# Patient Record
Sex: FEMALE | Race: WHITE | NOT HISPANIC OR LATINO
[De-identification: names, ages, dates, MRNs, and addresses within clinical notes are randomized per-mention and may not be internally consistent; named-entity substitution may affect disease eponyms.]

---

## 2018-03-01 ENCOUNTER — APPOINTMENT (OUTPATIENT)
Dept: PLASTIC SURGERY | Facility: CLINIC | Age: 48
End: 2018-03-01

## 2018-08-03 ENCOUNTER — APPOINTMENT (OUTPATIENT)
Dept: BREAST CENTER | Facility: CLINIC | Age: 48
End: 2018-08-03
Payer: COMMERCIAL

## 2018-08-03 VITALS
HEART RATE: 76 BPM | HEIGHT: 66 IN | SYSTOLIC BLOOD PRESSURE: 112 MMHG | BODY MASS INDEX: 31.34 KG/M2 | WEIGHT: 195 LBS | DIASTOLIC BLOOD PRESSURE: 76 MMHG

## 2018-08-03 DIAGNOSIS — N63.0 UNSPECIFIED LUMP IN UNSPECIFIED BREAST: ICD-10-CM

## 2018-08-03 DIAGNOSIS — Z82.3 FAMILY HISTORY OF STROKE: ICD-10-CM

## 2018-08-03 DIAGNOSIS — Z13.79 ENCOUNTER FOR OTHER SCREENING FOR GENETIC AND CHROMOSOMAL ANOMALIES: ICD-10-CM

## 2018-08-03 DIAGNOSIS — Z78.9 OTHER SPECIFIED HEALTH STATUS: ICD-10-CM

## 2018-08-03 DIAGNOSIS — Z80.3 FAMILY HISTORY OF MALIGNANT NEOPLASM OF BREAST: ICD-10-CM

## 2018-08-03 PROCEDURE — 99243 OFF/OP CNSLTJ NEW/EST LOW 30: CPT

## 2018-08-03 PROCEDURE — 36415 COLL VENOUS BLD VENIPUNCTURE: CPT

## 2018-09-05 ENCOUNTER — CLINICAL ADVICE (OUTPATIENT)
Age: 48
End: 2018-09-05

## 2019-05-16 ENCOUNTER — APPOINTMENT (OUTPATIENT)
Dept: OTOLARYNGOLOGY | Facility: CLINIC | Age: 49
End: 2019-05-16
Payer: SELF-PAY

## 2019-05-16 DIAGNOSIS — L98.8 OTHER SPECIFIED DISORDERS OF THE SKIN AND SUBCUTANEOUS TISSUE: ICD-10-CM

## 2019-05-16 PROCEDURE — D0165 COSMETIC CONSULT: CPT

## 2019-05-17 PROBLEM — L98.8 FACIAL RHYTIDS: Status: ACTIVE | Noted: 2019-05-17

## 2019-10-21 ENCOUNTER — APPOINTMENT (OUTPATIENT)
Dept: CARDIOLOGY | Facility: CLINIC | Age: 49
End: 2019-10-21
Payer: COMMERCIAL

## 2019-10-21 ENCOUNTER — NON-APPOINTMENT (OUTPATIENT)
Age: 49
End: 2019-10-21

## 2019-10-21 VITALS — HEIGHT: 66 IN | BODY MASS INDEX: 30.22 KG/M2 | WEIGHT: 188 LBS

## 2019-10-21 VITALS — SYSTOLIC BLOOD PRESSURE: 114 MMHG | OXYGEN SATURATION: 98 % | HEART RATE: 63 BPM | DIASTOLIC BLOOD PRESSURE: 82 MMHG

## 2019-10-21 PROCEDURE — 99205 OFFICE O/P NEW HI 60 MIN: CPT

## 2019-10-21 PROCEDURE — 93000 ELECTROCARDIOGRAM COMPLETE: CPT

## 2019-10-21 NOTE — HISTORY OF PRESENT ILLNESS
[FreeTextEntry1] : Patient is a 50 yo woman with no significant PMHx, family h/o CAD/MI presented to follow up  in setting of an abnormal ECG. the patient was seen by her PMD about a year ago and was noted to have T-wave inversions in the anterior precordium. Patient did not have any subsequent cardiac workup. Patient has not been physically active and is planning to start an exercise program. Patient denies any active cardiac symptoms-denies chest pain, shortness of breath, dyspnea on exertion, lightheadedness or syncope.

## 2019-10-21 NOTE — PHYSICAL EXAM
[General Appearance - Well Developed] : well developed [Normal Appearance] : normal appearance [Well Groomed] : well groomed [General Appearance - Well Nourished] : well nourished [No Deformities] : no deformities [General Appearance - In No Acute Distress] : no acute distress [Normal Conjunctiva] : the conjunctiva exhibited no abnormalities [Eyelids - No Xanthelasma] : the eyelids demonstrated no xanthelasmas [Normal Oral Mucosa] : normal oral mucosa [No Oral Pallor] : no oral pallor [No Oral Cyanosis] : no oral cyanosis [Normal Jugular Venous A Waves Present] : normal jugular venous A waves present [Normal Jugular Venous V Waves Present] : normal jugular venous V waves present [No Jugular Venous Harris A Waves] : no jugular venous harris A waves [Heart Rate And Rhythm] : heart rate and rhythm were normal [Heart Sounds] : normal S1 and S2 [Murmurs] : no murmurs present [Respiration, Rhythm And Depth] : normal respiratory rhythm and effort [Exaggerated Use Of Accessory Muscles For Inspiration] : no accessory muscle use [Auscultation Breath Sounds / Voice Sounds] : lungs were clear to auscultation bilaterally [Abdomen Soft] : soft [Abdomen Tenderness] : non-tender [Abdomen Mass (___ Cm)] : no abdominal mass palpated [Abnormal Walk] : normal gait [Gait - Sufficient For Exercise Testing] : the gait was sufficient for exercise testing [Nail Clubbing] : no clubbing of the fingernails [Cyanosis, Localized] : no localized cyanosis [Petechial Hemorrhages (___cm)] : no petechial hemorrhages [Skin Color & Pigmentation] : normal skin color and pigmentation [] : no rash [No Venous Stasis] : no venous stasis [Skin Lesions] : no skin lesions [No Skin Ulcers] : no skin ulcer [No Xanthoma] : no  xanthoma was observed [Oriented To Time, Place, And Person] : oriented to person, place, and time [Affect] : the affect was normal [Mood] : the mood was normal [No Anxiety] : not feeling anxious

## 2019-10-21 NOTE — DISCUSSION/SUMMARY
[FreeTextEntry1] : Patient is a 50 yo woman with no significant PMHx, family h/o CAD/MI presented to follow up  in setting of an abnormal ECG. the patient was seen by her PMD about a year ago and was noted to have T-wave inversions in the anterior precordium. patient is presently asymptomatic with no signs or symptoms and competent heart failure, unstable angina or arrhythmias\par - repeat EKG today confirms the T wave abnormality that was seen about a year ago\par - patient will be sent for an exercise stress test \par - an echocardiogram was done about a month \par - Exercise and weight loss and encouraged\par - BP is within normal limits. No medical therapy necessary at this time\par - Patient will be given a rreferral for routine blood work

## 2019-11-01 ENCOUNTER — APPOINTMENT (OUTPATIENT)
Dept: CV DIAGNOSTICS | Facility: HOSPITAL | Age: 49
End: 2019-11-01

## 2019-11-01 ENCOUNTER — OUTPATIENT (OUTPATIENT)
Dept: OUTPATIENT SERVICES | Facility: HOSPITAL | Age: 49
LOS: 1 days | End: 2019-11-01
Payer: COMMERCIAL

## 2019-11-01 DIAGNOSIS — R94.31 ABNORMAL ELECTROCARDIOGRAM [ECG] [EKG]: ICD-10-CM

## 2019-11-01 PROCEDURE — 93017 CV STRESS TEST TRACING ONLY: CPT

## 2019-11-01 PROCEDURE — 93018 CV STRESS TEST I&R ONLY: CPT

## 2019-11-01 PROCEDURE — 93016 CV STRESS TEST SUPVJ ONLY: CPT

## 2019-12-02 ENCOUNTER — APPOINTMENT (OUTPATIENT)
Dept: CV DIAGNOSITCS | Facility: HOSPITAL | Age: 49
End: 2019-12-02

## 2020-02-24 ENCOUNTER — APPOINTMENT (OUTPATIENT)
Dept: CARDIOLOGY | Facility: CLINIC | Age: 50
End: 2020-02-24

## 2020-10-16 ENCOUNTER — RESULT REVIEW (OUTPATIENT)
Age: 50
End: 2020-10-16

## 2021-03-09 ENCOUNTER — NON-APPOINTMENT (OUTPATIENT)
Age: 51
End: 2021-03-09

## 2021-03-10 ENCOUNTER — APPOINTMENT (OUTPATIENT)
Dept: CARDIOLOGY | Facility: CLINIC | Age: 51
End: 2021-03-10

## 2021-03-10 LAB — TROPONIN I SERPL-MCNC: <0.01 NG/ML

## 2021-03-12 ENCOUNTER — TRANSCRIPTION ENCOUNTER (OUTPATIENT)
Age: 51
End: 2021-03-12

## 2021-03-12 ENCOUNTER — EMERGENCY (EMERGENCY)
Facility: HOSPITAL | Age: 51
LOS: 1 days | Discharge: HOME | End: 2021-03-12
Attending: EMERGENCY MEDICINE
Payer: COMMERCIAL

## 2021-03-12 VITALS
SYSTOLIC BLOOD PRESSURE: 146 MMHG | TEMPERATURE: 100 F | OXYGEN SATURATION: 99 % | DIASTOLIC BLOOD PRESSURE: 81 MMHG | RESPIRATION RATE: 18 BRPM | HEART RATE: 88 BPM

## 2021-03-12 VITALS
SYSTOLIC BLOOD PRESSURE: 126 MMHG | RESPIRATION RATE: 17 BRPM | TEMPERATURE: 98 F | HEIGHT: 66 IN | WEIGHT: 195.11 LBS | OXYGEN SATURATION: 98 % | HEART RATE: 98 BPM | DIASTOLIC BLOOD PRESSURE: 82 MMHG

## 2021-03-12 LAB
ALBUMIN SERPL ELPH-MCNC: 4 G/DL — SIGNIFICANT CHANGE UP (ref 3.3–5)
ALP SERPL-CCNC: 41 U/L — SIGNIFICANT CHANGE UP (ref 40–120)
ALT FLD-CCNC: 24 U/L — SIGNIFICANT CHANGE UP (ref 10–45)
ANION GAP SERPL CALC-SCNC: 10 MMOL/L — SIGNIFICANT CHANGE UP (ref 5–17)
APTT BLD: 30 SEC — SIGNIFICANT CHANGE UP (ref 27.5–35.5)
AST SERPL-CCNC: 29 U/L — SIGNIFICANT CHANGE UP (ref 10–40)
BASOPHILS # BLD AUTO: 0.01 K/UL — SIGNIFICANT CHANGE UP (ref 0–0.2)
BASOPHILS NFR BLD AUTO: 0.3 % — SIGNIFICANT CHANGE UP (ref 0–2)
BILIRUB SERPL-MCNC: 0.3 MG/DL — SIGNIFICANT CHANGE UP (ref 0.2–1.2)
BUN SERPL-MCNC: 9 MG/DL — SIGNIFICANT CHANGE UP (ref 7–23)
CALCIUM SERPL-MCNC: 8.9 MG/DL — SIGNIFICANT CHANGE UP (ref 8.4–10.5)
CHLORIDE SERPL-SCNC: 100 MMOL/L — SIGNIFICANT CHANGE UP (ref 96–108)
CO2 SERPL-SCNC: 25 MMOL/L — SIGNIFICANT CHANGE UP (ref 22–31)
CREAT SERPL-MCNC: 0.77 MG/DL — SIGNIFICANT CHANGE UP (ref 0.5–1.3)
CRP SERPL-MCNC: 0.67 MG/DL — HIGH (ref 0–0.4)
D DIMER BLD IA.RAPID-MCNC: 327 NG/ML DDU — HIGH
EOSINOPHIL # BLD AUTO: 0 K/UL — SIGNIFICANT CHANGE UP (ref 0–0.5)
EOSINOPHIL NFR BLD AUTO: 0 % — SIGNIFICANT CHANGE UP (ref 0–6)
GAS PNL BLDV: SIGNIFICANT CHANGE UP
GLUCOSE SERPL-MCNC: 107 MG/DL — HIGH (ref 70–99)
HCG SERPL-ACNC: <2 MIU/ML — SIGNIFICANT CHANGE UP
HCT VFR BLD CALC: 38 % — SIGNIFICANT CHANGE UP (ref 34.5–45)
HGB BLD-MCNC: 12.9 G/DL — SIGNIFICANT CHANGE UP (ref 11.5–15.5)
IMM GRANULOCYTES NFR BLD AUTO: 0.3 % — SIGNIFICANT CHANGE UP (ref 0–1.5)
INR BLD: 1.16 RATIO — SIGNIFICANT CHANGE UP (ref 0.88–1.16)
LYMPHOCYTES # BLD AUTO: 0.57 K/UL — LOW (ref 1–3.3)
LYMPHOCYTES # BLD AUTO: 15.5 % — SIGNIFICANT CHANGE UP (ref 13–44)
MCHC RBC-ENTMCNC: 29.1 PG — SIGNIFICANT CHANGE UP (ref 27–34)
MCHC RBC-ENTMCNC: 33.9 GM/DL — SIGNIFICANT CHANGE UP (ref 32–36)
MCV RBC AUTO: 85.6 FL — SIGNIFICANT CHANGE UP (ref 80–100)
MONOCYTES # BLD AUTO: 0.31 K/UL — SIGNIFICANT CHANGE UP (ref 0–0.9)
MONOCYTES NFR BLD AUTO: 8.4 % — SIGNIFICANT CHANGE UP (ref 2–14)
NEUTROPHILS # BLD AUTO: 2.78 K/UL — SIGNIFICANT CHANGE UP (ref 1.8–7.4)
NEUTROPHILS NFR BLD AUTO: 75.5 % — SIGNIFICANT CHANGE UP (ref 43–77)
NRBC # BLD: 0 /100 WBCS — SIGNIFICANT CHANGE UP (ref 0–0)
PLATELET # BLD AUTO: 155 K/UL — SIGNIFICANT CHANGE UP (ref 150–400)
POTASSIUM SERPL-MCNC: 3.2 MMOL/L — LOW (ref 3.5–5.3)
POTASSIUM SERPL-SCNC: 3.2 MMOL/L — LOW (ref 3.5–5.3)
PROCALCITONIN SERPL-MCNC: 0.03 NG/ML — SIGNIFICANT CHANGE UP (ref 0.02–0.1)
PROT SERPL-MCNC: 6.9 G/DL — SIGNIFICANT CHANGE UP (ref 6–8.3)
PROTHROM AB SERPL-ACNC: 13.8 SEC — HIGH (ref 10.6–13.6)
RBC # BLD: 4.44 M/UL — SIGNIFICANT CHANGE UP (ref 3.8–5.2)
RBC # FLD: 12.3 % — SIGNIFICANT CHANGE UP (ref 10.3–14.5)
SARS-COV-2 RNA SPEC QL NAA+PROBE: DETECTED
SODIUM SERPL-SCNC: 135 MMOL/L — SIGNIFICANT CHANGE UP (ref 135–145)
WBC # BLD: 3.68 K/UL — LOW (ref 3.8–10.5)
WBC # FLD AUTO: 3.68 K/UL — LOW (ref 3.8–10.5)

## 2021-03-12 PROCEDURE — 85014 HEMATOCRIT: CPT

## 2021-03-12 PROCEDURE — 84145 PROCALCITONIN (PCT): CPT

## 2021-03-12 PROCEDURE — 71275 CT ANGIOGRAPHY CHEST: CPT | Mod: 26,ME

## 2021-03-12 PROCEDURE — 82435 ASSAY OF BLOOD CHLORIDE: CPT

## 2021-03-12 PROCEDURE — 85610 PROTHROMBIN TIME: CPT

## 2021-03-12 PROCEDURE — 85379 FIBRIN DEGRADATION QUANT: CPT

## 2021-03-12 PROCEDURE — 83605 ASSAY OF LACTIC ACID: CPT

## 2021-03-12 PROCEDURE — 93005 ELECTROCARDIOGRAM TRACING: CPT

## 2021-03-12 PROCEDURE — 84295 ASSAY OF SERUM SODIUM: CPT

## 2021-03-12 PROCEDURE — 71045 X-RAY EXAM CHEST 1 VIEW: CPT | Mod: 26

## 2021-03-12 PROCEDURE — 82330 ASSAY OF CALCIUM: CPT

## 2021-03-12 PROCEDURE — 82947 ASSAY GLUCOSE BLOOD QUANT: CPT

## 2021-03-12 PROCEDURE — 71045 X-RAY EXAM CHEST 1 VIEW: CPT

## 2021-03-12 PROCEDURE — 84132 ASSAY OF SERUM POTASSIUM: CPT

## 2021-03-12 PROCEDURE — 71275 CT ANGIOGRAPHY CHEST: CPT

## 2021-03-12 PROCEDURE — 80053 COMPREHEN METABOLIC PANEL: CPT

## 2021-03-12 PROCEDURE — 86140 C-REACTIVE PROTEIN: CPT

## 2021-03-12 PROCEDURE — U0003: CPT

## 2021-03-12 PROCEDURE — 84702 CHORIONIC GONADOTROPIN TEST: CPT

## 2021-03-12 PROCEDURE — U0005: CPT

## 2021-03-12 PROCEDURE — 82803 BLOOD GASES ANY COMBINATION: CPT

## 2021-03-12 PROCEDURE — 82728 ASSAY OF FERRITIN: CPT

## 2021-03-12 PROCEDURE — 36000 PLACE NEEDLE IN VEIN: CPT | Mod: XU

## 2021-03-12 PROCEDURE — 85018 HEMOGLOBIN: CPT

## 2021-03-12 PROCEDURE — 99285 EMERGENCY DEPT VISIT HI MDM: CPT

## 2021-03-12 PROCEDURE — 85025 COMPLETE CBC W/AUTO DIFF WBC: CPT

## 2021-03-12 PROCEDURE — 99285 EMERGENCY DEPT VISIT HI MDM: CPT | Mod: 25

## 2021-03-12 PROCEDURE — 85730 THROMBOPLASTIN TIME PARTIAL: CPT

## 2021-03-12 PROCEDURE — G1004: CPT

## 2021-03-12 PROCEDURE — 93010 ELECTROCARDIOGRAM REPORT: CPT

## 2021-03-12 RX ORDER — POTASSIUM CHLORIDE 20 MEQ
40 PACKET (EA) ORAL ONCE
Refills: 0 | Status: COMPLETED | OUTPATIENT
Start: 2021-03-12 | End: 2021-03-12

## 2021-03-12 RX ADMIN — Medication 40 MILLIEQUIVALENT(S): at 17:52

## 2021-03-12 NOTE — ED PROVIDER NOTE - OBJECTIVE STATEMENT
50yF Covid+ today outpatient presents with sob and elevated ddimer. Patient states that she has been having non productive cough, aches, of 6 day duration. Got covid tested 3 days prior and results came back positive today. Currently taking Zpack for possible PNA on outpatient CXR. No fever, chest pain, abd pain, n/v, urinary or bowel irregularities.

## 2021-03-12 NOTE — ED ADULT TRIAGE NOTE - WEIGHT IN LBS
195.1 Normocytic anemia. Folate deficiency. B12 wnl.  - Started folic acid daily   - Iron panel shows likely AOCD

## 2021-03-12 NOTE — ED ADULT TRIAGE NOTE - CHIEF COMPLAINT QUOTE
COVID + on 3/10, worsening cough, fatigue   chest xray showed possible pna, and had elevated d-dimer

## 2021-03-12 NOTE — ED PROVIDER NOTE - NSFOLLOWUPINSTRUCTIONS_ED_ALL_ED_FT
No signs of emergency medical condition on today's workup.  Presumptive diagnosis made, but further evaluation may be required by your primary care doctor or specialist for a definitive diagnosis.    Please follow up with your primary care physician in 24-48 hours  A copy of your results have been provided to you  You have been enrolled in the monoclonal antibogy program   A referral to Pulmonology have been provided to you  Please continue taking the Zpack as discussed   Please come back if any of the following: Fever, chest pain, shortness of breath, nausea, vomiting or any major concern

## 2021-03-12 NOTE — ED PROVIDER NOTE - PHYSICAL EXAMINATION
Gen: AAOx3, non-toxic  Head: NCAT  HEENT: EOMI, oral mucosa moist, normal conjunctiva  Lung: CTAB, no respiratory distress, speaking in full sentences  CV: RRR, no murmurs, rubs or gallops  Abd: soft, NTND, no guarding  MSK: no visible deformities  Neuro: No focal sensory or motor deficits  Skin: Warm, well perfused, no rash  Psych: normal affect.   ~Sigifredo Kapoor M.D. Resident

## 2021-03-12 NOTE — ED PROVIDER NOTE - CLINICAL SUMMARY MEDICAL DECISION MAKING FREE TEXT BOX
50yF Covid+ today outpatient presents with sob and elevated ddimer and PNA. Patient well appearing and non hypoxic. Will get ekg, labs, coags, blood gas, covid, CXR and reassess

## 2021-03-12 NOTE — ED PROVIDER NOTE - NS ED ROS FT
GENERAL: No fever or chills, EYES: no change in vision, HEENT: no trouble swallowing or speaking, CARDIAC: no chest pain, PULMONARY: +cough, + SOB, GI: no abdominal pain, no nausea, no vomiting, no diarrhea or constipation, : No changes in urination, SKIN: no rashes, NEURO: no headache,  MSK: No joint pain ~Sigifredo Kapoor M.D. Resident

## 2021-03-12 NOTE — ED PROVIDER NOTE - PROGRESS NOTE DETAILS
Emelia SMART: Patient have been enrolled in the monoclonal abx and instructed to continue taking the abx

## 2021-03-12 NOTE — ED PROVIDER NOTE - PATIENT PORTAL LINK FT
You can access the FollowMyHealth Patient Portal offered by [facility] by registering at the following website: [email or web address]. By joining [employer]’s FollowMyHealth portal, you will also be able to view your health information using other applications (apps) compatible with our system.

## 2021-03-12 NOTE — ED PROVIDER NOTE - ATTENDING CONTRIBUTION TO CARE
Patient COVID positive per outpatient testing presenting complaining of back.  Seen as outpatient and had positive d-dimer so presenting to Emergency Department for evaluation.  Here unremarkable exam.  Labs repeated with positive d-dimer, clinically I suspect this is an acute phase reactant secondary to COVID however patient highly concerned for possible PE so will obtain CT chest and if negative discharge.

## 2021-03-12 NOTE — ED ADULT NURSE NOTE - OBJECTIVE STATEMENT
49 y/o female presents to ED reporting abnormal labs. Pt reports being diagnosed with COVID-19 today and having blood work done at urgent care. Pt reports elevated d-dimer at urgent care and diagnosis of pneumonia. Pt sent to ED for elevated d-dimer. On exam, AOx3, speaking in complete sentences. Unlabored, spontaneous respirations, NAD, O2 sat 99% RA. Abdomen soft, non-tender, non-distended. Pt denies CP, SOB, n/v/d, fever/chills at this time. Heplock placed, labs sent. Seen and evaluated by MD. SMART at bedside to discuss plan of care at this time.

## 2021-03-12 NOTE — ED PROVIDER NOTE - NSFOLLOWUPCLINICS_GEN_ALL_ED_FT
[facility]  Pulmonology  [address] [place] [address]  Phone: [phone]  Fax:   Follow Up Time:

## 2021-03-13 LAB — FERRITIN SERPL-MCNC: 313 NG/ML — HIGH (ref 15–150)

## 2021-03-14 ENCOUNTER — APPOINTMENT (OUTPATIENT)
Dept: DISASTER EMERGENCY | Facility: HOSPITAL | Age: 51
End: 2021-03-14

## 2021-03-14 ENCOUNTER — OUTPATIENT (OUTPATIENT)
Dept: OUTPATIENT SERVICES | Facility: HOSPITAL | Age: 51
LOS: 1 days | End: 2021-03-14
Payer: COMMERCIAL

## 2021-03-14 VITALS
HEIGHT: 65 IN | OXYGEN SATURATION: 94 % | RESPIRATION RATE: 18 BRPM | SYSTOLIC BLOOD PRESSURE: 117 MMHG | DIASTOLIC BLOOD PRESSURE: 75 MMHG | TEMPERATURE: 99 F | HEART RATE: 96 BPM | WEIGHT: 195.11 LBS

## 2021-03-14 VITALS
OXYGEN SATURATION: 96 % | SYSTOLIC BLOOD PRESSURE: 132 MMHG | DIASTOLIC BLOOD PRESSURE: 81 MMHG | TEMPERATURE: 100 F | RESPIRATION RATE: 18 BRPM | HEART RATE: 79 BPM

## 2021-03-14 DIAGNOSIS — U07.1 COVID-19: ICD-10-CM

## 2021-03-14 PROCEDURE — M0239: CPT

## 2021-03-14 RX ORDER — BAMLANIVIMAB 35 MG/ML
700 INJECTION, SOLUTION INTRAVENOUS ONCE
Refills: 0 | Status: COMPLETED | OUTPATIENT
Start: 2021-03-14 | End: 2021-03-14

## 2021-03-14 RX ORDER — SODIUM CHLORIDE 9 MG/ML
250 INJECTION INTRAMUSCULAR; INTRAVENOUS; SUBCUTANEOUS
Refills: 0 | Status: DISCONTINUED | OUTPATIENT
Start: 2021-03-14 | End: 2021-03-28

## 2021-03-14 RX ADMIN — SODIUM CHLORIDE 25 MILLILITER(S): 9 INJECTION INTRAMUSCULAR; INTRAVENOUS; SUBCUTANEOUS at 12:19

## 2021-03-14 RX ADMIN — BAMLANIVIMAB 270 MILLIGRAM(S): 35 INJECTION, SOLUTION INTRAVENOUS at 12:10

## 2021-03-14 NOTE — CHART NOTE - NSCHARTNOTEFT_GEN_A_CORE
CC: Monoclonal Antibody Infusion/COVID 19 Positive  51yo Female with PMHx obesity, DMT2, HTN, COvid 19 positive on 3/12 with symptoms of cough and malaise referred by Dr Kapoor for monoclonal antibody infusion center.    exam/findings:  T(C): 37.3 (03-14-21 @ 12:40), Max: 37.3 (03-14-21 @ 11:48)  HR: 79 (03-14-21 @ 12:40) (79 - 96)  BP: 115/71 (03-14-21 @ 12:40) (115/71 - 117/75)  RR: 18 (03-14-21 @ 12:40) (18 - 18)  SpO2: 95% (03-14-21 @ 12:40) (94% - 95%)    PE:   Appearance: NAD	  HEENT:   Normal oral mucosa,   Lymphatic: No lymphadenopathy  Cardiovascular: Normal S1 S2, No JVD, No murmurs, No edema  Respiratory: Lungs clear to auscultation	  Gastrointestinal:  Soft, Non-tender, + BS	  Skin: warm and dry  Neurologic: Non-focal  Extremities: Normal range of motion, no calf tenderness or edema    ASSESSMENT:  Pt is a 50 years old, obese, diabetic, Covid 19 Positive, referred by Dr. Kapoor who presents to infusion center for Monoclonal antibody infusion Bamlanivimab.  Symptoms/ Criteria: cough, malaise  Risk Profile includes: DM, obesity    PLAN:  - infusion procedure explained to patient   -Consent for monoclonal antibody infusion obtained   - Risk & benefits discussed/all questions answered  -infuse   Bamlanivimab 700mg IV over one hour   -observe patient for one hour post infusion    I have reviewed the Bamlanivimab Emergency Use Authorization (EAU) and I have provided the patient or patient's caregiver with the following information:  1. FDA has authorized emergency use of Bamlanivimab, which is not FDA-approved biologic product.  2. The patient or patient's caregiver has the option to accept or refuse administration of Bamlanivimab  3. The significant known and benefits are unknown.  4. Information on available alternative treatments and risks and benefits of those alternatives.    Discharge:  Patient tolerated infusion well denies complaints of chest pain/SOB/dizziness/ palps  Vital signs stable for discharge home at 2:20pm   D/C instructions given/ fact sheet included.  Patient to follow-up with PCP as needed.

## 2021-03-15 ENCOUNTER — TRANSCRIPTION ENCOUNTER (OUTPATIENT)
Age: 51
End: 2021-03-15

## 2021-03-15 LAB
CK BB SERPL ELPH-CCNC: 0 % (ref 0–?)
CK MB CFR SERPL ELPH: 0 %
CK MM SERPL ELPH-CCNC: 100 %
CREATINE KINASE,TOTAL,SERUM: 199 U/L
MACRO TYPE 1: 0 %
MACRO TYPE 2: 0 %

## 2021-06-09 ENCOUNTER — TRANSCRIPTION ENCOUNTER (OUTPATIENT)
Age: 51
End: 2021-06-09

## 2021-07-07 ENCOUNTER — APPOINTMENT (OUTPATIENT)
Dept: CT IMAGING | Facility: CLINIC | Age: 51
End: 2021-07-07
Payer: COMMERCIAL

## 2021-07-07 ENCOUNTER — OUTPATIENT (OUTPATIENT)
Dept: OUTPATIENT SERVICES | Facility: HOSPITAL | Age: 51
LOS: 1 days | End: 2021-07-07
Payer: COMMERCIAL

## 2021-07-07 DIAGNOSIS — Z00.8 ENCOUNTER FOR OTHER GENERAL EXAMINATION: ICD-10-CM

## 2021-07-07 PROCEDURE — 71250 CT THORAX DX C-: CPT

## 2021-07-07 PROCEDURE — 71250 CT THORAX DX C-: CPT | Mod: 26

## 2023-06-21 ENCOUNTER — APPOINTMENT (OUTPATIENT)
Dept: SURGERY | Facility: CLINIC | Age: 53
End: 2023-06-21
Payer: COMMERCIAL

## 2023-06-21 PROCEDURE — 99205K: CUSTOM

## 2023-07-05 ENCOUNTER — OUTPATIENT (OUTPATIENT)
Dept: OUTPATIENT SERVICES | Facility: HOSPITAL | Age: 53
LOS: 1 days | End: 2023-07-05

## 2023-07-05 ENCOUNTER — APPOINTMENT (OUTPATIENT)
Dept: NUCLEAR MEDICINE | Facility: CLINIC | Age: 53
End: 2023-07-05
Payer: COMMERCIAL

## 2023-07-05 DIAGNOSIS — Z00.00 ENCOUNTER FOR GENERAL ADULT MEDICAL EXAMINATION WITHOUT ABNORMAL FINDINGS: ICD-10-CM

## 2023-07-05 PROCEDURE — 78815 PET IMAGE W/CT SKULL-THIGH: CPT | Mod: 26,PI

## 2023-07-08 ENCOUNTER — TRANSCRIPTION ENCOUNTER (OUTPATIENT)
Age: 53
End: 2023-07-08

## 2023-07-10 ENCOUNTER — OUTPATIENT (OUTPATIENT)
Dept: OUTPATIENT SERVICES | Facility: HOSPITAL | Age: 53
LOS: 1 days | Discharge: HOME | End: 2023-07-10
Payer: COMMERCIAL

## 2023-07-10 DIAGNOSIS — C50.919 MALIGNANT NEOPLASM OF UNSPECIFIED SITE OF UNSPECIFIED FEMALE BREAST: ICD-10-CM

## 2023-07-11 ENCOUNTER — RESULT REVIEW (OUTPATIENT)
Age: 53
End: 2023-07-11

## 2023-07-11 ENCOUNTER — APPOINTMENT (OUTPATIENT)
Dept: HEMATOLOGY ONCOLOGY | Facility: CLINIC | Age: 53
End: 2023-07-11
Payer: COMMERCIAL

## 2023-07-11 ENCOUNTER — NON-APPOINTMENT (OUTPATIENT)
Age: 53
End: 2023-07-11

## 2023-07-11 VITALS
HEART RATE: 72 BPM | WEIGHT: 186.95 LBS | DIASTOLIC BLOOD PRESSURE: 79 MMHG | HEIGHT: 64.96 IN | RESPIRATION RATE: 16 BRPM | TEMPERATURE: 97.7 F | OXYGEN SATURATION: 94 % | SYSTOLIC BLOOD PRESSURE: 122 MMHG | BODY MASS INDEX: 31.15 KG/M2

## 2023-07-11 LAB
BASOPHILS # BLD AUTO: 0.07 K/UL — SIGNIFICANT CHANGE UP (ref 0–0.2)
BASOPHILS NFR BLD AUTO: 0.8 % — SIGNIFICANT CHANGE UP (ref 0–2)
EOSINOPHIL # BLD AUTO: 0.52 K/UL — HIGH (ref 0–0.5)
EOSINOPHIL NFR BLD AUTO: 6.2 % — HIGH (ref 0–6)
HCT VFR BLD CALC: 38 % — SIGNIFICANT CHANGE UP (ref 34.5–45)
HGB BLD-MCNC: 12.8 G/DL — SIGNIFICANT CHANGE UP (ref 11.5–15.5)
IMM GRANULOCYTES NFR BLD AUTO: 0.4 % — SIGNIFICANT CHANGE UP (ref 0–0.9)
LYMPHOCYTES # BLD AUTO: 1.58 K/UL — SIGNIFICANT CHANGE UP (ref 1–3.3)
LYMPHOCYTES # BLD AUTO: 18.9 % — SIGNIFICANT CHANGE UP (ref 13–44)
MCHC RBC-ENTMCNC: 29.3 PG — SIGNIFICANT CHANGE UP (ref 27–34)
MCHC RBC-ENTMCNC: 33.7 G/DL — SIGNIFICANT CHANGE UP (ref 32–36)
MCV RBC AUTO: 87 FL — SIGNIFICANT CHANGE UP (ref 80–100)
MONOCYTES # BLD AUTO: 0.52 K/UL — SIGNIFICANT CHANGE UP (ref 0–0.9)
MONOCYTES NFR BLD AUTO: 6.2 % — SIGNIFICANT CHANGE UP (ref 2–14)
NEUTROPHILS # BLD AUTO: 5.66 K/UL — SIGNIFICANT CHANGE UP (ref 1.8–7.4)
NEUTROPHILS NFR BLD AUTO: 67.5 % — SIGNIFICANT CHANGE UP (ref 43–77)
NRBC # BLD: 0 /100 WBCS — SIGNIFICANT CHANGE UP (ref 0–0)
PLATELET # BLD AUTO: 256 K/UL — SIGNIFICANT CHANGE UP (ref 150–400)
RBC # BLD: 4.37 M/UL — SIGNIFICANT CHANGE UP (ref 3.8–5.2)
RBC # FLD: 12.1 % — SIGNIFICANT CHANGE UP (ref 10.3–14.5)
WBC # BLD: 8.38 K/UL — SIGNIFICANT CHANGE UP (ref 3.8–10.5)
WBC # FLD AUTO: 8.38 K/UL — SIGNIFICANT CHANGE UP (ref 3.8–10.5)

## 2023-07-11 PROCEDURE — 99205 OFFICE O/P NEW HI 60 MIN: CPT

## 2023-07-13 ENCOUNTER — APPOINTMENT (OUTPATIENT)
Dept: PLASTIC SURGERY | Facility: CLINIC | Age: 53
End: 2023-07-13
Payer: COMMERCIAL

## 2023-07-13 VITALS
HEIGHT: 64 IN | HEART RATE: 82 BPM | WEIGHT: 186.3 LBS | DIASTOLIC BLOOD PRESSURE: 73 MMHG | SYSTOLIC BLOOD PRESSURE: 110 MMHG | OXYGEN SATURATION: 97 % | BODY MASS INDEX: 31.8 KG/M2 | RESPIRATION RATE: 16 BRPM

## 2023-07-13 DIAGNOSIS — Z42.1 ENCOUNTER FOR BREAST RECONSTRUCTION FOLLOWING MASTECTOMY: ICD-10-CM

## 2023-07-13 PROCEDURE — 99205 OFFICE O/P NEW HI 60 MIN: CPT

## 2023-07-14 LAB
ALBUMIN SERPL ELPH-MCNC: 4.4 G/DL
ALP BLD-CCNC: 42 U/L
ALT SERPL-CCNC: 10 U/L
ANION GAP SERPL CALC-SCNC: 11 MMOL/L
AST SERPL-CCNC: 11 U/L
BILIRUB SERPL-MCNC: 0.2 MG/DL
BUN SERPL-MCNC: 15 MG/DL
CALCIUM SERPL-MCNC: 9.6 MG/DL
CHLORIDE SERPL-SCNC: 105 MMOL/L
CO2 SERPL-SCNC: 22 MMOL/L
CREAT SERPL-MCNC: 0.73 MG/DL
EGFR: 99 ML/MIN/1.73M2
GLUCOSE SERPL-MCNC: 101 MG/DL
HBV CORE IGG+IGM SER QL: NONREACTIVE
HBV SURFACE AB SER QL: ABNORMAL
HBV SURFACE AG SER QL: NONREACTIVE
HCG SERPL QL: NEGATIVE
HCV AB SER QL: NONREACTIVE
HCV S/CO RATIO: 0.17 S/CO
INR PPP: 1.08 RATIO
PAPP-A SERPL-ACNC: <1 MIU/ML
POTASSIUM SERPL-SCNC: 4 MMOL/L
PROT SERPL-MCNC: 6.7 G/DL
PT BLD: 12.8 SEC
SODIUM SERPL-SCNC: 138 MMOL/L

## 2023-07-17 ENCOUNTER — APPOINTMENT (OUTPATIENT)
Dept: CARDIOLOGY | Facility: CLINIC | Age: 53
End: 2023-07-17
Payer: COMMERCIAL

## 2023-07-17 PROCEDURE — 93356 MYOCRD STRAIN IMG SPCKL TRCK: CPT

## 2023-07-17 PROCEDURE — 93306 TTE W/DOPPLER COMPLETE: CPT

## 2023-07-20 ENCOUNTER — OUTPATIENT (OUTPATIENT)
Dept: OUTPATIENT SERVICES | Facility: HOSPITAL | Age: 53
LOS: 1 days | End: 2023-07-20
Payer: COMMERCIAL

## 2023-07-20 ENCOUNTER — APPOINTMENT (OUTPATIENT)
Dept: HEMATOLOGY ONCOLOGY | Facility: CLINIC | Age: 53
End: 2023-07-20
Payer: COMMERCIAL

## 2023-07-20 ENCOUNTER — TRANSCRIPTION ENCOUNTER (OUTPATIENT)
Age: 53
End: 2023-07-20

## 2023-07-20 ENCOUNTER — RESULT REVIEW (OUTPATIENT)
Age: 53
End: 2023-07-20

## 2023-07-20 VITALS
WEIGHT: 184.31 LBS | BODY MASS INDEX: 31.47 KG/M2 | RESPIRATION RATE: 16 BRPM | HEIGHT: 64.02 IN | HEART RATE: 70 BPM | DIASTOLIC BLOOD PRESSURE: 73 MMHG | TEMPERATURE: 97 F | OXYGEN SATURATION: 98 % | SYSTOLIC BLOOD PRESSURE: 109 MMHG

## 2023-07-20 VITALS
OXYGEN SATURATION: 98 % | HEIGHT: 65 IN | SYSTOLIC BLOOD PRESSURE: 124 MMHG | TEMPERATURE: 98 F | HEART RATE: 70 BPM | DIASTOLIC BLOOD PRESSURE: 73 MMHG | RESPIRATION RATE: 15 BRPM | WEIGHT: 184.97 LBS

## 2023-07-20 VITALS
RESPIRATION RATE: 16 BRPM | DIASTOLIC BLOOD PRESSURE: 62 MMHG | SYSTOLIC BLOOD PRESSURE: 109 MMHG | OXYGEN SATURATION: 100 % | HEART RATE: 62 BPM

## 2023-07-20 DIAGNOSIS — C50.911 MALIGNANT NEOPLASM OF UNSPECIFIED SITE OF RIGHT FEMALE BREAST: ICD-10-CM

## 2023-07-20 PROCEDURE — C1788: CPT

## 2023-07-20 PROCEDURE — 99215 OFFICE O/P EST HI 40 MIN: CPT

## 2023-07-20 PROCEDURE — C1894: CPT

## 2023-07-20 PROCEDURE — 36561 INSERT TUNNELED CV CATH: CPT

## 2023-07-20 PROCEDURE — 77001 FLUOROGUIDE FOR VEIN DEVICE: CPT

## 2023-07-20 PROCEDURE — 77001 FLUOROGUIDE FOR VEIN DEVICE: CPT | Mod: 26

## 2023-07-20 PROCEDURE — C1769: CPT

## 2023-07-20 NOTE — ASU DISCHARGE PLAN (ADULT/PEDIATRIC) - NURSING INSTRUCTIONS
Please feel free to contact us at [phone] if any problems arise. After 6PM, Monday through Friday, on weekends and on holidays, please call [phone] and ask for the radiology resident on call to be paged.

## 2023-07-20 NOTE — ASU PATIENT PROFILE, ADULT - FALL HARM RISK - FALLEN IN PAST
Pt c/o lower abdominal pain w/ nausea and diarrhea x2 days. Pt reports relief of pain after bowel movement. Denies vomiting, CP, or SOB.  Denies urinary symptoms.    
No

## 2023-07-20 NOTE — H&P ADULT - HISTORY OF PRESENT ILLNESS
Interventional Radiology    HPI: 53y Female with right sided breast cancer with plans for chemotherapy requiring mediport.     Review of Systems:   Constitutional: No fever, weight loss, or fatigue  Neurological: No headaches, memory loss, loss of strength, numbness, or tremors  Respiratory: No cough, wheezing, chills or hemoptysis; No shortness of breath  Cardiovascular: No chest pain, palpitations, dizziness, or leg swelling  Gastrointestinal: No abdominal or epigastric pain. No nausea, vomiting, or hematemesis; No diarrhea or constipation. No melena or hematochezia.  Skin: No itching, burning, rashes, or lesions   Musculoskeletal: No joint pain or swelling; No muscle, back, or extremity pain    Allergies: No Known Drug Allergies  adhesives (Rash)    Medications (Abx/Cardiac/Anticoagulation/Blood Products)      Data:  T(C): 36.7  HR: 70  BP: 124/73  RR: 15  SpO2: 98%            Physical Exam  General: No acute distress, nontoxic, A&Ox3  Chest: Non labored breathing  Abdomen: Non-distended, non-tender, no preitoneal signs  Skin: No rashes or lesions    RADIOLOGY & ADDITIONAL TESTS:    Imaging Reviewed    H & P Note Reviewed from: Chart    Plan: 53y Female presents for mediport placement.  -Risks/Benefits/alternatives explained with the patient and/or healthcare proxy and witnessed informed consent obtained.

## 2023-07-20 NOTE — ASU PATIENT PROFILE, ADULT - FALL HARM RISK - UNIVERSAL INTERVENTIONS
Bed in lowest position, wheels locked, appropriate side rails in place/Call bell, personal items and telephone in reach/Instruct patient to call for assistance before getting out of bed or chair/Non-slip footwear when patient is out of bed/[place] to call system/Physically safe environment - no spills, clutter or unnecessary equipment/Purposeful Proactive Rounding/Room/bathroom lighting operational, light cord in reach
none

## 2023-07-20 NOTE — PHYSICAL EXAM
[Normal] : affect appropriate [de-identified] : The right breast is without nipple retraction or skin dimpling; there is an approximate 6 x 5 centimeter density in the approximate 9-10 o'clock axis, which is freely mobile/not attached to the overlying skin; the breast is diffusely heterogeneously dense; there is no other palpable mass noted; in the right axilla is an approximate 5 x 1.5 centimeter palpable lymph node versus matted adenopathy, which is freely mobile (the patient reports having had a recent injection of ozone and mistletoe into the right breast approximately 5 days ago and notes that the breast mass has become more noticeable as a result since that time).  The left breast is without nipple retraction, skin dimpling, or palpable masses.  The left axilla is unremarkable.\par

## 2023-07-20 NOTE — HISTORY OF PRESENT ILLNESS
[Date: ____________] : Patient's last distress assessment performed on [unfilled]. [0 - No Distress] : Distress Level: 0 [Patient given social work contact information and resource sheet] : Patient was given social work contact information and resource sheet [de-identified] : The patient reports she "became more holistic over time.  I was not vaccinated for COVID, and then had a very bad COVID infection in April 2021."\par \par The patient then presented for a routine mammogram at [facility] in June 2022 in light of the patient's complaints of right breast pain at the 12 o'clock axis and 8 centimeters from the nipple.  An indeterminate 0.9 x 0.9 x 1.2 centimeter nodule was identified at the 10 o'clock axis of the right breast 8 centimeters from nipple; an ultrasound performed on that same date showed no cysts or sonographically suspicious masses identified in the area of patient's reported pain, but a 0.1/0.2 cm cyst identified at the 11 o'clock axis 5 centimeters from nipple; there was also an irregularly marginated slightly hypoechoic 0.9 x 0.9 x 1.2 centimeter nodule identified at 10 o'clock axis 8 centimeters from nipple.  Sonographic biopsy was recommended. She chose not to proceed with a biopsy.  She had 2 "thermography tests" done, one at a center in [place], and one at a center in [place], the patient verbally reports that these returned "negative."  She was then seen by a naturopath who prescribed various therapies, as well as essential oils per her description.  Approximately 1 month later, she presented for a repeat mammogram and sonogram at the [facility] in [place] -  August 2022, a sonogram performed at that center showed in the right breast multiple echo poor avascular foci with a 7 x 8 x 10 millimeters solid mass 7 centimeters/9 centimeters with micro calculi and slightly decreased M-mode through transmission; a left breast sonogram showed fibrocystic disease.  Biopsy and MRI were recommended per outside note.  The patient reports that she chose not to proceed with an MRI or further biopsy.  She followed with an integrative medicine practitioner taking mistletoe injections into her right breast as well as ozone therapy (Dr. Naidu).  She was ultimately referred for further opinion to Dr. Coronel, a surgeon in [place], who the patient reports noted "this was too small to palpate it."  The patient noted that it did not appear to be changed over a period of seven months.  She was recommended to have a bilateral breast MRI, but the patient chose not to do so.  She subsequently reported that as there was no change, she did not proceed with any further integrative therapies of note.  She then presented for a bilateral mammogram and sonogram at [facility] on 06/28/2023.  On mammogram in the right upper outer breast, approximately 7 centimeters from nipple, middle depth, there was an approximate 3 centimeter spiculated mass with associated heterogeneous calcifications, apparent architectural distortion extending in a linear fashion to a point approximately 0.5 centimeters from nipple, suspicious for ductal extension of disease; the right upper outer breast, posterior third, approximately 12 centimeters from nipple, there was approximately 1 centimeter grouping of heterogeneous calcifications suspicious in appearance; there were no mammographic suspicious findings in the left breast; ultrasound of the right breast demonstrated at the 10 o'clock axis, 8 centimeters from the nipple, a 2.5 x 2.7 x 2.0 centimeter irregular hypoechoic mass, highly suspicious for malignancy and corresponding to the largest focal enhancing mass on MRI (also performed there separately); there were numerous additional suspicious findings throughout the breast that were well detailed in this outside report extending between 8/9 o'clock and 12 o'clock axis, highly suspicious for multifocal/multicentric disease with ultrasound-guided core biopsies and these findings in the right breast were recommended; there was an enlarged right axillary lymph node suspicious for lymph node metastases with ultrasound guided core biopsy recommended.  The patient had a bilateral breast MRI performed on 06/26/2023 with a finding in the right breast of confluent heterogeneous enhancing masses measuring 4.0 x 1.7 x 2.0 centimeters present in the upper outer breast middle depth at 10-11 o'clock as well as multiple regional clumped and heterogeneous non mass enhancement throughout the breast extending to the nipple areolar complex consistent with multifocal/multicentric malignancy; the left breast did not show any evidence of suspicious finding; there was an abnormal right axillary lymph node measuring 2 centimeters demonstrating diffuse thickened cortex consistent with metastatic adenopathy.  \par \par The patient ultimately underwent ultrasound-guided core biopsy of the right breast, multiple sites, specifically with a finding in the right axilla of metastatic breast carcinoma, ER positive, [place] positive and HER-2/josé negative/no amplification; the right breast 10 o'clock axis had 2 core biopsies done which were highly suspicious for invasive carcinoma, and a third biopsy at the 10 o'clock axis returned with changes all positive for infiltrating ductal carcinoma, moderately differentiated; estrogen receptor positive, progesterone receptor positive, and HER-2/josé positive/amplified.  The patient subsequently saw Dr. Leslie Steward and these outside slides were reviewed and confirmed at [facility].  Additional slides for repeat HER2 testing have been requested but not yet received or processed of note.\par \par The patient had a PET-CT scan on 07/05/2023 with a finding of FDG avid right breast cancer with right axillary lymph node metastasis and no distant metastasis.  Of note; there was uptake in bilateral adnexa and endometrium, which was felt to be possibly physiologic in a premenopausal patient of note.  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, but no other pathogenic mutations of note.\par \par At time of initial consultation, I had an extensive discussion with the patient and her aunt regarding her newly diagnosed clinical T3 N1 M0, clinical stage IIA breast cancer, noting the implications of her clinical, radiologic, and pathologic presentation on the subsequent risk of developing local and metastatic recurrence.\par \par In light of the above, I have recommended neoadjuvant chemotherapy, specifically with that of dose dense AC chemotherapy every 2 weeks for 4 cycles followed by weekly paclitaxel weekly for 12 weeks and concomitant trastuzumab, pertuzumab every 3 weeks x 4 treatment.  Thereafter, I would recommend primary breast surgery.  Should she have no residual disease, I recommended ongoing trastuzumab and pertuzumab to complete a 52-week course; should she have residual disease, I would recommend crossing over to adjuvant Kadcyla every 3 weeks for 14 treatments.  Thereafter, I would recommend consideration of adjuvant radiation therapy, and subsequently anti estrogen therapy initially with Lupron to render her chemically menopausal (for a brief period of time as she will enter menopause with chemotherapy in light of her age), as well as an aromatase inhibitor such as anastrozole.  The potential risks, benefits, and anticipated side effects of this overall neoadjuvant/adjuvant treatment approach were extensively discussed.  The option of T-CHP was also preliminarily discussed; I have noted the results of a non-inferiority study of TCH vs ACTH,  noting that in the overall data, AC-THP has a slight advantage over T-CHP in terms of outcome.  Nonetheless, the other problem is that we do not have a secure supply of carboplatin as there is a national shortage at this time.  Consequently, I do recommend proceeding with AC- THP as noted above.\par \par I discussed that she will likely be rendered menopausal and asked if she wished further childbearing - she does not at this time. \par \par The patient will require pretreatment blood tests, echocardiogram, and insertion of a MediPort to proceed with current therapy.\par \par She agrees to proceed with the same.\par \par I have also discussed with the patient the discrepancy of her axillary lymph node biopsy, which returned HER-2/josé negative in contrast to her breast tissue which has returned HER2 positive.  I have noted she has extensive disease in the right breast, and it is possible she has heterogeneity in her tumors regarding her 2 over expression and consequently may be the discordance with her axillary biopsy results.  On the other hand, I do wish to have those slides obtained, as well as repeat testing of HER-2/josé and her axillary lymph node biopsy.  Nonetheless, I have noted that this will not change my treatment recommendations as noted above.\par  [de-identified] : The patient presents for follow up/to discuss treatment recommendation. \par \par She is s/p port placement today.  Tolerated procedure well.\par \par She denies any current complaints.  \par \par She notes a good appetite, stable weight and excellent performance status. \par \par LMP 7/16/23

## 2023-07-21 ENCOUNTER — APPOINTMENT (OUTPATIENT)
Dept: BREAST CENTER | Facility: CLINIC | Age: 53
End: 2023-07-21
Payer: COMMERCIAL

## 2023-07-21 VITALS
WEIGHT: 185 LBS | HEART RATE: 65 BPM | SYSTOLIC BLOOD PRESSURE: 102 MMHG | BODY MASS INDEX: 30.82 KG/M2 | HEIGHT: 65 IN | DIASTOLIC BLOOD PRESSURE: 70 MMHG

## 2023-07-21 DIAGNOSIS — Z87.19 PERSONAL HISTORY OF OTHER DISEASES OF THE DIGESTIVE SYSTEM: ICD-10-CM

## 2023-07-21 DIAGNOSIS — Z82.49 FAMILY HISTORY OF ISCHEMIC HEART DISEASE AND OTHER DISEASES OF THE CIRCULATORY SYSTEM: ICD-10-CM

## 2023-07-21 DIAGNOSIS — Z86.69 PERSONAL HISTORY OF OTHER DISEASES OF THE NERVOUS SYSTEM AND SENSE ORGANS: ICD-10-CM

## 2023-07-21 DIAGNOSIS — Z85.820 PERSONAL HISTORY OF MALIGNANT MELANOMA OF SKIN: ICD-10-CM

## 2023-07-21 PROCEDURE — 99205 OFFICE O/P NEW HI 60 MIN: CPT

## 2023-07-21 NOTE — CONSULT LETTER
[Dear  ___] : Dear  [unfilled], [Consult Letter:] : I had the pleasure of evaluating your patient, [unfilled]. [Sincerely,] : Sincerely, [DrPhoebe  ___] : Dr. GALLAGHER

## 2023-07-21 NOTE — PAST MEDICAL HISTORY
[Menarche Age ____] : age at menarche was [unfilled] [Menopause Age____] : age at menopause was [unfilled] [Total Preg ___] : G[unfilled] [Live Births ___] : P[unfilled]  [Age At Live Birth ___] : Age at live birth: [unfilled] [de-identified] : [id number] [FreeTextEntry7] : no [FreeTextEntry8] : 5months

## 2023-07-21 NOTE — HISTORY OF PRESENT ILLNESS
[FreeTextEntry1] : This is a 53 year old premenopausal female who was found to have an abnormality on a routine right breast ultrasound in 7/2022 for which a biopsy was advised.  She decided to use massage and essential oils and even had an injection of mistletoe into her breast.  She followed with ultrasounds by Dr. Phan in [place]  and thermograms and saw a breast surgeon, Dr. Coronel and was told it remained stable.  She had a followup mammogram and ultrasound in July and there were suspicious masses in the right breast with a suspicious axillary node.  Biopsies of two sites in the right breast showed invasive ductal cancer 10N8 ER+VA+Her 2 FISH amplified, KI 67 30% and 10N10 ER+VA+Her 2 1+ negative.  Biopsy of the right axillary node showed cancer also, ER+VA+Her 2 nonamplified, KI 67 20%.\par \par She met with Dr. Chan and chemotherapy is planned with ACx4 every 2 weeks with HP followed by weekly Tax x12.\par \par She met with Dr. Palomino to discuss reconstructive options as she is interested in bilateral mastectomies.

## 2023-07-21 NOTE — DATA REVIEWED
[FreeTextEntry1] : \par Bilateral mammogram 7/13/2022:  Mammogram negative\par Bilateral breast ultrasound 7/13/2022:  Right irregular slightly hypoechoic 9x9x12 mm nodule 10N8, rec biopsy\par \par Bilateral mammogram 6/28/2023:  Right upper outer breast N7 3 cm spiculated mass with heterogeneous calcifications.  Architectural distortion extending in linear fashion to 0.5 cm of nipple suspicious for ductal extension of disease.  Right upper posterior N12 1 cm grouping of heterogeneous calcifications, suspicious.\par \par Bilateral breast ultrasound 6/28/2023:  Right 10 N8 [id number] mm irregular hypoechoic mass.  Numerous additional findings in right breast: 10N9 58w12o7 mm\par                                                       periareolar 10  [id number] mm subtle shadowing and arch distortion\par                                                       10N12 10x8x8 mm shadowing with calcifications\par                                                       12N1 82m0k57 mm subtle hypoechoic nodularity\par                                                       12N4 7x4x12 mm focal hypoechoic nodularity\par                                                       11N5 6x5x7 mm focal hypoechoic nodularity\par                                                        8and 9 periareolar hypoechoic tubular densities extending for 3 cm\par                                                        Prominet lymph node [id number] mm with cortical thickening\par \par \par \par Bilateral breast MRI 6/23/2023:  Right- confluent heterogeneous enhancing masses [id number] mm upper outer mid depth 10-11:00.  Multi regional clumped and heterogeneous non mass enhancement throughout the breast extending to the NAC c/w multifocal/multicentric malignancy.  Left no suspicious masses. Abnormal right axillary node 2 cm c/w metastatic disease.\par \par Pathology 6/28/2023  Right breast axilla (Q clip)= metastatic carcinoma in lymph node, ER >90% AR 80%, Her 2 1-2+ FISH nonamplified, Ki 67 20%\par                                   Right breast 10:00N10 (Ribbon clip)= infiltrating ductal carcinoma, mod diff with DCIS, ER>90% AR>90% Her 2 1+, Ki 67 15%\par                                   Right breast 10:00 (RA)(Hourglass clip)= benign breast tissue with fibroadenomatoid change\par                                  Right breast 10N8 (coil clip)= infiltrating ductal carcinoma, mod diff, ER >90% AR 90% Her 2 2+ FISH amplified\par                  \par \par (Slides also reviewed at [facility])

## 2023-07-21 NOTE — PHYSICAL EXAM
[EOMI] : extra ocular movement intact [Sclera nonicteric] : sclera nonicteric [Supple] : supple [No Supraclavicular Adenopathy] : no supraclavicular adenopathy [No Cervical Adenopathy] : no cervical adenopathy [No Thyromegaly] : no thyromegaly [Clear to Auscultation Bilat] : clear to auscultation bilaterally [Normal Sinus Rhythm] : normal sinus rhythm [Normal S1, S2] : normal S1 and S2 [Bra Size: ___] : Bra Size: [unfilled] [No dominant masses] : no dominant masses left breast [No Nipple Retraction] : no left nipple retraction [No Nipple Discharge] : no left nipple discharge [No Axillary Lymphadenopathy] : no left axillary lymphadenopathy [Soft] : abdomen soft [Not Tender] : non-tender [No Palpable Masses] : no abdominal mass palpated [de-identified] : Left mediport- recent incision [de-identified] : 8 cm density 10:00. [de-identified] : 2-3 cm firm node high in axilla.

## 2023-07-25 ENCOUNTER — NON-APPOINTMENT (OUTPATIENT)
Age: 53
End: 2023-07-25

## 2023-07-25 DIAGNOSIS — Z45.2 ENCOUNTER FOR ADJUSTMENT AND MANAGEMENT OF VASCULAR ACCESS DEVICE: ICD-10-CM

## 2023-07-25 DIAGNOSIS — C50.911 MALIGNANT NEOPLASM OF UNSPECIFIED SITE OF RIGHT FEMALE BREAST: ICD-10-CM

## 2023-07-31 ENCOUNTER — APPOINTMENT (OUTPATIENT)
Dept: ULTRASOUND IMAGING | Facility: CLINIC | Age: 53
End: 2023-07-31

## 2023-08-01 ENCOUNTER — NON-APPOINTMENT (OUTPATIENT)
Age: 53
End: 2023-08-01

## 2023-08-02 ENCOUNTER — APPOINTMENT (OUTPATIENT)
Dept: INFUSION THERAPY | Facility: HOSPITAL | Age: 53
End: 2023-08-02

## 2023-08-02 ENCOUNTER — APPOINTMENT (OUTPATIENT)
Dept: HEMATOLOGY ONCOLOGY | Facility: CLINIC | Age: 53
End: 2023-08-02

## 2023-08-02 ENCOUNTER — RESULT REVIEW (OUTPATIENT)
Age: 53
End: 2023-08-02

## 2023-08-02 ENCOUNTER — NON-APPOINTMENT (OUTPATIENT)
Age: 53
End: 2023-08-02

## 2023-08-02 ENCOUNTER — APPOINTMENT (OUTPATIENT)
Dept: HEMATOLOGY ONCOLOGY | Facility: CLINIC | Age: 53
End: 2023-08-02
Payer: COMMERCIAL

## 2023-08-02 VITALS
DIASTOLIC BLOOD PRESSURE: 76 MMHG | OXYGEN SATURATION: 98 % | HEART RATE: 73 BPM | BODY MASS INDEX: 30.6 KG/M2 | HEIGHT: 65 IN | RESPIRATION RATE: 16 BRPM | WEIGHT: 183.65 LBS | TEMPERATURE: 97.7 F | SYSTOLIC BLOOD PRESSURE: 113 MMHG

## 2023-08-02 LAB
BASOPHILS # BLD AUTO: 0.09 K/UL — SIGNIFICANT CHANGE UP (ref 0–0.2)
BASOPHILS NFR BLD AUTO: 1.6 % — SIGNIFICANT CHANGE UP (ref 0–2)
EOSINOPHIL # BLD AUTO: 0.35 K/UL — SIGNIFICANT CHANGE UP (ref 0–0.5)
EOSINOPHIL NFR BLD AUTO: 6.2 % — HIGH (ref 0–6)
HCT VFR BLD CALC: 36.5 % — SIGNIFICANT CHANGE UP (ref 34.5–45)
HGB BLD-MCNC: 12.6 G/DL — SIGNIFICANT CHANGE UP (ref 11.5–15.5)
IMM GRANULOCYTES NFR BLD AUTO: 0.4 % — SIGNIFICANT CHANGE UP (ref 0–0.9)
LYMPHOCYTES # BLD AUTO: 1.12 K/UL — SIGNIFICANT CHANGE UP (ref 1–3.3)
LYMPHOCYTES # BLD AUTO: 19.8 % — SIGNIFICANT CHANGE UP (ref 13–44)
MCHC RBC-ENTMCNC: 29.3 PG — SIGNIFICANT CHANGE UP (ref 27–34)
MCHC RBC-ENTMCNC: 34.5 G/DL — SIGNIFICANT CHANGE UP (ref 32–36)
MCV RBC AUTO: 84.9 FL — SIGNIFICANT CHANGE UP (ref 80–100)
MONOCYTES # BLD AUTO: 0.39 K/UL — SIGNIFICANT CHANGE UP (ref 0–0.9)
MONOCYTES NFR BLD AUTO: 6.9 % — SIGNIFICANT CHANGE UP (ref 2–14)
NEUTROPHILS # BLD AUTO: 3.68 K/UL — SIGNIFICANT CHANGE UP (ref 1.8–7.4)
NEUTROPHILS NFR BLD AUTO: 65.1 % — SIGNIFICANT CHANGE UP (ref 43–77)
NRBC # BLD: 0 /100 WBCS — SIGNIFICANT CHANGE UP (ref 0–0)
PLATELET # BLD AUTO: 192 K/UL — SIGNIFICANT CHANGE UP (ref 150–400)
RBC # BLD: 4.3 M/UL — SIGNIFICANT CHANGE UP (ref 3.8–5.2)
RBC # FLD: 12.1 % — SIGNIFICANT CHANGE UP (ref 10.3–14.5)
WBC # BLD: 5.65 K/UL — SIGNIFICANT CHANGE UP (ref 3.8–10.5)
WBC # FLD AUTO: 5.65 K/UL — SIGNIFICANT CHANGE UP (ref 3.8–10.5)

## 2023-08-02 PROCEDURE — 93010 ELECTROCARDIOGRAM REPORT: CPT

## 2023-08-02 PROCEDURE — 99214 OFFICE O/P EST MOD 30 MIN: CPT

## 2023-08-02 NOTE — PHYSICAL EXAM
[Fully active, able to carry on all pre-disease performance without restriction] : Status 0 - Fully active, able to carry on all pre-disease performance without restriction [Normal] : affect appropriate [de-identified] : The right breast is without nipple retraction or skin dimpling; there is an approximate 6 x 5 centimeter density in the approximate 9-10 o'clock axis, which is freely mobile/not attached to the overlying skin; the breast is diffusely heterogeneously dense; there is no other palpable mass noted; in the right axilla is an approximate 5 x 2 centimeter palpable lymph node versus matted adenopathy, which is freely mobile   The left breast is without nipple retraction, skin dimpling, or palpable masses.  The left axilla is unremarkable.

## 2023-08-02 NOTE — REASON FOR VISIT
[Follow-Up Visit] : a follow-up [Pre-Treatment Visit] : a pre-treatment [Family Member] : family member [Other: _____] : [unfilled] [FreeTextEntry2] : breast cancer

## 2023-08-02 NOTE — HISTORY OF PRESENT ILLNESS
[de-identified] : The patient reports she "became more holistic over time.  I was not vaccinated for COVID, and then had a very bad COVID infection in April 2021."\par  \par  The patient then presented for a routine mammogram at [facility] in June 2022 in light of the patient's complaints of right breast pain at the 12 o'clock axis and 8 centimeters from the nipple.  An indeterminate 0.9 x 0.9 x 1.2 centimeter nodule was identified at the 10 o'clock axis of the right breast 8 centimeters from nipple; an ultrasound performed on that same date showed no cysts or sonographically suspicious masses identified in the area of patient's reported pain, but a 0.1/0.2 cm cyst identified at the 11 o'clock axis 5 centimeters from nipple; there was also an irregularly marginated slightly hypoechoic 0.9 x 0.9 x 1.2 centimeter nodule identified at 10 o'clock axis 8 centimeters from nipple.  Sonographic biopsy was recommended. She chose not to proceed with a biopsy.  She had 2 "thermography tests" done, one at a center in [place], and one at a center in [place], the patient verbally reports that these returned "negative."  She was then seen by a naturopath who prescribed various therapies, as well as essential oils per her description.  Approximately 1 month later, she presented for a repeat mammogram and sonogram at the [facility] in [place] -  August 2022, a sonogram performed at that center showed in the right breast multiple echo poor avascular foci with a 7 x 8 x 10 millimeters solid mass 7 centimeters/9 centimeters with micro calculi and slightly decreased M-mode through transmission; a left breast sonogram showed fibrocystic disease.  Biopsy and MRI were recommended per outside note.  The patient reports that she chose not to proceed with an MRI or further biopsy.  She followed with an integrative medicine practitioner taking mistletoe injections into her right breast as well as ozone therapy (Dr. Naidu).  She was ultimately referred for further opinion to Dr. Coronel, a surgeon in [place], who the patient reports noted "this was too small to palpate it."  The patient noted that it did not appear to be changed over a period of seven months.  She was recommended to have a bilateral breast MRI, but the patient chose not to do so.  She subsequently reported that as there was no change, she did not proceed with any further integrative therapies of note.  She then presented for a bilateral mammogram and sonogram at [facility] on 06/28/2023.  On mammogram in the right upper outer breast, approximately 7 centimeters from nipple, middle depth, there was an approximate 3 centimeter spiculated mass with associated heterogeneous calcifications, apparent architectural distortion extending in a linear fashion to a point approximately 0.5 centimeters from nipple, suspicious for ductal extension of disease; the right upper outer breast, posterior third, approximately 12 centimeters from nipple, there was approximately 1 centimeter grouping of heterogeneous calcifications suspicious in appearance; there were no mammographic suspicious findings in the left breast; ultrasound of the right breast demonstrated at the 10 o'clock axis, 8 centimeters from the nipple, a 2.5 x 2.7 x 2.0 centimeter irregular hypoechoic mass, highly suspicious for malignancy and corresponding to the largest focal enhancing mass on MRI (also performed there separately); there were numerous additional suspicious findings throughout the breast that were well detailed in this outside report extending between 8/9 o'clock and 12 o'clock axis, highly suspicious for multifocal/multicentric disease with ultrasound-guided core biopsies and these findings in the right breast were recommended; there was an enlarged right axillary lymph node suspicious for lymph node metastases with ultrasound guided core biopsy recommended.  The patient had a bilateral breast MRI performed on 06/26/2023 with a finding in the right breast of confluent heterogeneous enhancing masses measuring 4.0 x 1.7 x 2.0 centimeters present in the upper outer breast middle depth at 10-11 o'clock as well as multiple regional clumped and heterogeneous non mass enhancement throughout the breast extending to the nipple areolar complex consistent with multifocal/multicentric malignancy; the left breast did not show any evidence of suspicious finding; there was an abnormal right axillary lymph node measuring 2 centimeters demonstrating diffuse thickened cortex consistent with metastatic adenopathy.  \par  \par  The patient ultimately underwent ultrasound-guided core biopsy of the right breast, multiple sites, specifically with a finding in the right axilla of metastatic breast carcinoma, ER positive, TN positive and HER-2/josé negative/no amplification; the right breast 10 o'clock axis had 2 core biopsies done which were highly suspicious for invasive carcinoma, and a third biopsy at the 10 o'clock axis returned with changes all positive for infiltrating ductal carcinoma, moderately differentiated; estrogen receptor positive, progesterone receptor positive, and HER-2/josé positive/amplified.  The patient subsequently saw Dr. Leslie Steward and these outside slides were reviewed and confirmed at [facility].  Additional slides for repeat HER2 testing have been requested but not yet received or processed of note.\par  \par  The patient had a PET-CT scan on 07/05/2023 with a finding of FDG avid right breast cancer with right axillary lymph node metastasis and no distant metastasis.  Of note; there was uptake in bilateral adnexa and endometrium, which was felt to be possibly physiologic in a premenopausal patient of note.  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, but no other pathogenic mutations of note.\par  \par  At time of initial consultation, I had an extensive discussion with the patient and her aunt regarding her newly diagnosed clinical T3 N1 M0, clinical stage IIA breast cancer, noting the implications of her clinical, radiologic, and pathologic presentation on the subsequent risk of developing local and metastatic recurrence.\par  \par  In light of the above, I have recommended neoadjuvant chemotherapy, specifically with that of dose dense AC chemotherapy every 2 weeks for 4 cycles followed by weekly paclitaxel weekly for 12 weeks and concomitant trastuzumab, pertuzumab every 3 weeks x 4 treatment.  Thereafter, I would recommend primary breast surgery.  Should she have no residual disease, I recommended ongoing trastuzumab and pertuzumab to complete a 52-week course; should she have residual disease, I would recommend crossing over to adjuvant Kadcyla every 3 weeks for 14 treatments.  Thereafter, I would recommend consideration of adjuvant radiation therapy, and subsequently anti estrogen therapy initially with Lupron to render her chemically menopausal (for a brief period of time as she will enter menopause with chemotherapy in light of her age), as well as an aromatase inhibitor such as anastrozole.  The potential risks, benefits, and anticipated side effects of this overall neoadjuvant/adjuvant treatment approach were extensively discussed.  The option of T-CHP was also preliminarily discussed; I have noted the results of a non-inferiority study of TCH vs ACTH,  noting that in the overall data, AC-THP has a slight advantage over T-CHP in terms of outcome.  Nonetheless, the other problem is that we do not have a secure supply of carboplatin as there is a national shortage at this time.  Consequently, I do recommend proceeding with AC- THP as noted above.\par  \par  I discussed that she will likely be rendered menopausal and asked if she wished further childbearing - she does not at this time. \par  \par  The patient will require pretreatment blood tests, echocardiogram, and insertion of a MediPort to proceed with current therapy.\par  \par  She agrees to proceed with the same.\par  \par  I have also discussed with the patient the discrepancy of her axillary lymph node biopsy, which returned HER-2/josé negative in contrast to her breast tissue which has returned HER2 positive.  I have noted she has extensive disease in the right breast, and it is possible she has heterogeneity in her tumors regarding her 2 over expression and consequently may be the discordance with her axillary biopsy results.  On the other hand, I do wish to have those slides obtained, as well as repeat testing of HER-2/josé and her axillary lymph node biopsy.  Nonetheless, I have noted that this will not change my treatment recommendations as noted above.\par   [de-identified] : The patient presents for follow up and to start adjuvant AC/Onpro.   She denies any current complaints.    She notes a good appetite, stable weight and excellent performance status.   In interim outside unstained slides were received - prelim Her 2 josé (2+) low level - [facility] prelim "negative" but final report not in. This was discussed with pt.    LMP 7/16/23 [Date: ____________] : Patient's last distress assessment performed on [unfilled]. [0 - No Distress] : Distress Level: 0 [Patient given social work contact information and resource sheet] : Patient was given social work contact information and resource sheet

## 2023-08-03 ENCOUNTER — NON-APPOINTMENT (OUTPATIENT)
Age: 53
End: 2023-08-03

## 2023-08-03 DIAGNOSIS — R11.2 NAUSEA WITH VOMITING, UNSPECIFIED: ICD-10-CM

## 2023-08-03 DIAGNOSIS — Z51.11 ENCOUNTER FOR ANTINEOPLASTIC CHEMOTHERAPY: ICD-10-CM

## 2023-08-03 DIAGNOSIS — Z51.89 ENCOUNTER FOR OTHER SPECIFIED AFTERCARE: ICD-10-CM

## 2023-08-04 ENCOUNTER — APPOINTMENT (OUTPATIENT)
Dept: ULTRASOUND IMAGING | Facility: CLINIC | Age: 53
End: 2023-08-04

## 2023-08-16 ENCOUNTER — RESULT REVIEW (OUTPATIENT)
Age: 53
End: 2023-08-16

## 2023-08-16 ENCOUNTER — APPOINTMENT (OUTPATIENT)
Dept: HEMATOLOGY ONCOLOGY | Facility: CLINIC | Age: 53
End: 2023-08-16
Payer: COMMERCIAL

## 2023-08-16 ENCOUNTER — APPOINTMENT (OUTPATIENT)
Dept: HEMATOLOGY ONCOLOGY | Facility: CLINIC | Age: 53
End: 2023-08-16

## 2023-08-16 ENCOUNTER — APPOINTMENT (OUTPATIENT)
Dept: INFUSION THERAPY | Facility: HOSPITAL | Age: 53
End: 2023-08-16

## 2023-08-16 VITALS
OXYGEN SATURATION: 100 % | RESPIRATION RATE: 16 BRPM | HEART RATE: 73 BPM | SYSTOLIC BLOOD PRESSURE: 102 MMHG | WEIGHT: 180.78 LBS | BODY MASS INDEX: 30.12 KG/M2 | TEMPERATURE: 97.2 F | DIASTOLIC BLOOD PRESSURE: 69 MMHG | HEIGHT: 65 IN

## 2023-08-16 LAB
BASOPHILS # BLD AUTO: 0.19 K/UL — SIGNIFICANT CHANGE UP (ref 0–0.2)
BASOPHILS NFR BLD AUTO: 2 % — SIGNIFICANT CHANGE UP (ref 0–2)
EOSINOPHIL # BLD AUTO: 0.14 K/UL — SIGNIFICANT CHANGE UP (ref 0–0.5)
EOSINOPHIL NFR BLD AUTO: 1.5 % — SIGNIFICANT CHANGE UP (ref 0–6)
HCT VFR BLD CALC: 35.7 % — SIGNIFICANT CHANGE UP (ref 34.5–45)
HGB BLD-MCNC: 12.1 G/DL — SIGNIFICANT CHANGE UP (ref 11.5–15.5)
LYMPHOCYTES # BLD AUTO: 1.43 K/UL — SIGNIFICANT CHANGE UP (ref 1–3.3)
LYMPHOCYTES # BLD AUTO: 15 % — SIGNIFICANT CHANGE UP (ref 13–44)
MCHC RBC-ENTMCNC: 29.2 PG — SIGNIFICANT CHANGE UP (ref 27–34)
MCHC RBC-ENTMCNC: 33.9 G/DL — SIGNIFICANT CHANGE UP (ref 32–36)
MCV RBC AUTO: 86.2 FL — SIGNIFICANT CHANGE UP (ref 80–100)
METAMYELOCYTES # FLD: 2.5 % — HIGH (ref 0–0)
MONOCYTES # BLD AUTO: 0.52 K/UL — SIGNIFICANT CHANGE UP (ref 0–0.9)
MONOCYTES NFR BLD AUTO: 5.5 % — SIGNIFICANT CHANGE UP (ref 2–14)
MYELOCYTES NFR BLD: 4.5 % — HIGH (ref 0–0)
NEUTROPHILS # BLD AUTO: 6.57 K/UL — SIGNIFICANT CHANGE UP (ref 1.8–7.4)
NEUTROPHILS NFR BLD AUTO: 69 % — SIGNIFICANT CHANGE UP (ref 43–77)
NRBC # BLD: 0 /100 — SIGNIFICANT CHANGE UP (ref 0–0)
NRBC # BLD: SIGNIFICANT CHANGE UP /100 WBCS (ref 0–0)
PLAT MORPH BLD: NORMAL — SIGNIFICANT CHANGE UP
PLATELET # BLD AUTO: 164 K/UL — SIGNIFICANT CHANGE UP (ref 150–400)
RBC # BLD: 4.14 M/UL — SIGNIFICANT CHANGE UP (ref 3.8–5.2)
RBC # FLD: 12.6 % — SIGNIFICANT CHANGE UP (ref 10.3–14.5)
RBC BLD AUTO: SIGNIFICANT CHANGE UP
WBC # BLD: 9.52 K/UL — SIGNIFICANT CHANGE UP (ref 3.8–10.5)
WBC # FLD AUTO: 9.52 K/UL — SIGNIFICANT CHANGE UP (ref 3.8–10.5)

## 2023-08-16 PROCEDURE — 99214 OFFICE O/P EST MOD 30 MIN: CPT

## 2023-08-16 NOTE — PHYSICAL EXAM
[Fully active, able to carry on all pre-disease performance without restriction] : Status 0 - Fully active, able to carry on all pre-disease performance without restriction [Normal] : affect appropriate [de-identified] : The right breast is without nipple retraction or skin dimpling; there is an approximate 6 x 5 centimeter density at approximate 9-10 o'clock axis, which is freely mobile/not attached to the overlying skin; the breast is diffusely heterogeneously dense; there is no other palpable mass noted; in the right axilla is an approximate 5 x 2 centimeter palpable lymph node versus matted adenopathy, which is freely mobile   The left breast is without nipple retraction, skin dimpling, or discrete palpable masses, no palpable axillary nodes.

## 2023-08-16 NOTE — REASON FOR VISIT
[Follow-Up Visit] : a follow-up [Pre-Treatment Visit] : a pre-treatment [Other: _____] : [unfilled] [FreeTextEntry2] : breast cancer

## 2023-08-16 NOTE — HISTORY OF PRESENT ILLNESS
[de-identified] : The patient reports she "became more holistic over time.  I was not vaccinated for COVID, and then had a very bad COVID infection in April 2021."  The patient then presented for a routine mammogram at [facility] in June 2022 in light of the patient's complaints of right breast pain at the 12 o'clock axis and 8 centimeters from the nipple.  An indeterminate 0.9 x 0.9 x 1.2 centimeter nodule was identified at the 10 o'clock axis of the right breast 8 centimeters from nipple; an ultrasound performed on that same date showed no cysts or sonographically suspicious masses identified in the area of patient's reported pain, but a 0.1/0.2 cm cyst identified at the 11 o'clock axis 5 centimeters from nipple; there was also an irregularly marginated slightly hypoechoic 0.9 x 0.9 x 1.2 centimeter nodule identified at 10 o'clock axis 8 centimeters from nipple.  Sonographic biopsy was recommended. She chose not to proceed with a biopsy.  She had 2 "thermography tests" done, one at a center in [place], and one at a center in [place], the patient verbally reports that these returned "negative."  She was then seen by a naturopath who prescribed various therapies, as well as essential oils per her description.  Approximately 1 month later, she presented for a repeat mammogram and sonogram at the [facility] in [place] -  August 2022, a sonogram performed at that center showed in the right breast multiple echo poor avascular foci with a 7 x 8 x 10 millimeters solid mass 7 centimeters/9 centimeters with micro calculi and slightly decreased M-mode through transmission; a left breast sonogram showed fibrocystic disease.  Biopsy and MRI were recommended per outside note.  The patient reports that she chose not to proceed with an MRI or further biopsy.  She followed with an integrative medicine practitioner taking mistletoe injections into her right breast as well as ozone therapy (Dr. Naidu).  She was ultimately referred for further opinion to Dr. Coronel, a surgeon in [place], who the patient reports noted "this was too small to palpate it."  The patient noted that it did not appear to be changed over a period of seven months.  She was recommended to have a bilateral breast MRI, but the patient chose not to do so.  She subsequently reported that as there was no change, she did not proceed with any further integrative therapies of note.  She then presented for a bilateral mammogram and sonogram at [facility] on 06/28/2023.  On mammogram in the right upper outer breast, approximately 7 centimeters from nipple, middle depth, there was an approximate 3 centimeter spiculated mass with associated heterogeneous calcifications, apparent architectural distortion extending in a linear fashion to a point approximately 0.5 centimeters from nipple, suspicious for ductal extension of disease; the right upper outer breast, posterior third, approximately 12 centimeters from nipple, there was approximately 1 centimeter grouping of heterogeneous calcifications suspicious in appearance; there were no mammographic suspicious findings in the left breast; ultrasound of the right breast demonstrated at the 10 o'clock axis, 8 centimeters from the nipple, a 2.5 x 2.7 x 2.0 centimeter irregular hypoechoic mass, highly suspicious for malignancy and corresponding to the largest focal enhancing mass on MRI (also performed there separately); there were numerous additional suspicious findings throughout the breast that were well detailed in this outside report extending between 8/9 o'clock and 12 o'clock axis, highly suspicious for multifocal/multicentric disease with ultrasound-guided core biopsies and these findings in the right breast were recommended; there was an enlarged right axillary lymph node suspicious for lymph node metastases with ultrasound guided core biopsy recommended.  The patient had a bilateral breast MRI performed on 06/26/2023 with a finding in the right breast of confluent heterogeneous enhancing masses measuring 4.0 x 1.7 x 2.0 centimeters present in the upper outer breast middle depth at 10-11 o'clock as well as multiple regional clumped and heterogeneous non mass enhancement throughout the breast extending to the nipple areolar complex consistent with multifocal/multicentric malignancy; the left breast did not show any evidence of suspicious finding; there was an abnormal right axillary lymph node measuring 2 centimeters demonstrating diffuse thickened cortex consistent with metastatic adenopathy.    The patient ultimately underwent ultrasound-guided core biopsy of the right breast, multiple sites, specifically with a finding in the right axilla of metastatic breast carcinoma, ER positive, MI positive and HER-2/josé negative/no amplification; the right breast 10 o'clock axis had 2 core biopsies done which were highly suspicious for invasive carcinoma, and a third biopsy at the 10 o'clock axis returned with changes all positive for infiltrating ductal carcinoma, moderately differentiated; estrogen receptor positive, progesterone receptor positive, and HER-2/josé positive/amplified.  The patient subsequently saw Dr. Leslie Steward and these outside slides were reviewed and confirmed at [facility].  Additional slides for repeat HER2 testing have been requested but not yet received or processed of note.  The patient had a PET-CT scan on 07/05/2023 with a finding of FDG avid right breast cancer with right axillary lymph node metastasis and no distant metastasis.  Of note; there was uptake in bilateral adnexa and endometrium, which was felt to be possibly physiologic in a premenopausal patient of note.    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  At time of initial consultation, I had an extensive discussion with the patient and her aunt regarding her newly diagnosed clinical T3 N1 M0, clinical stage IIA breast cancer, noting the implications of her clinical, radiologic, and pathologic presentation on the subsequent risk of developing local and metastatic recurrence.  In light of the above, I have recommended neoadjuvant chemotherapy, specifically with that of dose dense AC chemotherapy every 2 weeks for 4 cycles followed by weekly paclitaxel weekly for 12 weeks and concomitant trastuzumab, pertuzumab every 3 weeks x 4 treatment.  Thereafter, I would recommend primary breast surgery.  Should she have no residual disease, I recommended ongoing trastuzumab and pertuzumab to complete a 52-week course; should she have residual disease, I would recommend crossing over to adjuvant Kadcyla every 3 weeks for 14 treatments.  Thereafter, I would recommend consideration of adjuvant radiation therapy, and subsequently anti estrogen therapy initially with Lupron to render her chemically menopausal (for a brief period of time as she will enter menopause with chemotherapy in light of her age), as well as an aromatase inhibitor such as anastrozole.  The potential risks, benefits, and anticipated side effects of this overall neoadjuvant/adjuvant treatment approach were extensively discussed.  The option of T-CHP was also preliminarily discussed; I have noted the results of a non-inferiority study of TCH vs ACTH,  noting that in the overall data, AC-THP has a slight advantage over T-CHP in terms of outcome.  Nonetheless, the other problem is that we do not have a secure supply of carboplatin as there is a national shortage at this time.  Consequently, I do recommend proceeding with AC- THP as noted above.  I discussed that she will likely be rendered menopausal and asked if she wished further childbearing - she does not at this time.   The patient will require pretreatment blood tests, echocardiogram, and insertion of a MediPort to proceed with current therapy.  She agrees to proceed with the same.  I have also discussed with the patient the discrepancy of her axillary lymph node biopsy, which returned HER-2/josé negative in contrast to her breast tissue which has returned HER2 positive.  I have noted she has extensive disease in the right breast, and it is possible she has heterogeneity in her tumors regarding her 2 over expression and consequently may be the discordance with her axillary biopsy results.  On the other hand, I do wish to have those slides obtained, as well as repeat testing of HER-2/josé and her axillary lymph node biopsy.  Nonetheless, I have noted that this will not change my treatment recommendations as noted above.  Repeat testing of HER-2/josé came back negative.    [de-identified] : The patient presents for follow up/pretreatment.    S/p C1 AC/Onpro.   The patient states she experienced nausea and fatigue for the 3 days after treatment and then resolved.  She states she didn't take the antiemetic for nausea she took 1/4 of a gummy as needed she got from her friend.   She denies any other complaints.   She notes a good appetite, stable weight and excellent performance status.    LMP 7/16/23 [Date: ____________] : Patient's last distress assessment performed on [unfilled]. [0 - No Distress] : Distress Level: 0 [Patient given social work contact information and resource sheet] : Patient was given social work contact information and resource sheet

## 2023-08-29 ENCOUNTER — NON-APPOINTMENT (OUTPATIENT)
Age: 53
End: 2023-08-29

## 2023-08-30 ENCOUNTER — RESULT REVIEW (OUTPATIENT)
Age: 53
End: 2023-08-30

## 2023-08-30 ENCOUNTER — APPOINTMENT (OUTPATIENT)
Dept: HEMATOLOGY ONCOLOGY | Facility: CLINIC | Age: 53
End: 2023-08-30
Payer: COMMERCIAL

## 2023-08-30 ENCOUNTER — APPOINTMENT (OUTPATIENT)
Dept: INFUSION THERAPY | Facility: HOSPITAL | Age: 53
End: 2023-08-30

## 2023-08-30 ENCOUNTER — APPOINTMENT (OUTPATIENT)
Dept: PALLIATIVE MEDICINE | Facility: CLINIC | Age: 53
End: 2023-08-30

## 2023-08-30 VITALS
RESPIRATION RATE: 16 BRPM | DIASTOLIC BLOOD PRESSURE: 64 MMHG | HEART RATE: 67 BPM | BODY MASS INDEX: 30.3 KG/M2 | WEIGHT: 182.1 LBS | SYSTOLIC BLOOD PRESSURE: 101 MMHG | TEMPERATURE: 96.9 F | OXYGEN SATURATION: 98 %

## 2023-08-30 LAB
BASOPHILS # BLD AUTO: 0.12 K/UL — SIGNIFICANT CHANGE UP (ref 0–0.2)
BASOPHILS NFR BLD AUTO: 1 % — SIGNIFICANT CHANGE UP (ref 0–2)
EOSINOPHIL # BLD AUTO: 0.24 K/UL — SIGNIFICANT CHANGE UP (ref 0–0.5)
EOSINOPHIL NFR BLD AUTO: 2 % — SIGNIFICANT CHANGE UP (ref 0–6)
HCT VFR BLD CALC: 34.2 % — LOW (ref 34.5–45)
HGB BLD-MCNC: 11.5 G/DL — SIGNIFICANT CHANGE UP (ref 11.5–15.5)
LYMPHOCYTES # BLD AUTO: 1.19 K/UL — SIGNIFICANT CHANGE UP (ref 1–3.3)
LYMPHOCYTES # BLD AUTO: 10 % — LOW (ref 13–44)
MCHC RBC-ENTMCNC: 29.2 PG — SIGNIFICANT CHANGE UP (ref 27–34)
MCHC RBC-ENTMCNC: 33.6 G/DL — SIGNIFICANT CHANGE UP (ref 32–36)
MCV RBC AUTO: 86.8 FL — SIGNIFICANT CHANGE UP (ref 80–100)
METAMYELOCYTES # FLD: 5 % — HIGH (ref 0–0)
MONOCYTES # BLD AUTO: 0.71 K/UL — SIGNIFICANT CHANGE UP (ref 0–0.9)
MONOCYTES NFR BLD AUTO: 6 % — SIGNIFICANT CHANGE UP (ref 2–14)
MYELOCYTES NFR BLD: 6 % — HIGH (ref 0–0)
NEUTROPHILS # BLD AUTO: 8.32 K/UL — HIGH (ref 1.8–7.4)
NEUTROPHILS NFR BLD AUTO: 70 % — SIGNIFICANT CHANGE UP (ref 43–77)
NRBC # BLD: 0 /100 — SIGNIFICANT CHANGE UP (ref 0–0)
NRBC # BLD: SIGNIFICANT CHANGE UP /100 WBCS (ref 0–0)
PLAT MORPH BLD: NORMAL — SIGNIFICANT CHANGE UP
PLATELET # BLD AUTO: 182 K/UL — SIGNIFICANT CHANGE UP (ref 150–400)
POLYCHROMASIA BLD QL SMEAR: SLIGHT — SIGNIFICANT CHANGE UP
RBC # BLD: 3.94 M/UL — SIGNIFICANT CHANGE UP (ref 3.8–5.2)
RBC # FLD: 14 % — SIGNIFICANT CHANGE UP (ref 10.3–14.5)
RBC BLD AUTO: SIGNIFICANT CHANGE UP
WBC # BLD: 11.89 K/UL — HIGH (ref 3.8–10.5)
WBC # FLD AUTO: 11.89 K/UL — HIGH (ref 3.8–10.5)

## 2023-08-30 PROCEDURE — 99214 OFFICE O/P EST MOD 30 MIN: CPT

## 2023-08-30 NOTE — END OF VISIT
[FreeTextEntry3] : Patient being seen as per physician's primary plan of care. Dr. Chan also saw patient.  [Time Spent: ___ minutes] : I have spent [unfilled] minutes of time on the encounter.

## 2023-08-30 NOTE — PHYSICAL EXAM
[Fully active, able to carry on all pre-disease performance without restriction] : Status 0 - Fully active, able to carry on all pre-disease performance without restriction [Normal] : affect appropriate [de-identified] : The right breast is without nipple retraction or skin dimpling; there is an approximate 5 centimeter density at approximate 9-10 o'clock axis, which is freely mobile/not attached to the overlying skin, appears softer and slightly smaller than last visit; there is no other palpable mass noted; in the right axilla is an approximate a 4 centimeter palpable lymph node, slightly smaller.   The left breast is without nipple retraction, skin dimpling, or discrete palpable masses, no palpable axillary nodes.

## 2023-08-30 NOTE — HISTORY OF PRESENT ILLNESS
[de-identified] : The patient reports she "became more holistic over time.  I was not vaccinated for COVID, and then had a very bad COVID infection in April 2021."  The patient then presented for a routine mammogram at [facility] in June 2022 in light of the patient's complaints of right breast pain at the 12 o'clock axis and 8 centimeters from the nipple.  An indeterminate 0.9 x 0.9 x 1.2 centimeter nodule was identified at the 10 o'clock axis of the right breast 8 centimeters from nipple; an ultrasound performed on that same date showed no cysts or sonographically suspicious masses identified in the area of patient's reported pain, but a 0.1/0.2 cm cyst identified at the 11 o'clock axis 5 centimeters from nipple; there was also an irregularly marginated slightly hypoechoic 0.9 x 0.9 x 1.2 centimeter nodule identified at 10 o'clock axis 8 centimeters from nipple.  Sonographic biopsy was recommended. She chose not to proceed with a biopsy.  She had 2 "thermography tests" done, one at a center in [place], and one at a center in [place], the patient verbally reports that these returned "negative."  She was then seen by a naturopath who prescribed various therapies, as well as essential oils per her description.  Approximately 1 month later, she presented for a repeat mammogram and sonogram at the [facility] in [place] -  August 2022, a sonogram performed at that center showed in the right breast multiple echo poor avascular foci with a 7 x 8 x 10 millimeters solid mass 7 centimeters/9 centimeters with micro calculi and slightly decreased M-mode through transmission; a left breast sonogram showed fibrocystic disease.  Biopsy and MRI were recommended per outside note.  The patient reports that she chose not to proceed with an MRI or further biopsy.  She followed with an integrative medicine practitioner taking mistletoe injections into her right breast as well as ozone therapy (Dr. Naidu).  She was ultimately referred for further opinion to Dr. Coronel, a surgeon in [place], who the patient reports noted "this was too small to palpate it."  The patient noted that it did not appear to be changed over a period of seven months.  She was recommended to have a bilateral breast MRI, but the patient chose not to do so.  She subsequently reported that as there was no change, she did not proceed with any further integrative therapies of note.  She then presented for a bilateral mammogram and sonogram at [facility] on 06/28/2023.  On mammogram in the right upper outer breast, approximately 7 centimeters from nipple, middle depth, there was an approximate 3 centimeter spiculated mass with associated heterogeneous calcifications, apparent architectural distortion extending in a linear fashion to a point approximately 0.5 centimeters from nipple, suspicious for ductal extension of disease; the right upper outer breast, posterior third, approximately 12 centimeters from nipple, there was approximately 1 centimeter grouping of heterogeneous calcifications suspicious in appearance; there were no mammographic suspicious findings in the left breast; ultrasound of the right breast demonstrated at the 10 o'clock axis, 8 centimeters from the nipple, a 2.5 x 2.7 x 2.0 centimeter irregular hypoechoic mass, highly suspicious for malignancy and corresponding to the largest focal enhancing mass on MRI (also performed there separately); there were numerous additional suspicious findings throughout the breast that were well detailed in this outside report extending between 8/9 o'clock and 12 o'clock axis, highly suspicious for multifocal/multicentric disease with ultrasound-guided core biopsies and these findings in the right breast were recommended; there was an enlarged right axillary lymph node suspicious for lymph node metastases with ultrasound guided core biopsy recommended.  The patient had a bilateral breast MRI performed on 06/26/2023 with a finding in the right breast of confluent heterogeneous enhancing masses measuring 4.0 x 1.7 x 2.0 centimeters present in the upper outer breast middle depth at 10-11 o'clock as well as multiple regional clumped and heterogeneous non mass enhancement throughout the breast extending to the nipple areolar complex consistent with multifocal/multicentric malignancy; the left breast did not show any evidence of suspicious finding; there was an abnormal right axillary lymph node measuring 2 centimeters demonstrating diffuse thickened cortex consistent with metastatic adenopathy.    The patient ultimately underwent ultrasound-guided core biopsy of the right breast, multiple sites, specifically with a finding in the right axilla of metastatic breast carcinoma, ER positive, KS positive and HER-2/josé negative/no amplification; the right breast 10 o'clock axis had 2 core biopsies done which were highly suspicious for invasive carcinoma, and a third biopsy at the 10 o'clock axis returned with changes all positive for infiltrating ductal carcinoma, moderately differentiated; estrogen receptor positive, progesterone receptor positive, and HER-2/josé positive/amplified.  The patient subsequently saw Dr. Leslie Steward and these outside slides were reviewed and confirmed at [facility].  Additional slides for repeat HER2 testing have been requested but not yet received or processed of note.  The patient had a PET-CT scan on 07/05/2023 with a finding of FDG avid right breast cancer with right axillary lymph node metastasis and no distant metastasis.  Of note; there was uptake in bilateral adnexa and endometrium, which was felt to be possibly physiologic in a premenopausal patient of note.    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  At time of initial consultation, I had an extensive discussion with the patient and her aunt regarding her newly diagnosed clinical T3 N1 M0, clinical stage IIA breast cancer, noting the implications of her clinical, radiologic, and pathologic presentation on the subsequent risk of developing local and metastatic recurrence.  In light of the above, I have recommended neoadjuvant chemotherapy, specifically with that of dose dense AC chemotherapy every 2 weeks for 4 cycles followed by weekly paclitaxel weekly for 12 weeks and concomitant trastuzumab, pertuzumab every 3 weeks x 4 treatment.  Thereafter, I would recommend primary breast surgery.  Should she have no residual disease, I recommended ongoing trastuzumab and pertuzumab to complete a 52-week course; should she have residual disease, I would recommend crossing over to adjuvant Kadcyla every 3 weeks for 14 treatments.  Thereafter, I would recommend consideration of adjuvant radiation therapy, and subsequently anti estrogen therapy initially with Lupron to render her chemically menopausal (for a brief period of time as she will enter menopause with chemotherapy in light of her age), as well as an aromatase inhibitor such as anastrozole.  The potential risks, benefits, and anticipated side effects of this overall neoadjuvant/adjuvant treatment approach were extensively discussed.  The option of T-CHP was also preliminarily discussed; I have noted the results of a non-inferiority study of TCH vs ACTH,  noting that in the overall data, AC-THP has a slight advantage over T-CHP in terms of outcome.  Nonetheless, the other problem is that we do not have a secure supply of carboplatin as there is a national shortage at this time.  Consequently, I do recommend proceeding with AC- THP as noted above.  I discussed that she will likely be rendered menopausal and asked if she wished further childbearing - she does not at this time.   The patient will require pretreatment blood tests, echocardiogram, and insertion of a MediPort to proceed with current therapy.  She agrees to proceed with the same.  I have also discussed with the patient the discrepancy of her axillary lymph node biopsy, which returned HER-2/josé negative in contrast to her breast tissue which has returned HER2 positive.  I have noted she has extensive disease in the right breast, and it is possible she has heterogeneity in her tumors regarding her 2 over expression and consequently may be the discordance with her axillary biopsy results.  On the other hand, I do wish to have those slides obtained, as well as repeat testing of HER-2/josé and her axillary lymph node biopsy.  Nonetheless, I have noted that this will not change my treatment recommendations as noted above.  Repeat testing of HER-2/josé came back negative.    [de-identified] : The patient presents for follow up/pretreatment.    S/p C2 AC/Onpro.   The patient states she experienced nausea and fatigue for the 3 days after treatment and then resolved.  She continues to take a 1/4 of a gummy as needed she got from her friend.  She is scheduled for consultation with palliative care today.   She denies any other complaints.   She notes a good appetite, stable weight and excellent performance status.    LMP 7/16/23 [Date: ____________] : Patient's last distress assessment performed on [unfilled]. [0 - No Distress] : Distress Level: 0 [Patient given social work contact information and resource sheet] : Patient was given social work contact information and resource sheet

## 2023-09-01 ENCOUNTER — OUTPATIENT (OUTPATIENT)
Dept: OUTPATIENT SERVICES | Facility: HOSPITAL | Age: 53
LOS: 1 days | Discharge: HOME | End: 2023-09-01

## 2023-09-01 DIAGNOSIS — C50.919 MALIGNANT NEOPLASM OF UNSPECIFIED SITE OF UNSPECIFIED FEMALE BREAST: ICD-10-CM

## 2023-09-13 ENCOUNTER — RESULT REVIEW (OUTPATIENT)
Age: 53
End: 2023-09-13

## 2023-09-13 ENCOUNTER — APPOINTMENT (OUTPATIENT)
Dept: HEMATOLOGY ONCOLOGY | Facility: CLINIC | Age: 53
End: 2023-09-13
Payer: COMMERCIAL

## 2023-09-13 ENCOUNTER — APPOINTMENT (OUTPATIENT)
Dept: INFUSION THERAPY | Facility: HOSPITAL | Age: 53
End: 2023-09-13

## 2023-09-13 VITALS
OXYGEN SATURATION: 98 % | WEIGHT: 181.22 LBS | BODY MASS INDEX: 30.19 KG/M2 | HEART RATE: 74 BPM | DIASTOLIC BLOOD PRESSURE: 72 MMHG | SYSTOLIC BLOOD PRESSURE: 109 MMHG | HEIGHT: 65 IN | RESPIRATION RATE: 16 BRPM | TEMPERATURE: 98 F

## 2023-09-13 DIAGNOSIS — K12.1 OTHER FORMS OF STOMATITIS: ICD-10-CM

## 2023-09-13 LAB
ALBUMIN SERPL ELPH-MCNC: 4.4 G/DL — SIGNIFICANT CHANGE UP (ref 3.3–5)
ALP SERPL-CCNC: 113 U/L — SIGNIFICANT CHANGE UP (ref 40–120)
ALT FLD-CCNC: 18 U/L — SIGNIFICANT CHANGE UP (ref 10–45)
ANION GAP SERPL CALC-SCNC: 12 MMOL/L — SIGNIFICANT CHANGE UP (ref 5–17)
AST SERPL-CCNC: 20 U/L — SIGNIFICANT CHANGE UP (ref 10–40)
BASOPHILS # BLD AUTO: 0.34 K/UL — HIGH (ref 0–0.2)
BASOPHILS NFR BLD AUTO: 3 % — HIGH (ref 0–2)
BILIRUB SERPL-MCNC: 0.3 MG/DL — SIGNIFICANT CHANGE UP (ref 0.2–1.2)
BUN SERPL-MCNC: 10 MG/DL — SIGNIFICANT CHANGE UP (ref 7–23)
CALCIUM SERPL-MCNC: 9.6 MG/DL — SIGNIFICANT CHANGE UP (ref 8.4–10.5)
CHLORIDE SERPL-SCNC: 101 MMOL/L — SIGNIFICANT CHANGE UP (ref 96–108)
CO2 SERPL-SCNC: 27 MMOL/L — SIGNIFICANT CHANGE UP (ref 22–31)
CREAT SERPL-MCNC: 0.57 MG/DL — SIGNIFICANT CHANGE UP (ref 0.5–1.3)
EGFR: 109 ML/MIN/1.73M2 — SIGNIFICANT CHANGE UP
EOSINOPHIL # BLD AUTO: 0.11 K/UL — SIGNIFICANT CHANGE UP (ref 0–0.5)
EOSINOPHIL NFR BLD AUTO: 1 % — SIGNIFICANT CHANGE UP (ref 0–6)
GLUCOSE SERPL-MCNC: 99 MG/DL — SIGNIFICANT CHANGE UP (ref 70–99)
HCT VFR BLD CALC: 34.6 % — SIGNIFICANT CHANGE UP (ref 34.5–45)
HGB BLD-MCNC: 11.8 G/DL — SIGNIFICANT CHANGE UP (ref 11.5–15.5)
LYMPHOCYTES # BLD AUTO: 1.23 K/UL — SIGNIFICANT CHANGE UP (ref 1–3.3)
LYMPHOCYTES # BLD AUTO: 11 % — LOW (ref 13–44)
MCHC RBC-ENTMCNC: 30.1 PG — SIGNIFICANT CHANGE UP (ref 27–34)
MCHC RBC-ENTMCNC: 34.1 G/DL — SIGNIFICANT CHANGE UP (ref 32–36)
MCV RBC AUTO: 88.3 FL — SIGNIFICANT CHANGE UP (ref 80–100)
METAMYELOCYTES # FLD: 4 % — HIGH (ref 0–0)
MONOCYTES # BLD AUTO: 1.12 K/UL — HIGH (ref 0–0.9)
MONOCYTES NFR BLD AUTO: 10 % — SIGNIFICANT CHANGE UP (ref 2–14)
MYELOCYTES NFR BLD: 5 % — HIGH (ref 0–0)
NEUTROPHILS # BLD AUTO: 7.38 K/UL — SIGNIFICANT CHANGE UP (ref 1.8–7.4)
NEUTROPHILS NFR BLD AUTO: 66 % — SIGNIFICANT CHANGE UP (ref 43–77)
NRBC # BLD: 1 /100 — HIGH (ref 0–0)
NRBC # BLD: SIGNIFICANT CHANGE UP /100 WBCS (ref 0–0)
PLAT MORPH BLD: NORMAL — SIGNIFICANT CHANGE UP
PLATELET # BLD AUTO: 217 K/UL — SIGNIFICANT CHANGE UP (ref 150–400)
POLYCHROMASIA BLD QL SMEAR: SLIGHT — SIGNIFICANT CHANGE UP
POTASSIUM SERPL-MCNC: 3.9 MMOL/L — SIGNIFICANT CHANGE UP (ref 3.5–5.3)
POTASSIUM SERPL-SCNC: 3.9 MMOL/L — SIGNIFICANT CHANGE UP (ref 3.5–5.3)
PROT SERPL-MCNC: 6.6 G/DL — SIGNIFICANT CHANGE UP (ref 6–8.3)
RBC # BLD: 3.92 M/UL — SIGNIFICANT CHANGE UP (ref 3.8–5.2)
RBC # FLD: 15.6 % — HIGH (ref 10.3–14.5)
RBC BLD AUTO: SIGNIFICANT CHANGE UP
SODIUM SERPL-SCNC: 139 MMOL/L — SIGNIFICANT CHANGE UP (ref 135–145)
WBC # BLD: 11.18 K/UL — HIGH (ref 3.8–10.5)
WBC # FLD AUTO: 11.18 K/UL — HIGH (ref 3.8–10.5)

## 2023-09-13 PROCEDURE — 99214 OFFICE O/P EST MOD 30 MIN: CPT

## 2023-09-14 DIAGNOSIS — R11.2 NAUSEA WITH VOMITING, UNSPECIFIED: ICD-10-CM

## 2023-09-14 DIAGNOSIS — Z51.11 ENCOUNTER FOR ANTINEOPLASTIC CHEMOTHERAPY: ICD-10-CM

## 2023-09-14 DIAGNOSIS — Z51.89 ENCOUNTER FOR OTHER SPECIFIED AFTERCARE: ICD-10-CM

## 2023-09-19 ENCOUNTER — RESULT REVIEW (OUTPATIENT)
Age: 53
End: 2023-09-19

## 2023-09-20 ENCOUNTER — APPOINTMENT (OUTPATIENT)
Dept: ULTRASOUND IMAGING | Facility: IMAGING CENTER | Age: 53
End: 2023-09-20
Payer: COMMERCIAL

## 2023-09-20 ENCOUNTER — RESULT REVIEW (OUTPATIENT)
Age: 53
End: 2023-09-20

## 2023-09-20 ENCOUNTER — OUTPATIENT (OUTPATIENT)
Dept: OUTPATIENT SERVICES | Facility: HOSPITAL | Age: 53
LOS: 1 days | End: 2023-09-20
Payer: COMMERCIAL

## 2023-09-20 ENCOUNTER — APPOINTMENT (OUTPATIENT)
Dept: PALLIATIVE MEDICINE | Facility: CLINIC | Age: 53
End: 2023-09-20
Payer: COMMERCIAL

## 2023-09-20 DIAGNOSIS — Z51.5 ENCOUNTER FOR PALLIATIVE CARE: ICD-10-CM

## 2023-09-20 DIAGNOSIS — C50.811 MALIGNANT NEOPLASM OF OVERLAPPING SITES OF RIGHT FEMALE BREAST: ICD-10-CM

## 2023-09-20 DIAGNOSIS — G47.00 INSOMNIA, UNSPECIFIED: ICD-10-CM

## 2023-09-20 PROCEDURE — 76642 ULTRASOUND BREAST LIMITED: CPT

## 2023-09-20 PROCEDURE — 99204 OFFICE O/P NEW MOD 45 MIN: CPT | Mod: 95

## 2023-09-20 PROCEDURE — 76642 ULTRASOUND BREAST LIMITED: CPT | Mod: 26,RT

## 2023-09-22 ENCOUNTER — APPOINTMENT (OUTPATIENT)
Dept: BREAST CENTER | Facility: CLINIC | Age: 53
End: 2023-09-22

## 2023-09-26 ENCOUNTER — NON-APPOINTMENT (OUTPATIENT)
Age: 53
End: 2023-09-26

## 2023-09-27 ENCOUNTER — RESULT REVIEW (OUTPATIENT)
Age: 53
End: 2023-09-27

## 2023-09-27 ENCOUNTER — APPOINTMENT (OUTPATIENT)
Dept: HEMATOLOGY ONCOLOGY | Facility: CLINIC | Age: 53
End: 2023-09-27

## 2023-09-27 ENCOUNTER — APPOINTMENT (OUTPATIENT)
Dept: INFUSION THERAPY | Facility: HOSPITAL | Age: 53
End: 2023-09-27

## 2023-09-27 ENCOUNTER — NON-APPOINTMENT (OUTPATIENT)
Age: 53
End: 2023-09-27

## 2023-09-27 ENCOUNTER — APPOINTMENT (OUTPATIENT)
Dept: HEMATOLOGY ONCOLOGY | Facility: CLINIC | Age: 53
End: 2023-09-27
Payer: COMMERCIAL

## 2023-09-27 VITALS
TEMPERATURE: 96.6 F | HEART RATE: 99 BPM | DIASTOLIC BLOOD PRESSURE: 77 MMHG | OXYGEN SATURATION: 96 % | SYSTOLIC BLOOD PRESSURE: 127 MMHG | WEIGHT: 182.98 LBS | RESPIRATION RATE: 16 BRPM | BODY MASS INDEX: 30.45 KG/M2

## 2023-09-27 LAB
BASOPHILS # BLD AUTO: 0 K/UL — SIGNIFICANT CHANGE UP (ref 0–0.2)
BASOPHILS NFR BLD AUTO: 0 % — SIGNIFICANT CHANGE UP (ref 0–2)
EOSINOPHIL # BLD AUTO: 0 K/UL — SIGNIFICANT CHANGE UP (ref 0–0.5)
EOSINOPHIL NFR BLD AUTO: 0 % — SIGNIFICANT CHANGE UP (ref 0–6)
HCT VFR BLD CALC: 35.3 % — SIGNIFICANT CHANGE UP (ref 34.5–45)
HGB BLD-MCNC: 12 G/DL — SIGNIFICANT CHANGE UP (ref 11.5–15.5)
LYMPHOCYTES # BLD AUTO: 1.18 K/UL — SIGNIFICANT CHANGE UP (ref 1–3.3)
LYMPHOCYTES # BLD AUTO: 6 % — LOW (ref 13–44)
MCHC RBC-ENTMCNC: 30.3 PG — SIGNIFICANT CHANGE UP (ref 27–34)
MCHC RBC-ENTMCNC: 34 G/DL — SIGNIFICANT CHANGE UP (ref 32–36)
MCV RBC AUTO: 89.1 FL — SIGNIFICANT CHANGE UP (ref 80–100)
METAMYELOCYTES # FLD: 3 % — HIGH (ref 0–0)
MONOCYTES # BLD AUTO: 0.2 K/UL — SIGNIFICANT CHANGE UP (ref 0–0.9)
MONOCYTES NFR BLD AUTO: 1 % — LOW (ref 2–14)
MYELOCYTES NFR BLD: 4 % — HIGH (ref 0–0)
NEUTROPHILS # BLD AUTO: 16.92 K/UL — HIGH (ref 1.8–7.4)
NEUTROPHILS NFR BLD AUTO: 85 % — HIGH (ref 43–77)
NEUTS BAND # BLD: 1 % — SIGNIFICANT CHANGE UP (ref 0–8)
NRBC # BLD: 0 /100 — SIGNIFICANT CHANGE UP (ref 0–0)
NRBC # BLD: SIGNIFICANT CHANGE UP /100 WBCS (ref 0–0)
PLAT MORPH BLD: NORMAL — SIGNIFICANT CHANGE UP
PLATELET # BLD AUTO: 251 K/UL — SIGNIFICANT CHANGE UP (ref 150–400)
RBC # BLD: 3.96 M/UL — SIGNIFICANT CHANGE UP (ref 3.8–5.2)
RBC # FLD: 15.8 % — HIGH (ref 10.3–14.5)
RBC BLD AUTO: SIGNIFICANT CHANGE UP
WBC # BLD: 19.68 K/UL — HIGH (ref 3.8–10.5)
WBC # FLD AUTO: 19.68 K/UL — HIGH (ref 3.8–10.5)

## 2023-09-27 PROCEDURE — 99214 OFFICE O/P EST MOD 30 MIN: CPT

## 2023-09-29 ENCOUNTER — APPOINTMENT (OUTPATIENT)
Dept: PLASTIC SURGERY | Facility: CLINIC | Age: 53
End: 2023-09-29
Payer: COMMERCIAL

## 2023-09-29 VITALS
HEIGHT: 65 IN | WEIGHT: 182 LBS | DIASTOLIC BLOOD PRESSURE: 82 MMHG | OXYGEN SATURATION: 97 % | HEART RATE: 82 BPM | BODY MASS INDEX: 30.32 KG/M2 | SYSTOLIC BLOOD PRESSURE: 110 MMHG

## 2023-09-29 PROCEDURE — 99215 OFFICE O/P EST HI 40 MIN: CPT

## 2023-10-10 ENCOUNTER — NON-APPOINTMENT (OUTPATIENT)
Age: 53
End: 2023-10-10

## 2023-10-11 ENCOUNTER — RESULT REVIEW (OUTPATIENT)
Age: 53
End: 2023-10-11

## 2023-10-11 ENCOUNTER — APPOINTMENT (OUTPATIENT)
Dept: HEMATOLOGY ONCOLOGY | Facility: CLINIC | Age: 53
End: 2023-10-11

## 2023-10-11 ENCOUNTER — APPOINTMENT (OUTPATIENT)
Dept: INFUSION THERAPY | Facility: HOSPITAL | Age: 53
End: 2023-10-11

## 2023-10-11 ENCOUNTER — APPOINTMENT (OUTPATIENT)
Dept: HEMATOLOGY ONCOLOGY | Facility: CLINIC | Age: 53
End: 2023-10-11
Payer: COMMERCIAL

## 2023-10-11 VITALS
HEART RATE: 88 BPM | OXYGEN SATURATION: 97 % | BODY MASS INDEX: 30.67 KG/M2 | SYSTOLIC BLOOD PRESSURE: 105 MMHG | RESPIRATION RATE: 16 BRPM | HEIGHT: 65 IN | WEIGHT: 184.09 LBS | TEMPERATURE: 98 F | DIASTOLIC BLOOD PRESSURE: 70 MMHG

## 2023-10-11 DIAGNOSIS — R11.0 NAUSEA: ICD-10-CM

## 2023-10-11 LAB
BASOPHILS # BLD AUTO: 0 K/UL — SIGNIFICANT CHANGE UP (ref 0–0.2)
BASOPHILS NFR BLD AUTO: 0 % — SIGNIFICANT CHANGE UP (ref 0–2)
EOSINOPHIL # BLD AUTO: 0 K/UL — SIGNIFICANT CHANGE UP (ref 0–0.5)
EOSINOPHIL NFR BLD AUTO: 0 % — SIGNIFICANT CHANGE UP (ref 0–6)
HCT VFR BLD CALC: 31.9 % — LOW (ref 34.5–45)
HGB BLD-MCNC: 11.2 G/DL — LOW (ref 11.5–15.5)
LYMPHOCYTES # BLD AUTO: 0.6 K/UL — LOW (ref 1–3.3)
LYMPHOCYTES # BLD AUTO: 2 % — LOW (ref 13–44)
MCHC RBC-ENTMCNC: 31.6 PG — SIGNIFICANT CHANGE UP (ref 27–34)
MCHC RBC-ENTMCNC: 35.1 G/DL — SIGNIFICANT CHANGE UP (ref 32–36)
MCV RBC AUTO: 90.1 FL — SIGNIFICANT CHANGE UP (ref 80–100)
METAMYELOCYTES # FLD: 6 % — HIGH (ref 0–0)
MONOCYTES # BLD AUTO: 0.3 K/UL — SIGNIFICANT CHANGE UP (ref 0–0.9)
MONOCYTES NFR BLD AUTO: 1 % — LOW (ref 2–14)
MYELOCYTES NFR BLD: 4 % — HIGH (ref 0–0)
NEUTROPHILS # BLD AUTO: 26.1 K/UL — HIGH (ref 1.8–7.4)
NEUTROPHILS NFR BLD AUTO: 86 % — HIGH (ref 43–77)
NEUTS BAND # BLD: 1 % — SIGNIFICANT CHANGE UP (ref 0–8)
NRBC # BLD: 0 /100 — SIGNIFICANT CHANGE UP (ref 0–0)
NRBC # BLD: SIGNIFICANT CHANGE UP /100 WBCS (ref 0–0)
PLAT MORPH BLD: NORMAL — SIGNIFICANT CHANGE UP
PLATELET # BLD AUTO: 153 K/UL — SIGNIFICANT CHANGE UP (ref 150–400)
RBC # BLD: 3.54 M/UL — LOW (ref 3.8–5.2)
RBC # FLD: 15.9 % — HIGH (ref 10.3–14.5)
RBC BLD AUTO: SIGNIFICANT CHANGE UP
WBC # BLD: 30 K/UL — HIGH (ref 3.8–10.5)
WBC # FLD AUTO: 30 K/UL — HIGH (ref 3.8–10.5)

## 2023-10-11 PROCEDURE — 99214 OFFICE O/P EST MOD 30 MIN: CPT

## 2023-10-14 ENCOUNTER — APPOINTMENT (OUTPATIENT)
Dept: MRI IMAGING | Facility: CLINIC | Age: 53
End: 2023-10-14
Payer: COMMERCIAL

## 2023-10-14 ENCOUNTER — OUTPATIENT (OUTPATIENT)
Dept: OUTPATIENT SERVICES | Facility: HOSPITAL | Age: 53
LOS: 1 days | End: 2023-10-14
Payer: COMMERCIAL

## 2023-10-14 DIAGNOSIS — C50.811 MALIGNANT NEOPLASM OF OVERLAPPING SITES OF RIGHT FEMALE BREAST: ICD-10-CM

## 2023-10-14 DIAGNOSIS — C50.919 MALIGNANT NEOPLASM OF UNSPECIFIED SITE OF UNSPECIFIED FEMALE BREAST: ICD-10-CM

## 2023-10-14 DIAGNOSIS — Z90.10 ACQUIRED ABSENCE OF UNSPECIFIED BREAST AND NIPPLE: ICD-10-CM

## 2023-10-14 DIAGNOSIS — Z00.8 ENCOUNTER FOR OTHER GENERAL EXAMINATION: ICD-10-CM

## 2023-10-14 DIAGNOSIS — Z01.818 ENCOUNTER FOR OTHER PREPROCEDURAL EXAMINATION: ICD-10-CM

## 2023-10-14 PROCEDURE — 74185 MRA ABD W OR W/O CNTRST: CPT | Mod: 26

## 2023-10-14 PROCEDURE — C8920: CPT

## 2023-10-14 PROCEDURE — A9585: CPT

## 2023-10-14 PROCEDURE — C8902: CPT

## 2023-10-14 PROCEDURE — 72198 MR ANGIO PELVIS W/O & W/DYE: CPT | Mod: 26

## 2023-10-25 ENCOUNTER — RESULT REVIEW (OUTPATIENT)
Age: 53
End: 2023-10-25

## 2023-10-25 ENCOUNTER — APPOINTMENT (OUTPATIENT)
Dept: INFUSION THERAPY | Facility: HOSPITAL | Age: 53
End: 2023-10-25

## 2023-10-25 ENCOUNTER — APPOINTMENT (OUTPATIENT)
Dept: HEMATOLOGY ONCOLOGY | Facility: CLINIC | Age: 53
End: 2023-10-25
Payer: COMMERCIAL

## 2023-10-25 VITALS
HEIGHT: 65 IN | BODY MASS INDEX: 30.67 KG/M2 | SYSTOLIC BLOOD PRESSURE: 125 MMHG | HEART RATE: 78 BPM | RESPIRATION RATE: 16 BRPM | WEIGHT: 184.09 LBS | DIASTOLIC BLOOD PRESSURE: 83 MMHG | OXYGEN SATURATION: 98 % | TEMPERATURE: 96.8 F

## 2023-10-25 LAB
BASOPHILS # BLD AUTO: 0.01 K/UL — SIGNIFICANT CHANGE UP (ref 0–0.2)
BASOPHILS # BLD AUTO: 0.01 K/UL — SIGNIFICANT CHANGE UP (ref 0–0.2)
BASOPHILS NFR BLD AUTO: 0.3 % — SIGNIFICANT CHANGE UP (ref 0–2)
BASOPHILS NFR BLD AUTO: 0.3 % — SIGNIFICANT CHANGE UP (ref 0–2)
EOSINOPHIL # BLD AUTO: 0 K/UL — SIGNIFICANT CHANGE UP (ref 0–0.5)
EOSINOPHIL # BLD AUTO: 0 K/UL — SIGNIFICANT CHANGE UP (ref 0–0.5)
EOSINOPHIL NFR BLD AUTO: 0 % — SIGNIFICANT CHANGE UP (ref 0–6)
EOSINOPHIL NFR BLD AUTO: 0 % — SIGNIFICANT CHANGE UP (ref 0–6)
HCT VFR BLD CALC: 33.1 % — LOW (ref 34.5–45)
HCT VFR BLD CALC: 33.1 % — LOW (ref 34.5–45)
HGB BLD-MCNC: 11.5 G/DL — SIGNIFICANT CHANGE UP (ref 11.5–15.5)
HGB BLD-MCNC: 11.5 G/DL — SIGNIFICANT CHANGE UP (ref 11.5–15.5)
IMM GRANULOCYTES NFR BLD AUTO: 0.5 % — SIGNIFICANT CHANGE UP (ref 0–0.9)
IMM GRANULOCYTES NFR BLD AUTO: 0.5 % — SIGNIFICANT CHANGE UP (ref 0–0.9)
LYMPHOCYTES # BLD AUTO: 0.22 K/UL — LOW (ref 1–3.3)
LYMPHOCYTES # BLD AUTO: 0.22 K/UL — LOW (ref 1–3.3)
LYMPHOCYTES # BLD AUTO: 5.8 % — LOW (ref 13–44)
LYMPHOCYTES # BLD AUTO: 5.8 % — LOW (ref 13–44)
MCHC RBC-ENTMCNC: 31.1 PG — SIGNIFICANT CHANGE UP (ref 27–34)
MCHC RBC-ENTMCNC: 31.1 PG — SIGNIFICANT CHANGE UP (ref 27–34)
MCHC RBC-ENTMCNC: 34.7 G/DL — SIGNIFICANT CHANGE UP (ref 32–36)
MCHC RBC-ENTMCNC: 34.7 G/DL — SIGNIFICANT CHANGE UP (ref 32–36)
MCV RBC AUTO: 89.5 FL — SIGNIFICANT CHANGE UP (ref 80–100)
MCV RBC AUTO: 89.5 FL — SIGNIFICANT CHANGE UP (ref 80–100)
MONOCYTES # BLD AUTO: 0.04 K/UL — SIGNIFICANT CHANGE UP (ref 0–0.9)
MONOCYTES # BLD AUTO: 0.04 K/UL — SIGNIFICANT CHANGE UP (ref 0–0.9)
MONOCYTES NFR BLD AUTO: 1 % — LOW (ref 2–14)
MONOCYTES NFR BLD AUTO: 1 % — LOW (ref 2–14)
NEUTROPHILS # BLD AUTO: 3.52 K/UL — SIGNIFICANT CHANGE UP (ref 1.8–7.4)
NEUTROPHILS # BLD AUTO: 3.52 K/UL — SIGNIFICANT CHANGE UP (ref 1.8–7.4)
NEUTROPHILS NFR BLD AUTO: 92.4 % — HIGH (ref 43–77)
NEUTROPHILS NFR BLD AUTO: 92.4 % — HIGH (ref 43–77)
NRBC # BLD: 0 /100 WBCS — SIGNIFICANT CHANGE UP (ref 0–0)
NRBC # BLD: 0 /100 WBCS — SIGNIFICANT CHANGE UP (ref 0–0)
PLATELET # BLD AUTO: 232 K/UL — SIGNIFICANT CHANGE UP (ref 150–400)
PLATELET # BLD AUTO: 232 K/UL — SIGNIFICANT CHANGE UP (ref 150–400)
RBC # BLD: 3.7 M/UL — LOW (ref 3.8–5.2)
RBC # BLD: 3.7 M/UL — LOW (ref 3.8–5.2)
RBC # FLD: 14 % — SIGNIFICANT CHANGE UP (ref 10.3–14.5)
RBC # FLD: 14 % — SIGNIFICANT CHANGE UP (ref 10.3–14.5)
WBC # BLD: 3.81 K/UL — SIGNIFICANT CHANGE UP (ref 3.8–10.5)
WBC # BLD: 3.81 K/UL — SIGNIFICANT CHANGE UP (ref 3.8–10.5)
WBC # FLD AUTO: 3.81 K/UL — SIGNIFICANT CHANGE UP (ref 3.8–10.5)
WBC # FLD AUTO: 3.81 K/UL — SIGNIFICANT CHANGE UP (ref 3.8–10.5)

## 2023-10-25 PROCEDURE — 99214 OFFICE O/P EST MOD 30 MIN: CPT

## 2023-10-27 ENCOUNTER — APPOINTMENT (OUTPATIENT)
Dept: CARDIOLOGY | Facility: CLINIC | Age: 53
End: 2023-10-27
Payer: COMMERCIAL

## 2023-10-27 VITALS
HEIGHT: 65 IN | DIASTOLIC BLOOD PRESSURE: 71 MMHG | SYSTOLIC BLOOD PRESSURE: 106 MMHG | BODY MASS INDEX: 30.32 KG/M2 | HEART RATE: 88 BPM | WEIGHT: 182 LBS | OXYGEN SATURATION: 100 %

## 2023-10-27 PROCEDURE — 93000 ELECTROCARDIOGRAM COMPLETE: CPT

## 2023-10-27 PROCEDURE — 99204 OFFICE O/P NEW MOD 45 MIN: CPT

## 2023-10-31 ENCOUNTER — NON-APPOINTMENT (OUTPATIENT)
Age: 53
End: 2023-10-31

## 2023-11-01 ENCOUNTER — APPOINTMENT (OUTPATIENT)
Dept: BREAST CENTER | Facility: CLINIC | Age: 53
End: 2023-11-01
Payer: COMMERCIAL

## 2023-11-01 ENCOUNTER — OUTPATIENT (OUTPATIENT)
Dept: OUTPATIENT SERVICES | Facility: HOSPITAL | Age: 53
LOS: 1 days | Discharge: HOME | End: 2023-11-01

## 2023-11-01 ENCOUNTER — APPOINTMENT (OUTPATIENT)
Dept: RADIATION ONCOLOGY | Facility: CLINIC | Age: 53
End: 2023-11-01
Payer: COMMERCIAL

## 2023-11-01 VITALS
WEIGHT: 182 LBS | DIASTOLIC BLOOD PRESSURE: 70 MMHG | HEIGHT: 65.5 IN | BODY MASS INDEX: 29.96 KG/M2 | HEART RATE: 83 BPM | SYSTOLIC BLOOD PRESSURE: 122 MMHG

## 2023-11-01 VITALS
SYSTOLIC BLOOD PRESSURE: 107 MMHG | HEIGHT: 65 IN | WEIGHT: 182 LBS | BODY MASS INDEX: 30.32 KG/M2 | RESPIRATION RATE: 17 BRPM | TEMPERATURE: 96.98 F | DIASTOLIC BLOOD PRESSURE: 73 MMHG | OXYGEN SATURATION: 100 % | HEART RATE: 82 BPM

## 2023-11-01 DIAGNOSIS — C50.919 MALIGNANT NEOPLASM OF UNSPECIFIED SITE OF UNSPECIFIED FEMALE BREAST: ICD-10-CM

## 2023-11-01 PROCEDURE — 99215 OFFICE O/P EST HI 40 MIN: CPT

## 2023-11-01 PROCEDURE — 99204 OFFICE O/P NEW MOD 45 MIN: CPT | Mod: 25

## 2023-11-07 ENCOUNTER — NON-APPOINTMENT (OUTPATIENT)
Age: 53
End: 2023-11-07

## 2023-11-08 ENCOUNTER — APPOINTMENT (OUTPATIENT)
Dept: HEMATOLOGY ONCOLOGY | Facility: CLINIC | Age: 53
End: 2023-11-08
Payer: COMMERCIAL

## 2023-11-08 ENCOUNTER — APPOINTMENT (OUTPATIENT)
Dept: INFUSION THERAPY | Facility: HOSPITAL | Age: 53
End: 2023-11-08

## 2023-11-08 ENCOUNTER — RESULT REVIEW (OUTPATIENT)
Age: 53
End: 2023-11-08

## 2023-11-08 VITALS
HEIGHT: 65 IN | BODY MASS INDEX: 30.85 KG/M2 | RESPIRATION RATE: 16 BRPM | SYSTOLIC BLOOD PRESSURE: 112 MMHG | TEMPERATURE: 97.4 F | HEART RATE: 97 BPM | DIASTOLIC BLOOD PRESSURE: 70 MMHG | WEIGHT: 185.19 LBS | OXYGEN SATURATION: 100 %

## 2023-11-08 DIAGNOSIS — Z79.899 OTHER LONG TERM (CURRENT) DRUG THERAPY: ICD-10-CM

## 2023-11-08 DIAGNOSIS — K59.00 CONSTIPATION, UNSPECIFIED: ICD-10-CM

## 2023-11-08 LAB
BASOPHILS # BLD AUTO: 0 K/UL — SIGNIFICANT CHANGE UP (ref 0–0.2)
BASOPHILS # BLD AUTO: 0 K/UL — SIGNIFICANT CHANGE UP (ref 0–0.2)
BASOPHILS NFR BLD AUTO: 0 % — SIGNIFICANT CHANGE UP (ref 0–2)
BASOPHILS NFR BLD AUTO: 0 % — SIGNIFICANT CHANGE UP (ref 0–2)
EOSINOPHIL # BLD AUTO: 0 K/UL — SIGNIFICANT CHANGE UP (ref 0–0.5)
EOSINOPHIL # BLD AUTO: 0 K/UL — SIGNIFICANT CHANGE UP (ref 0–0.5)
EOSINOPHIL NFR BLD AUTO: 0 % — SIGNIFICANT CHANGE UP (ref 0–6)
EOSINOPHIL NFR BLD AUTO: 0 % — SIGNIFICANT CHANGE UP (ref 0–6)
HCT VFR BLD CALC: 33.8 % — LOW (ref 34.5–45)
HCT VFR BLD CALC: 33.8 % — LOW (ref 34.5–45)
HGB BLD-MCNC: 11.7 G/DL — SIGNIFICANT CHANGE UP (ref 11.5–15.5)
HGB BLD-MCNC: 11.7 G/DL — SIGNIFICANT CHANGE UP (ref 11.5–15.5)
LYMPHOCYTES # BLD AUTO: 0.42 K/UL — LOW (ref 1–3.3)
LYMPHOCYTES # BLD AUTO: 0.42 K/UL — LOW (ref 1–3.3)
LYMPHOCYTES # BLD AUTO: 2 % — LOW (ref 13–44)
LYMPHOCYTES # BLD AUTO: 2 % — LOW (ref 13–44)
MCHC RBC-ENTMCNC: 31.2 PG — SIGNIFICANT CHANGE UP (ref 27–34)
MCHC RBC-ENTMCNC: 31.2 PG — SIGNIFICANT CHANGE UP (ref 27–34)
MCHC RBC-ENTMCNC: 34.6 G/DL — SIGNIFICANT CHANGE UP (ref 32–36)
MCHC RBC-ENTMCNC: 34.6 G/DL — SIGNIFICANT CHANGE UP (ref 32–36)
MCV RBC AUTO: 90.1 FL — SIGNIFICANT CHANGE UP (ref 80–100)
MCV RBC AUTO: 90.1 FL — SIGNIFICANT CHANGE UP (ref 80–100)
METAMYELOCYTES # FLD: 3 % — HIGH (ref 0–0)
METAMYELOCYTES # FLD: 3 % — HIGH (ref 0–0)
MONOCYTES # BLD AUTO: 0.21 K/UL — SIGNIFICANT CHANGE UP (ref 0–0.9)
MONOCYTES # BLD AUTO: 0.21 K/UL — SIGNIFICANT CHANGE UP (ref 0–0.9)
MONOCYTES NFR BLD AUTO: 1 % — LOW (ref 2–14)
MONOCYTES NFR BLD AUTO: 1 % — LOW (ref 2–14)
MYELOCYTES NFR BLD: 1 % — HIGH (ref 0–0)
MYELOCYTES NFR BLD: 1 % — HIGH (ref 0–0)
NEUTROPHILS # BLD AUTO: 19.39 K/UL — HIGH (ref 1.8–7.4)
NEUTROPHILS # BLD AUTO: 19.39 K/UL — HIGH (ref 1.8–7.4)
NEUTROPHILS NFR BLD AUTO: 93 % — HIGH (ref 43–77)
NEUTROPHILS NFR BLD AUTO: 93 % — HIGH (ref 43–77)
NRBC # BLD: 0 /100 — SIGNIFICANT CHANGE UP (ref 0–0)
NRBC # BLD: 0 /100 — SIGNIFICANT CHANGE UP (ref 0–0)
NRBC # BLD: SIGNIFICANT CHANGE UP /100 WBCS (ref 0–0)
NRBC # BLD: SIGNIFICANT CHANGE UP /100 WBCS (ref 0–0)
PLAT MORPH BLD: NORMAL — SIGNIFICANT CHANGE UP
PLAT MORPH BLD: NORMAL — SIGNIFICANT CHANGE UP
PLATELET # BLD AUTO: 136 K/UL — LOW (ref 150–400)
PLATELET # BLD AUTO: 136 K/UL — LOW (ref 150–400)
RBC # BLD: 3.75 M/UL — LOW (ref 3.8–5.2)
RBC # BLD: 3.75 M/UL — LOW (ref 3.8–5.2)
RBC # FLD: 13.5 % — SIGNIFICANT CHANGE UP (ref 10.3–14.5)
RBC # FLD: 13.5 % — SIGNIFICANT CHANGE UP (ref 10.3–14.5)
RBC BLD AUTO: SIGNIFICANT CHANGE UP
RBC BLD AUTO: SIGNIFICANT CHANGE UP
WBC # BLD: 20.85 K/UL — HIGH (ref 3.8–10.5)
WBC # BLD: 20.85 K/UL — HIGH (ref 3.8–10.5)
WBC # FLD AUTO: 20.85 K/UL — HIGH (ref 3.8–10.5)
WBC # FLD AUTO: 20.85 K/UL — HIGH (ref 3.8–10.5)

## 2023-11-08 PROCEDURE — 99214 OFFICE O/P EST MOD 30 MIN: CPT

## 2023-11-09 DIAGNOSIS — Z51.11 ENCOUNTER FOR ANTINEOPLASTIC CHEMOTHERAPY: ICD-10-CM

## 2023-11-09 DIAGNOSIS — R11.2 NAUSEA WITH VOMITING, UNSPECIFIED: ICD-10-CM

## 2023-11-10 ENCOUNTER — APPOINTMENT (OUTPATIENT)
Dept: PLASTIC SURGERY | Facility: CLINIC | Age: 53
End: 2023-11-10
Payer: COMMERCIAL

## 2023-11-10 VITALS
HEIGHT: 65 IN | DIASTOLIC BLOOD PRESSURE: 74 MMHG | SYSTOLIC BLOOD PRESSURE: 108 MMHG | TEMPERATURE: 97 F | BODY MASS INDEX: 30.82 KG/M2 | WEIGHT: 185 LBS | OXYGEN SATURATION: 98 % | HEART RATE: 87 BPM

## 2023-11-10 PROCEDURE — 99215 OFFICE O/P EST HI 40 MIN: CPT

## 2023-11-15 ENCOUNTER — APPOINTMENT (OUTPATIENT)
Dept: SURGERY | Facility: CLINIC | Age: 53
End: 2023-11-15

## 2023-11-17 ENCOUNTER — APPOINTMENT (OUTPATIENT)
Dept: CARDIOLOGY | Facility: CLINIC | Age: 53
End: 2023-11-17
Payer: COMMERCIAL

## 2023-11-17 DIAGNOSIS — R06.09 OTHER FORMS OF DYSPNEA: ICD-10-CM

## 2023-11-17 DIAGNOSIS — R07.89 OTHER CHEST PAIN: ICD-10-CM

## 2023-11-17 PROCEDURE — 93306 TTE W/DOPPLER COMPLETE: CPT

## 2023-11-26 PROBLEM — R06.09 DYSPNEA ON EXERTION: Status: ACTIVE | Noted: 2023-10-27

## 2023-11-26 PROBLEM — R07.89 ATYPICAL CHEST PAIN: Status: ACTIVE | Noted: 2021-03-08

## 2023-11-27 ENCOUNTER — NON-APPOINTMENT (OUTPATIENT)
Age: 53
End: 2023-11-27

## 2023-12-06 ENCOUNTER — RESULT REVIEW (OUTPATIENT)
Age: 53
End: 2023-12-06

## 2023-12-06 ENCOUNTER — NON-APPOINTMENT (OUTPATIENT)
Age: 53
End: 2023-12-06

## 2023-12-06 ENCOUNTER — OUTPATIENT (OUTPATIENT)
Dept: OUTPATIENT SERVICES | Facility: HOSPITAL | Age: 53
LOS: 1 days | End: 2023-12-06
Payer: COMMERCIAL

## 2023-12-06 DIAGNOSIS — Z98.890 OTHER SPECIFIED POSTPROCEDURAL STATES: Chronic | ICD-10-CM

## 2023-12-06 DIAGNOSIS — Z01.818 ENCOUNTER FOR OTHER PREPROCEDURAL EXAMINATION: ICD-10-CM

## 2023-12-06 LAB
A1C WITH ESTIMATED AVERAGE GLUCOSE RESULT: 4.9 % — SIGNIFICANT CHANGE UP (ref 4–5.6)
A1C WITH ESTIMATED AVERAGE GLUCOSE RESULT: 4.9 % — SIGNIFICANT CHANGE UP (ref 4–5.6)
ABO RH CONFIRMATION: SIGNIFICANT CHANGE UP
ABO RH CONFIRMATION: SIGNIFICANT CHANGE UP
ALBUMIN SERPL ELPH-MCNC: 3.6 G/DL — SIGNIFICANT CHANGE UP (ref 3.3–5)
ALBUMIN SERPL ELPH-MCNC: 3.6 G/DL — SIGNIFICANT CHANGE UP (ref 3.3–5)
ALP SERPL-CCNC: 66 U/L — SIGNIFICANT CHANGE UP (ref 40–120)
ALP SERPL-CCNC: 66 U/L — SIGNIFICANT CHANGE UP (ref 40–120)
ALT FLD-CCNC: 31 U/L — SIGNIFICANT CHANGE UP (ref 12–78)
ALT FLD-CCNC: 31 U/L — SIGNIFICANT CHANGE UP (ref 12–78)
ANION GAP SERPL CALC-SCNC: 4 MMOL/L — LOW (ref 5–17)
ANION GAP SERPL CALC-SCNC: 4 MMOL/L — LOW (ref 5–17)
APPEARANCE UR: CLEAR — SIGNIFICANT CHANGE UP
APPEARANCE UR: CLEAR — SIGNIFICANT CHANGE UP
APTT BLD: 31.8 SEC — SIGNIFICANT CHANGE UP (ref 24.5–35.6)
APTT BLD: 31.8 SEC — SIGNIFICANT CHANGE UP (ref 24.5–35.6)
AST SERPL-CCNC: 12 U/L — LOW (ref 15–37)
AST SERPL-CCNC: 12 U/L — LOW (ref 15–37)
BACTERIA # UR AUTO: NEGATIVE /HPF — SIGNIFICANT CHANGE UP
BACTERIA # UR AUTO: NEGATIVE /HPF — SIGNIFICANT CHANGE UP
BASOPHILS # BLD AUTO: 0.1 K/UL — SIGNIFICANT CHANGE UP (ref 0–0.2)
BASOPHILS # BLD AUTO: 0.1 K/UL — SIGNIFICANT CHANGE UP (ref 0–0.2)
BASOPHILS NFR BLD AUTO: 2 % — SIGNIFICANT CHANGE UP (ref 0–2)
BASOPHILS NFR BLD AUTO: 2 % — SIGNIFICANT CHANGE UP (ref 0–2)
BILIRUB SERPL-MCNC: 0.4 MG/DL — SIGNIFICANT CHANGE UP (ref 0.2–1.2)
BILIRUB SERPL-MCNC: 0.4 MG/DL — SIGNIFICANT CHANGE UP (ref 0.2–1.2)
BILIRUB UR-MCNC: NEGATIVE — SIGNIFICANT CHANGE UP
BILIRUB UR-MCNC: NEGATIVE — SIGNIFICANT CHANGE UP
BLD GP AB SCN SERPL QL: SIGNIFICANT CHANGE UP
BLD GP AB SCN SERPL QL: SIGNIFICANT CHANGE UP
BUN SERPL-MCNC: 18 MG/DL — SIGNIFICANT CHANGE UP (ref 7–23)
BUN SERPL-MCNC: 18 MG/DL — SIGNIFICANT CHANGE UP (ref 7–23)
CALCIUM SERPL-MCNC: 9.5 MG/DL — SIGNIFICANT CHANGE UP (ref 8.5–10.1)
CALCIUM SERPL-MCNC: 9.5 MG/DL — SIGNIFICANT CHANGE UP (ref 8.5–10.1)
CAST: 0 /LPF — SIGNIFICANT CHANGE UP (ref 0–4)
CAST: 0 /LPF — SIGNIFICANT CHANGE UP (ref 0–4)
CHLORIDE SERPL-SCNC: 109 MMOL/L — HIGH (ref 96–108)
CHLORIDE SERPL-SCNC: 109 MMOL/L — HIGH (ref 96–108)
CO2 SERPL-SCNC: 28 MMOL/L — SIGNIFICANT CHANGE UP (ref 22–31)
CO2 SERPL-SCNC: 28 MMOL/L — SIGNIFICANT CHANGE UP (ref 22–31)
COLOR SPEC: YELLOW — SIGNIFICANT CHANGE UP
COLOR SPEC: YELLOW — SIGNIFICANT CHANGE UP
CREAT SERPL-MCNC: 0.55 MG/DL — SIGNIFICANT CHANGE UP (ref 0.5–1.3)
CREAT SERPL-MCNC: 0.55 MG/DL — SIGNIFICANT CHANGE UP (ref 0.5–1.3)
DIFF PNL FLD: NEGATIVE — SIGNIFICANT CHANGE UP
DIFF PNL FLD: NEGATIVE — SIGNIFICANT CHANGE UP
EGFR: 110 ML/MIN/1.73M2 — SIGNIFICANT CHANGE UP
EGFR: 110 ML/MIN/1.73M2 — SIGNIFICANT CHANGE UP
EOSINOPHIL # BLD AUTO: 0.21 K/UL — SIGNIFICANT CHANGE UP (ref 0–0.5)
EOSINOPHIL # BLD AUTO: 0.21 K/UL — SIGNIFICANT CHANGE UP (ref 0–0.5)
EOSINOPHIL NFR BLD AUTO: 4.3 % — SIGNIFICANT CHANGE UP (ref 0–6)
EOSINOPHIL NFR BLD AUTO: 4.3 % — SIGNIFICANT CHANGE UP (ref 0–6)
ESTIMATED AVERAGE GLUCOSE: 94 MG/DL — SIGNIFICANT CHANGE UP (ref 68–114)
ESTIMATED AVERAGE GLUCOSE: 94 MG/DL — SIGNIFICANT CHANGE UP (ref 68–114)
GLUCOSE SERPL-MCNC: 110 MG/DL — HIGH (ref 70–99)
GLUCOSE SERPL-MCNC: 110 MG/DL — HIGH (ref 70–99)
GLUCOSE UR QL: NEGATIVE MG/DL — SIGNIFICANT CHANGE UP
GLUCOSE UR QL: NEGATIVE MG/DL — SIGNIFICANT CHANGE UP
HCT VFR BLD CALC: 39.3 % — SIGNIFICANT CHANGE UP (ref 34.5–45)
HCT VFR BLD CALC: 39.3 % — SIGNIFICANT CHANGE UP (ref 34.5–45)
HGB BLD-MCNC: 13 G/DL — SIGNIFICANT CHANGE UP (ref 11.5–15.5)
HGB BLD-MCNC: 13 G/DL — SIGNIFICANT CHANGE UP (ref 11.5–15.5)
IMM GRANULOCYTES NFR BLD AUTO: 0.2 % — SIGNIFICANT CHANGE UP (ref 0–0.9)
IMM GRANULOCYTES NFR BLD AUTO: 0.2 % — SIGNIFICANT CHANGE UP (ref 0–0.9)
INR BLD: 0.99 RATIO — SIGNIFICANT CHANGE UP (ref 0.85–1.18)
INR BLD: 0.99 RATIO — SIGNIFICANT CHANGE UP (ref 0.85–1.18)
KETONES UR-MCNC: ABNORMAL MG/DL
KETONES UR-MCNC: ABNORMAL MG/DL
LEUKOCYTE ESTERASE UR-ACNC: ABNORMAL
LEUKOCYTE ESTERASE UR-ACNC: ABNORMAL
LYMPHOCYTES # BLD AUTO: 0.98 K/UL — LOW (ref 1–3.3)
LYMPHOCYTES # BLD AUTO: 0.98 K/UL — LOW (ref 1–3.3)
LYMPHOCYTES # BLD AUTO: 20 % — SIGNIFICANT CHANGE UP (ref 13–44)
LYMPHOCYTES # BLD AUTO: 20 % — SIGNIFICANT CHANGE UP (ref 13–44)
MCHC RBC-ENTMCNC: 29.7 PG — SIGNIFICANT CHANGE UP (ref 27–34)
MCHC RBC-ENTMCNC: 29.7 PG — SIGNIFICANT CHANGE UP (ref 27–34)
MCHC RBC-ENTMCNC: 33.1 GM/DL — SIGNIFICANT CHANGE UP (ref 32–36)
MCHC RBC-ENTMCNC: 33.1 GM/DL — SIGNIFICANT CHANGE UP (ref 32–36)
MCV RBC AUTO: 89.7 FL — SIGNIFICANT CHANGE UP (ref 80–100)
MCV RBC AUTO: 89.7 FL — SIGNIFICANT CHANGE UP (ref 80–100)
MONOCYTES # BLD AUTO: 0.39 K/UL — SIGNIFICANT CHANGE UP (ref 0–0.9)
MONOCYTES # BLD AUTO: 0.39 K/UL — SIGNIFICANT CHANGE UP (ref 0–0.9)
MONOCYTES NFR BLD AUTO: 7.9 % — SIGNIFICANT CHANGE UP (ref 2–14)
MONOCYTES NFR BLD AUTO: 7.9 % — SIGNIFICANT CHANGE UP (ref 2–14)
NEUTROPHILS # BLD AUTO: 3.22 K/UL — SIGNIFICANT CHANGE UP (ref 1.8–7.4)
NEUTROPHILS # BLD AUTO: 3.22 K/UL — SIGNIFICANT CHANGE UP (ref 1.8–7.4)
NEUTROPHILS NFR BLD AUTO: 65.6 % — SIGNIFICANT CHANGE UP (ref 43–77)
NEUTROPHILS NFR BLD AUTO: 65.6 % — SIGNIFICANT CHANGE UP (ref 43–77)
NITRITE UR-MCNC: NEGATIVE — SIGNIFICANT CHANGE UP
NITRITE UR-MCNC: NEGATIVE — SIGNIFICANT CHANGE UP
PH UR: 6 — SIGNIFICANT CHANGE UP (ref 5–8)
PH UR: 6 — SIGNIFICANT CHANGE UP (ref 5–8)
PLATELET # BLD AUTO: 241 K/UL — SIGNIFICANT CHANGE UP (ref 150–400)
PLATELET # BLD AUTO: 241 K/UL — SIGNIFICANT CHANGE UP (ref 150–400)
POTASSIUM SERPL-MCNC: 4.2 MMOL/L — SIGNIFICANT CHANGE UP (ref 3.5–5.3)
POTASSIUM SERPL-MCNC: 4.2 MMOL/L — SIGNIFICANT CHANGE UP (ref 3.5–5.3)
POTASSIUM SERPL-SCNC: 4.2 MMOL/L — SIGNIFICANT CHANGE UP (ref 3.5–5.3)
POTASSIUM SERPL-SCNC: 4.2 MMOL/L — SIGNIFICANT CHANGE UP (ref 3.5–5.3)
PROT SERPL-MCNC: 7.2 GM/DL — SIGNIFICANT CHANGE UP (ref 6–8.3)
PROT SERPL-MCNC: 7.2 GM/DL — SIGNIFICANT CHANGE UP (ref 6–8.3)
PROT UR-MCNC: NEGATIVE MG/DL — SIGNIFICANT CHANGE UP
PROT UR-MCNC: NEGATIVE MG/DL — SIGNIFICANT CHANGE UP
PROTHROM AB SERPL-ACNC: 11.2 SEC — SIGNIFICANT CHANGE UP (ref 9.5–13)
PROTHROM AB SERPL-ACNC: 11.2 SEC — SIGNIFICANT CHANGE UP (ref 9.5–13)
RBC # BLD: 4.38 M/UL — SIGNIFICANT CHANGE UP (ref 3.8–5.2)
RBC # BLD: 4.38 M/UL — SIGNIFICANT CHANGE UP (ref 3.8–5.2)
RBC # FLD: 12.7 % — SIGNIFICANT CHANGE UP (ref 10.3–14.5)
RBC # FLD: 12.7 % — SIGNIFICANT CHANGE UP (ref 10.3–14.5)
RBC CASTS # UR COMP ASSIST: 1 /HPF — SIGNIFICANT CHANGE UP (ref 0–4)
RBC CASTS # UR COMP ASSIST: 1 /HPF — SIGNIFICANT CHANGE UP (ref 0–4)
SODIUM SERPL-SCNC: 141 MMOL/L — SIGNIFICANT CHANGE UP (ref 135–145)
SODIUM SERPL-SCNC: 141 MMOL/L — SIGNIFICANT CHANGE UP (ref 135–145)
SP GR SPEC: 1.02 — SIGNIFICANT CHANGE UP (ref 1–1.03)
SP GR SPEC: 1.02 — SIGNIFICANT CHANGE UP (ref 1–1.03)
SQUAMOUS # UR AUTO: 0 /HPF — SIGNIFICANT CHANGE UP (ref 0–5)
SQUAMOUS # UR AUTO: 0 /HPF — SIGNIFICANT CHANGE UP (ref 0–5)
UROBILINOGEN FLD QL: 0.2 MG/DL — SIGNIFICANT CHANGE UP (ref 0.2–1)
UROBILINOGEN FLD QL: 0.2 MG/DL — SIGNIFICANT CHANGE UP (ref 0.2–1)
WBC # BLD: 4.91 K/UL — SIGNIFICANT CHANGE UP (ref 3.8–10.5)
WBC # BLD: 4.91 K/UL — SIGNIFICANT CHANGE UP (ref 3.8–10.5)
WBC # FLD AUTO: 4.91 K/UL — SIGNIFICANT CHANGE UP (ref 3.8–10.5)
WBC # FLD AUTO: 4.91 K/UL — SIGNIFICANT CHANGE UP (ref 3.8–10.5)
WBC UR QL: 2 /HPF — SIGNIFICANT CHANGE UP (ref 0–5)
WBC UR QL: 2 /HPF — SIGNIFICANT CHANGE UP (ref 0–5)

## 2023-12-06 PROCEDURE — 80053 COMPREHEN METABOLIC PANEL: CPT

## 2023-12-06 PROCEDURE — 71046 X-RAY EXAM CHEST 2 VIEWS: CPT | Mod: 26

## 2023-12-06 PROCEDURE — 85730 THROMBOPLASTIN TIME PARTIAL: CPT

## 2023-12-06 PROCEDURE — 86900 BLOOD TYPING SEROLOGIC ABO: CPT

## 2023-12-06 PROCEDURE — 36415 COLL VENOUS BLD VENIPUNCTURE: CPT

## 2023-12-06 PROCEDURE — 81001 URINALYSIS AUTO W/SCOPE: CPT

## 2023-12-06 PROCEDURE — 71046 X-RAY EXAM CHEST 2 VIEWS: CPT

## 2023-12-06 PROCEDURE — 83036 HEMOGLOBIN GLYCOSYLATED A1C: CPT

## 2023-12-06 PROCEDURE — 85610 PROTHROMBIN TIME: CPT

## 2023-12-06 PROCEDURE — 86850 RBC ANTIBODY SCREEN: CPT

## 2023-12-06 PROCEDURE — 85025 COMPLETE CBC W/AUTO DIFF WBC: CPT

## 2023-12-06 PROCEDURE — 86901 BLOOD TYPING SEROLOGIC RH(D): CPT

## 2023-12-06 NOTE — CHART NOTE - NSCHARTNOTEFT_GEN_A_CORE
53 y.o female scheduled for Bilateral Mastectomies with Bilateral Magtrace, Right Axillary Bellamy Node Biopsy, possible Right Axillary Dissection (Dr Christoph Clay) . Bilateral JENNIFFER Flap Reconstruction (Dr Evans)   VS: BP- 114/72  P- 80  T- 97.5  SpO2--100%  Plan  1. Stop all NSAIDS, herbal supplements and vitamins for 7 days.  2. NPO at midnight.  3. Take the following medications --none-- with small sips of water on the morning of your procedure/surgery.  4. Use EZ sponges as directed  5. Labs, EKG, CXR as per surgeon  6. PMD JOAN Hammer visit for optimization prior to surgery as per surgeon  7. UCG STAT on admit   8. Preprocedue education provided 53 y.o female scheduled for Bilateral Mastectomies with Bilateral Magtrace, Right Axillary Arabi Node Biopsy, possible Right Axillary Dissection (Dr Christoph Clay) . Bilateral JENNIFFER Flap Reconstruction (Dr Evans)   VS: BP- 114/72  P- 80  T- 97.5  SpO2--100%  Plan  1. Stop all NSAIDS, herbal supplements and vitamins for 7 days.  2. NPO at midnight.  3. Take the following medications --none-- with small sips of water on the morning of your procedure/surgery.  4. Use EZ sponges as directed  5. Labs, EKG, CXR as per surgeon  6. PMD JOAN Hammer visit for optimization prior to surgery as per surgeon  7. UCG STAT on admit   8. Preprocedue education provided

## 2023-12-06 NOTE — PATIENT PROFILE ADULT - FALL HARM RISK - UNIVERSAL INTERVENTIONS
Bed in lowest position, wheels locked, appropriate side rails in place/Call bell, personal items and telephone in reach/Instruct patient to call for assistance before getting out of bed or chair/Non-slip footwear when patient is out of bed/[place] to call system/Physically safe environment - no spills, clutter or unnecessary equipment/Purposeful Proactive Rounding/Room/bathroom lighting operational, light cord in reach Bed in lowest position, wheels locked, appropriate side rails in place/Call bell, personal items and telephone in reach/Instruct patient to call for assistance before getting out of bed or chair/Non-slip footwear when patient is out of bed/[place] to call system/Physically safe environment - no spills, clutter or unnecessary equipment/Purposeful Proactive Rounding/Room/bathroom lighting operational, light cord in reach

## 2023-12-06 NOTE — PATIENT PROFILE ADULT - HEALTH LITERACY
Written and verbal education given to pt and her friend. Reviewed lab values, when to contact the office, nutrition, bowel habits, hand/foot syndrome. Echo orders placed per vo Dr. Zeyad Vogt. All questions answered, consent signed and scanned into CC. Pt has Scarlett CHURCHT at home. no

## 2023-12-07 ENCOUNTER — APPOINTMENT (OUTPATIENT)
Dept: ULTRASOUND IMAGING | Facility: CLINIC | Age: 53
End: 2023-12-07
Payer: COMMERCIAL

## 2023-12-07 ENCOUNTER — RESULT REVIEW (OUTPATIENT)
Age: 53
End: 2023-12-07

## 2023-12-07 ENCOUNTER — NON-APPOINTMENT (OUTPATIENT)
Age: 53
End: 2023-12-07

## 2023-12-07 ENCOUNTER — OUTPATIENT (OUTPATIENT)
Dept: OUTPATIENT SERVICES | Facility: HOSPITAL | Age: 53
LOS: 1 days | End: 2023-12-07
Payer: COMMERCIAL

## 2023-12-07 DIAGNOSIS — Z98.890 OTHER SPECIFIED POSTPROCEDURAL STATES: Chronic | ICD-10-CM

## 2023-12-07 DIAGNOSIS — C50.911 MALIGNANT NEOPLASM OF UNSPECIFIED SITE OF RIGHT FEMALE BREAST: ICD-10-CM

## 2023-12-07 DIAGNOSIS — C50.811 MALIGNANT NEOPLASM OF OVERLAPPING SITES OF RIGHT FEMALE BREAST: ICD-10-CM

## 2023-12-07 DIAGNOSIS — Z01.818 ENCOUNTER FOR OTHER PREPROCEDURAL EXAMINATION: ICD-10-CM

## 2023-12-07 PROBLEM — Z87.898 PERSONAL HISTORY OF OTHER SPECIFIED CONDITIONS: Chronic | Status: ACTIVE | Noted: 2023-12-06

## 2023-12-07 PROBLEM — D64.9 ANEMIA, UNSPECIFIED: Chronic | Status: ACTIVE | Noted: 2023-12-06

## 2023-12-07 PROBLEM — U07.1 COVID-19: Chronic | Status: ACTIVE | Noted: 2023-12-06

## 2023-12-07 PROBLEM — J45.909 UNSPECIFIED ASTHMA, UNCOMPLICATED: Chronic | Status: ACTIVE | Noted: 2023-12-06

## 2023-12-07 PROBLEM — Z92.21 PERSONAL HISTORY OF ANTINEOPLASTIC CHEMOTHERAPY: Chronic | Status: ACTIVE | Noted: 2023-12-06

## 2023-12-07 PROBLEM — Z95.828 PRESENCE OF OTHER VASCULAR IMPLANTS AND GRAFTS: Chronic | Status: ACTIVE | Noted: 2023-12-06

## 2023-12-07 PROBLEM — E78.5 HYPERLIPIDEMIA, UNSPECIFIED: Chronic | Status: ACTIVE | Noted: 2023-12-06

## 2023-12-07 PROBLEM — R73.03 PREDIABETES: Chronic | Status: ACTIVE | Noted: 2023-12-06

## 2023-12-07 PROCEDURE — 19285 PERQ DEV BREAST 1ST US IMAG: CPT

## 2023-12-07 PROCEDURE — 76641 ULTRASOUND BREAST COMPLETE: CPT | Mod: 26,RT

## 2023-12-07 PROCEDURE — 19285 PERQ DEV BREAST 1ST US IMAG: CPT | Mod: RT

## 2023-12-07 PROCEDURE — 76641 ULTRASOUND BREAST COMPLETE: CPT

## 2023-12-11 ENCOUNTER — NON-APPOINTMENT (OUTPATIENT)
Age: 53
End: 2023-12-11

## 2023-12-12 ENCOUNTER — APPOINTMENT (OUTPATIENT)
Dept: NUCLEAR MEDICINE | Facility: HOSPITAL | Age: 53
End: 2023-12-12

## 2023-12-12 ENCOUNTER — OUTPATIENT (OUTPATIENT)
Dept: OUTPATIENT SERVICES | Facility: HOSPITAL | Age: 53
LOS: 1 days | End: 2023-12-12
Payer: COMMERCIAL

## 2023-12-12 ENCOUNTER — RESULT REVIEW (OUTPATIENT)
Age: 53
End: 2023-12-12

## 2023-12-12 ENCOUNTER — APPOINTMENT (OUTPATIENT)
Dept: SURGERY | Facility: HOSPITAL | Age: 53
End: 2023-12-12

## 2023-12-12 DIAGNOSIS — Z98.890 OTHER SPECIFIED POSTPROCEDURAL STATES: Chronic | ICD-10-CM

## 2023-12-12 DIAGNOSIS — C50.811 MALIGNANT NEOPLASM OF OVERLAPPING SITES OF RIGHT FEMALE BREAST: ICD-10-CM

## 2023-12-12 PROCEDURE — 88321 CONSLTJ&REPRT SLD PREP ELSWR: CPT

## 2023-12-13 DIAGNOSIS — C50.811 MALIGNANT NEOPLASM OF OVERLAPPING SITES OF RIGHT FEMALE BREAST: ICD-10-CM

## 2023-12-14 ENCOUNTER — RESULT REVIEW (OUTPATIENT)
Age: 53
End: 2023-12-14

## 2023-12-14 ENCOUNTER — OUTPATIENT (OUTPATIENT)
Dept: INPATIENT UNIT | Facility: HOSPITAL | Age: 53
LOS: 1 days | Discharge: HOME | End: 2023-12-14
Payer: COMMERCIAL

## 2023-12-14 ENCOUNTER — TRANSCRIPTION ENCOUNTER (OUTPATIENT)
Age: 53
End: 2023-12-14

## 2023-12-14 ENCOUNTER — APPOINTMENT (OUTPATIENT)
Dept: BREAST CENTER | Facility: HOSPITAL | Age: 53
End: 2023-12-14

## 2023-12-14 ENCOUNTER — APPOINTMENT (OUTPATIENT)
Dept: PLASTIC SURGERY | Facility: HOSPITAL | Age: 53
End: 2023-12-14
Payer: COMMERCIAL

## 2023-12-14 VITALS
WEIGHT: 195.11 LBS | OXYGEN SATURATION: 97 % | RESPIRATION RATE: 16 BRPM | SYSTOLIC BLOOD PRESSURE: 139 MMHG | TEMPERATURE: 98 F | HEART RATE: 87 BPM | DIASTOLIC BLOOD PRESSURE: 87 MMHG | HEIGHT: 66 IN

## 2023-12-14 DIAGNOSIS — C50.811 MALIGNANT NEOPLASM OF OVERLAPPING SITES OF RIGHT FEMALE BREAST: ICD-10-CM

## 2023-12-14 DIAGNOSIS — Z98.890 OTHER SPECIFIED POSTPROCEDURAL STATES: Chronic | ICD-10-CM

## 2023-12-14 LAB
ALBUMIN SERPL ELPH-MCNC: 2.8 G/DL — LOW (ref 3.3–5)
ALBUMIN SERPL ELPH-MCNC: 2.8 G/DL — LOW (ref 3.3–5)
ALP SERPL-CCNC: 46 U/L — SIGNIFICANT CHANGE UP (ref 40–120)
ALP SERPL-CCNC: 46 U/L — SIGNIFICANT CHANGE UP (ref 40–120)
ALT FLD-CCNC: 22 U/L — SIGNIFICANT CHANGE UP (ref 12–78)
ALT FLD-CCNC: 22 U/L — SIGNIFICANT CHANGE UP (ref 12–78)
ANION GAP SERPL CALC-SCNC: 8 MMOL/L — SIGNIFICANT CHANGE UP (ref 5–17)
ANION GAP SERPL CALC-SCNC: 8 MMOL/L — SIGNIFICANT CHANGE UP (ref 5–17)
AST SERPL-CCNC: 18 U/L — SIGNIFICANT CHANGE UP (ref 15–37)
AST SERPL-CCNC: 18 U/L — SIGNIFICANT CHANGE UP (ref 15–37)
BILIRUB SERPL-MCNC: 0.3 MG/DL — SIGNIFICANT CHANGE UP (ref 0.2–1.2)
BILIRUB SERPL-MCNC: 0.3 MG/DL — SIGNIFICANT CHANGE UP (ref 0.2–1.2)
BUN SERPL-MCNC: 15 MG/DL — SIGNIFICANT CHANGE UP (ref 7–23)
BUN SERPL-MCNC: 15 MG/DL — SIGNIFICANT CHANGE UP (ref 7–23)
CALCIUM SERPL-MCNC: 8.3 MG/DL — LOW (ref 8.5–10.1)
CALCIUM SERPL-MCNC: 8.3 MG/DL — LOW (ref 8.5–10.1)
CHLORIDE SERPL-SCNC: 109 MMOL/L — HIGH (ref 96–108)
CHLORIDE SERPL-SCNC: 109 MMOL/L — HIGH (ref 96–108)
CO2 SERPL-SCNC: 22 MMOL/L — SIGNIFICANT CHANGE UP (ref 22–31)
CO2 SERPL-SCNC: 22 MMOL/L — SIGNIFICANT CHANGE UP (ref 22–31)
CREAT SERPL-MCNC: 0.66 MG/DL — SIGNIFICANT CHANGE UP (ref 0.5–1.3)
CREAT SERPL-MCNC: 0.66 MG/DL — SIGNIFICANT CHANGE UP (ref 0.5–1.3)
EGFR: 105 ML/MIN/1.73M2 — SIGNIFICANT CHANGE UP
EGFR: 105 ML/MIN/1.73M2 — SIGNIFICANT CHANGE UP
GLUCOSE SERPL-MCNC: 161 MG/DL — HIGH (ref 70–99)
GLUCOSE SERPL-MCNC: 161 MG/DL — HIGH (ref 70–99)
HCG UR QL: NEGATIVE — SIGNIFICANT CHANGE UP
HCG UR QL: NEGATIVE — SIGNIFICANT CHANGE UP
POTASSIUM SERPL-MCNC: 3.9 MMOL/L — SIGNIFICANT CHANGE UP (ref 3.5–5.3)
POTASSIUM SERPL-MCNC: 3.9 MMOL/L — SIGNIFICANT CHANGE UP (ref 3.5–5.3)
POTASSIUM SERPL-SCNC: 3.9 MMOL/L — SIGNIFICANT CHANGE UP (ref 3.5–5.3)
POTASSIUM SERPL-SCNC: 3.9 MMOL/L — SIGNIFICANT CHANGE UP (ref 3.5–5.3)
PROT SERPL-MCNC: 5.7 GM/DL — LOW (ref 6–8.3)
PROT SERPL-MCNC: 5.7 GM/DL — LOW (ref 6–8.3)
SODIUM SERPL-SCNC: 139 MMOL/L — SIGNIFICANT CHANGE UP (ref 135–145)
SODIUM SERPL-SCNC: 139 MMOL/L — SIGNIFICANT CHANGE UP (ref 135–145)
SURGICAL PATHOLOGY STUDY: SIGNIFICANT CHANGE UP
SURGICAL PATHOLOGY STUDY: SIGNIFICANT CHANGE UP

## 2023-12-14 PROCEDURE — 19303 MAST SIMPLE COMPLETE: CPT | Mod: 50

## 2023-12-14 PROCEDURE — C1781: CPT

## 2023-12-14 PROCEDURE — 88305 TISSUE EXAM BY PATHOLOGIST: CPT | Mod: 26

## 2023-12-14 PROCEDURE — 80053 COMPREHEN METABOLIC PANEL: CPT

## 2023-12-14 PROCEDURE — 81025 URINE PREGNANCY TEST: CPT

## 2023-12-14 PROCEDURE — 85027 COMPLETE CBC AUTOMATED: CPT

## 2023-12-14 PROCEDURE — 36415 COLL VENOUS BLD VENIPUNCTURE: CPT

## 2023-12-14 PROCEDURE — 38745 REMOVE ARMPIT LYMPH NODES: CPT | Mod: RT

## 2023-12-14 PROCEDURE — 88307 TISSUE EXAM BY PATHOLOGIST: CPT

## 2023-12-14 PROCEDURE — 88333 PATH CONSLTJ SURG CYTO XM 1: CPT

## 2023-12-14 PROCEDURE — 88360 TUMOR IMMUNOHISTOCHEM/MANUAL: CPT

## 2023-12-14 PROCEDURE — 99024 POSTOP FOLLOW-UP VISIT: CPT

## 2023-12-14 PROCEDURE — A9520: CPT

## 2023-12-14 PROCEDURE — 38530 BIOPSY/REMOVAL LYMPH NODES: CPT | Mod: RT

## 2023-12-14 PROCEDURE — 88333 PATH CONSLTJ SURG CYTO XM 1: CPT | Mod: 26

## 2023-12-14 PROCEDURE — C9399: CPT

## 2023-12-14 PROCEDURE — 19303 MAST SIMPLE COMPLETE: CPT | Mod: AS,50

## 2023-12-14 PROCEDURE — 38530 BIOPSY/REMOVAL LYMPH NODES: CPT | Mod: LT

## 2023-12-14 PROCEDURE — 88360 TUMOR IMMUNOHISTOCHEM/MANUAL: CPT | Mod: 26

## 2023-12-14 PROCEDURE — C1889: CPT

## 2023-12-14 PROCEDURE — 88305 TISSUE EXAM BY PATHOLOGIST: CPT

## 2023-12-14 PROCEDURE — 80048 BASIC METABOLIC PNL TOTAL CA: CPT

## 2023-12-14 PROCEDURE — S2068: CPT | Mod: LT

## 2023-12-14 PROCEDURE — 38745 REMOVE ARMPIT LYMPH NODES: CPT | Mod: AS,RT

## 2023-12-14 PROCEDURE — S2068: CPT | Mod: RT

## 2023-12-14 PROCEDURE — 21600 PARTIAL REMOVAL OF RIB: CPT | Mod: 59,RT

## 2023-12-14 PROCEDURE — 88307 TISSUE EXAM BY PATHOLOGIST: CPT | Mod: 26

## 2023-12-14 PROCEDURE — 21600 PARTIAL REMOVAL OF RIB: CPT | Mod: LT,59

## 2023-12-14 RX ORDER — OXYCODONE HYDROCHLORIDE 5 MG/1
5 TABLET ORAL ONCE
Refills: 0 | Status: DISCONTINUED | OUTPATIENT
Start: 2023-12-14 | End: 2023-12-14

## 2023-12-14 RX ORDER — CEFAZOLIN SODIUM 1 G
2000 VIAL (EA) INJECTION EVERY 8 HOURS
Refills: 0 | Status: DISCONTINUED | OUTPATIENT
Start: 2023-12-14 | End: 2023-12-14

## 2023-12-14 RX ORDER — SENNA PLUS 8.6 MG/1
2 TABLET ORAL AT BEDTIME
Refills: 0 | Status: DISCONTINUED | OUTPATIENT
Start: 2023-12-14 | End: 2023-12-16

## 2023-12-14 RX ORDER — SODIUM CHLORIDE 9 MG/ML
1000 INJECTION, SOLUTION INTRAVENOUS
Refills: 0 | Status: DISCONTINUED | OUTPATIENT
Start: 2023-12-15 | End: 2023-12-16

## 2023-12-14 RX ORDER — ONDANSETRON 8 MG/1
4 TABLET, FILM COATED ORAL EVERY 6 HOURS
Refills: 0 | Status: DISCONTINUED | OUTPATIENT
Start: 2023-12-14 | End: 2023-12-16

## 2023-12-14 RX ORDER — FAMOTIDINE 10 MG/ML
20 INJECTION INTRAVENOUS
Refills: 0 | Status: DISCONTINUED | OUTPATIENT
Start: 2023-12-14 | End: 2023-12-16

## 2023-12-14 RX ORDER — KETOROLAC TROMETHAMINE 30 MG/ML
15 SYRINGE (ML) INJECTION EVERY 6 HOURS
Refills: 0 | Status: DISCONTINUED | OUTPATIENT
Start: 2023-12-14 | End: 2023-12-16

## 2023-12-14 RX ORDER — OXYCODONE HYDROCHLORIDE 5 MG/1
5 TABLET ORAL EVERY 4 HOURS
Refills: 0 | Status: DISCONTINUED | OUTPATIENT
Start: 2023-12-15 | End: 2023-12-16

## 2023-12-14 RX ORDER — FENTANYL CITRATE 50 UG/ML
50 INJECTION INTRAVENOUS
Refills: 0 | Status: DISCONTINUED | OUTPATIENT
Start: 2023-12-14 | End: 2023-12-14

## 2023-12-14 RX ORDER — ACETAMINOPHEN 500 MG
1000 TABLET ORAL EVERY 6 HOURS
Refills: 0 | Status: COMPLETED | OUTPATIENT
Start: 2023-12-15 | End: 2023-12-16

## 2023-12-14 RX ORDER — ENOXAPARIN SODIUM 100 MG/ML
40 INJECTION SUBCUTANEOUS EVERY 24 HOURS
Refills: 0 | Status: DISCONTINUED | OUTPATIENT
Start: 2023-12-15 | End: 2023-12-16

## 2023-12-14 RX ORDER — ACETAMINOPHEN 500 MG
1000 TABLET ORAL ONCE
Refills: 0 | Status: DISCONTINUED | OUTPATIENT
Start: 2023-12-14 | End: 2023-12-14

## 2023-12-14 RX ORDER — OXYCODONE HYDROCHLORIDE 5 MG/1
5 TABLET ORAL EVERY 6 HOURS
Refills: 0 | Status: DISCONTINUED | OUTPATIENT
Start: 2023-12-14 | End: 2023-12-15

## 2023-12-14 RX ORDER — ACETAMINOPHEN 500 MG
1000 TABLET ORAL EVERY 6 HOURS
Refills: 0 | Status: DISCONTINUED | OUTPATIENT
Start: 2023-12-14 | End: 2023-12-15

## 2023-12-14 RX ORDER — HYDROMORPHONE HYDROCHLORIDE 2 MG/ML
0.5 INJECTION INTRAMUSCULAR; INTRAVENOUS; SUBCUTANEOUS EVERY 4 HOURS
Refills: 0 | Status: DISCONTINUED | OUTPATIENT
Start: 2023-12-14 | End: 2023-12-15

## 2023-12-14 RX ORDER — ONDANSETRON 8 MG/1
4 TABLET, FILM COATED ORAL ONCE
Refills: 0 | Status: DISCONTINUED | OUTPATIENT
Start: 2023-12-14 | End: 2023-12-14

## 2023-12-14 RX ORDER — CEFAZOLIN SODIUM 1 G
2000 VIAL (EA) INJECTION EVERY 8 HOURS
Refills: 0 | Status: DISCONTINUED | OUTPATIENT
Start: 2023-12-14 | End: 2023-12-16

## 2023-12-14 RX ORDER — SODIUM CHLORIDE 9 MG/ML
1000 INJECTION, SOLUTION INTRAVENOUS
Refills: 0 | Status: DISCONTINUED | OUTPATIENT
Start: 2023-12-14 | End: 2023-12-14

## 2023-12-14 RX ORDER — OXYCODONE HYDROCHLORIDE 5 MG/1
10 TABLET ORAL EVERY 6 HOURS
Refills: 0 | Status: DISCONTINUED | OUTPATIENT
Start: 2023-12-14 | End: 2023-12-15

## 2023-12-14 RX ORDER — HYDROMORPHONE HYDROCHLORIDE 2 MG/ML
0.5 INJECTION INTRAMUSCULAR; INTRAVENOUS; SUBCUTANEOUS EVERY 4 HOURS
Refills: 0 | Status: DISCONTINUED | OUTPATIENT
Start: 2023-12-15 | End: 2023-12-16

## 2023-12-14 RX ADMIN — Medication 15 MILLIGRAM(S): at 22:00

## 2023-12-14 RX ADMIN — FAMOTIDINE 20 MILLIGRAM(S): 10 INJECTION INTRAVENOUS at 22:09

## 2023-12-14 NOTE — BRIEF OPERATIVE NOTE - OPERATION/FINDINGS
left breast 706 grams, right breast 801 grams  right axillary sentinel lymph node-positive on intraoperative pathologic review  Magtrace signal noted in left axilla using Sentimag probe
Immediate bilateral breast reconstruction with deep inferior epigastric  free flap, bilateral internal mammary lymph node excision, bilateral partial rib resection, bilateral transabdominis plane blocks

## 2023-12-14 NOTE — BRIEF OPERATIVE NOTE - NSICDXBRIEFPROCEDURE_GEN_ALL_CORE_FT
PROCEDURES:  Breast reconstruction with JENNIFFER free flap 14-Dec-2023 09:10:02  Lia Lauren  Partial rib resection 14-Dec-2023 09:10:12  Lia Lauren  Open biopsy, internal mammary lymph node 14-Dec-2023 09:10:18  Lia Lauren  
PROCEDURES:  Bilateral total mastectomy 14-Dec-2023 13:57:40  Yann Hughes  Right axillary lymph node dissection 14-Dec-2023 13:58:01  Yann Hughes

## 2023-12-14 NOTE — BRIEF OPERATIVE NOTE - SPECIMENS
right and left internal mammary lymph nodes
left breast, right breast, right sentinel lymph nodes x3, right axillary contents

## 2023-12-14 NOTE — BRIEF OPERATIVE NOTE - NSICDXBRIEFPREOP_GEN_ALL_CORE_FT
PRE-OP DIAGNOSIS:  Breast cancer, right 14-Dec-2023 09:10:34  Lia Lauren  
PRE-OP DIAGNOSIS:  Breast cancer, right 14-Dec-2023 09:10:34 metastatic to right axillary lymph node Lia Lauren

## 2023-12-14 NOTE — BRIEF OPERATIVE NOTE - NSICDXBRIEFPOSTOP_GEN_ALL_CORE_FT
POST-OP DIAGNOSIS:  Breast cancer, right 14-Dec-2023 13:59:34 metastatic to right axillary lymph node Yann Hughes

## 2023-12-14 NOTE — ASU PATIENT PROFILE, ADULT - FALL HARM RISK - UNIVERSAL INTERVENTIONS
Bed in lowest position, wheels locked, appropriate side rails in place/Call bell, personal items and telephone in reach/Instruct patient to call for assistance before getting out of bed or chair/Non-slip footwear when patient is out of bed/[place] to call system/Physically safe environment - no spills, clutter or unnecessary equipment/Purposeful Proactive Rounding/Room/bathroom lighting operational, light cord in reach Bed in lowest position, wheels locked, appropriate side rails in place/Call bell, personal items and telephone in reach/Instruct patient to call for assistance before getting out of bed or chair/Non-slip footwear when patient is out of bed/[place] to call system/Physically safe environment - no spills, clutter or unnecessary equipment/Purposeful Proactive Rounding/Room/bathroom lighting operational, light cord in reach

## 2023-12-14 NOTE — BRIEF OPERATIVE NOTE - NSICDXBRIEFPOSTOP_GEN_ALL_CORE_FT
POST-OP DIAGNOSIS:  Breast cancer, right 14-Dec-2023 09:10:42  iLa Lauren   POST-OP DIAGNOSIS:  Breast cancer, right 14-Dec-2023 09:10:42  Lia Lauren

## 2023-12-14 NOTE — PROGRESS NOTE ADULT - ASSESSMENT
a/p   53y Female with PMhx of right breast cancer now POD#0 s/p b/l total mastectomy. JENNIFFER, partial rib resection, open biopsy    Continue cook doppler monitoring   Breast flaps appear viable  ADAT  Strict I&O's  Pain control  DVT prophylaxis/OOB  post-op CMP wnl, Cr 0.6  Encourage Incentive spirometry  f/u AM labs     d/w Dr. vEans  Plastic Surgery  Janis Kinney PA-C   a/p   53y Female with PMhx of right breast cancer now POD#0 s/p b/l total mastectomy. JENNIFFER, partial rib resection, open biopsy    Continue cook doppler monitoring   Breast flaps appear viable  ADAT  Strict I&O's  Pain control  DVT prophylaxis/OOB  post-op CMP wnl, Cr 0.6  Encourage Incentive spirometry  f/u AM labs     d/w Dr. Evans  Plastic Surgery  Janis Kinney PA-C

## 2023-12-14 NOTE — PROGRESS NOTE ADULT - SUBJECTIVE AND OBJECTIVE BOX
53y Female with PMhx of right breast cancer now POD#0 s/p b/l total mastectomy. JENNIFFER, partial rib resection, open biopsy  Pain controlled. NPO, IVF, vieira in place  Denies CP, palpitation, SOB, N/V/abdominal pain    acetaminophen   IVPB .. 1000 milliGRAM(s) IV Intermittent once  ceFAZolin  Injectable. 2000 milliGRAM(s) IV Push every 8 hours  famotidine Injectable 20 milliGRAM(s) IV Push two times a day  fentaNYL    Injectable 50 MICROGram(s) IV Push every 5 minutes PRN  lactated ringers. 1000 milliLiter(s) IV Continuous <Continuous>  ondansetron Injectable 4 milliGRAM(s) IV Push once PRN  ondansetron Injectable 4 milliGRAM(s) IV Push every 6 hours PRN  oxyCODONE    IR 5 milliGRAM(s) Oral once PRN  senna 2 Tablet(s) Oral at bedtime      Vital Signs Last 24 Hrs  T(C): 36.7 (14 Dec 2023 18:05), Max: 36.7 (14 Dec 2023 18:05)  T(F): 98.1 (14 Dec 2023 18:05), Max: 98.1 (14 Dec 2023 18:05)  HR: 91 (14 Dec 2023 19:45) (81 - 91)  BP: 105/60 (14 Dec 2023 19:45) (94/51 - 139/87)  BP(mean): --  RR: 14 (14 Dec 2023 19:45) (11 - 17)  SpO2: 100% (14 Dec 2023 19:45) (97% - 100%)    Parameters below as of 14 Dec 2023 18:05  Patient On (Oxygen Delivery Method): nasal cannula  O2 Flow (L/min): 2      I&O's Detail    14 Dec 2023 07:01  -  14 Dec 2023 19:52  --------------------------------------------------------  IN:    Other (mL): 3500 mL  Total IN: 3500 mL    OUT:    Bulb (mL): 12 mL    Bulb (mL): 7 mL    Bulb (mL): 10 mL    Bulb (mL): 9 mL    Bulb (mL): 17 mL    Indwelling Catheter - Urethral (mL): 100 mL    Other (mL): 800 mL  Total OUT: 955 mL    Total NET: 2545 mL          Physical Exam  Gen: NAD, comfortable in bed  Neuro: A&Ox3  Breasts: Flaps intact, good color, capillary refill bl. No swelling or hematomas appreciated. JOVANNY x 4. Emay Softcom doppler in situ functioning well.   Lungs: CTAB  CV: S1/S2, RRR  Abd: Soft, ND, lower abdominal incision c/d/i.  +abdominal JPs x 2in place   Extremities: Venodynes in place. No LE edema bl        12-14    139  |  109<H>  |  15  ----------------------------<  161<H>  3.9   |  22  |  0.66    Ca    8.3<L>      14 Dec 2023 18:20    TPro  5.7<L>  /  Alb  2.8<L>  /  TBili  0.3  /  DBili  x   /  AST  18  /  ALT  22  /  AlkPhos  46  12-14       53y Female with PMhx of right breast cancer now POD#0 s/p b/l total mastectomy. JENNIFFER, partial rib resection, open biopsy  Pain controlled. NPO, IVF, vieira in place  Denies CP, palpitation, SOB, N/V/abdominal pain    acetaminophen   IVPB .. 1000 milliGRAM(s) IV Intermittent once  ceFAZolin  Injectable. 2000 milliGRAM(s) IV Push every 8 hours  famotidine Injectable 20 milliGRAM(s) IV Push two times a day  fentaNYL    Injectable 50 MICROGram(s) IV Push every 5 minutes PRN  lactated ringers. 1000 milliLiter(s) IV Continuous <Continuous>  ondansetron Injectable 4 milliGRAM(s) IV Push once PRN  ondansetron Injectable 4 milliGRAM(s) IV Push every 6 hours PRN  oxyCODONE    IR 5 milliGRAM(s) Oral once PRN  senna 2 Tablet(s) Oral at bedtime      Vital Signs Last 24 Hrs  T(C): 36.7 (14 Dec 2023 18:05), Max: 36.7 (14 Dec 2023 18:05)  T(F): 98.1 (14 Dec 2023 18:05), Max: 98.1 (14 Dec 2023 18:05)  HR: 91 (14 Dec 2023 19:45) (81 - 91)  BP: 105/60 (14 Dec 2023 19:45) (94/51 - 139/87)  BP(mean): --  RR: 14 (14 Dec 2023 19:45) (11 - 17)  SpO2: 100% (14 Dec 2023 19:45) (97% - 100%)    Parameters below as of 14 Dec 2023 18:05  Patient On (Oxygen Delivery Method): nasal cannula  O2 Flow (L/min): 2      I&O's Detail    14 Dec 2023 07:01  -  14 Dec 2023 19:52  --------------------------------------------------------  IN:    Other (mL): 3500 mL  Total IN: 3500 mL    OUT:    Bulb (mL): 12 mL    Bulb (mL): 7 mL    Bulb (mL): 10 mL    Bulb (mL): 9 mL    Bulb (mL): 17 mL    Indwelling Catheter - Urethral (mL): 100 mL    Other (mL): 800 mL  Total OUT: 955 mL    Total NET: 2545 mL          Physical Exam  Gen: NAD, comfortable in bed  Neuro: A&Ox3  Breasts: Flaps intact, good color, capillary refill bl. No swelling or hematomas appreciated. JOVANNY x 4. Providajob doppler in situ functioning well.   Lungs: CTAB  CV: S1/S2, RRR  Abd: Soft, ND, lower abdominal incision c/d/i.  +abdominal JPs x 2in place   Extremities: Venodynes in place. No LE edema bl        12-14    139  |  109<H>  |  15  ----------------------------<  161<H>  3.9   |  22  |  0.66    Ca    8.3<L>      14 Dec 2023 18:20    TPro  5.7<L>  /  Alb  2.8<L>  /  TBili  0.3  /  DBili  x   /  AST  18  /  ALT  22  /  AlkPhos  46  12-14

## 2023-12-15 DIAGNOSIS — C50.911 MALIGNANT NEOPLASM OF UNSPECIFIED SITE OF RIGHT FEMALE BREAST: ICD-10-CM

## 2023-12-15 LAB
ANION GAP SERPL CALC-SCNC: 4 MMOL/L — LOW (ref 5–17)
ANION GAP SERPL CALC-SCNC: 4 MMOL/L — LOW (ref 5–17)
BUN SERPL-MCNC: 12 MG/DL — SIGNIFICANT CHANGE UP (ref 7–23)
BUN SERPL-MCNC: 12 MG/DL — SIGNIFICANT CHANGE UP (ref 7–23)
CALCIUM SERPL-MCNC: 8.6 MG/DL — SIGNIFICANT CHANGE UP (ref 8.5–10.1)
CALCIUM SERPL-MCNC: 8.6 MG/DL — SIGNIFICANT CHANGE UP (ref 8.5–10.1)
CHLORIDE SERPL-SCNC: 112 MMOL/L — HIGH (ref 96–108)
CHLORIDE SERPL-SCNC: 112 MMOL/L — HIGH (ref 96–108)
CO2 SERPL-SCNC: 28 MMOL/L — SIGNIFICANT CHANGE UP (ref 22–31)
CO2 SERPL-SCNC: 28 MMOL/L — SIGNIFICANT CHANGE UP (ref 22–31)
CREAT SERPL-MCNC: 0.52 MG/DL — SIGNIFICANT CHANGE UP (ref 0.5–1.3)
CREAT SERPL-MCNC: 0.52 MG/DL — SIGNIFICANT CHANGE UP (ref 0.5–1.3)
EGFR: 112 ML/MIN/1.73M2 — SIGNIFICANT CHANGE UP
EGFR: 112 ML/MIN/1.73M2 — SIGNIFICANT CHANGE UP
GLUCOSE SERPL-MCNC: 127 MG/DL — HIGH (ref 70–99)
GLUCOSE SERPL-MCNC: 127 MG/DL — HIGH (ref 70–99)
HCT VFR BLD CALC: 31.6 % — LOW (ref 34.5–45)
HCT VFR BLD CALC: 31.6 % — LOW (ref 34.5–45)
HGB BLD-MCNC: 10.4 G/DL — LOW (ref 11.5–15.5)
HGB BLD-MCNC: 10.4 G/DL — LOW (ref 11.5–15.5)
MCHC RBC-ENTMCNC: 29.5 PG — SIGNIFICANT CHANGE UP (ref 27–34)
MCHC RBC-ENTMCNC: 29.5 PG — SIGNIFICANT CHANGE UP (ref 27–34)
MCHC RBC-ENTMCNC: 32.9 GM/DL — SIGNIFICANT CHANGE UP (ref 32–36)
MCHC RBC-ENTMCNC: 32.9 GM/DL — SIGNIFICANT CHANGE UP (ref 32–36)
MCV RBC AUTO: 89.5 FL — SIGNIFICANT CHANGE UP (ref 80–100)
MCV RBC AUTO: 89.5 FL — SIGNIFICANT CHANGE UP (ref 80–100)
PLATELET # BLD AUTO: 193 K/UL — SIGNIFICANT CHANGE UP (ref 150–400)
PLATELET # BLD AUTO: 193 K/UL — SIGNIFICANT CHANGE UP (ref 150–400)
POTASSIUM SERPL-MCNC: 3.6 MMOL/L — SIGNIFICANT CHANGE UP (ref 3.5–5.3)
POTASSIUM SERPL-MCNC: 3.6 MMOL/L — SIGNIFICANT CHANGE UP (ref 3.5–5.3)
POTASSIUM SERPL-SCNC: 3.6 MMOL/L — SIGNIFICANT CHANGE UP (ref 3.5–5.3)
POTASSIUM SERPL-SCNC: 3.6 MMOL/L — SIGNIFICANT CHANGE UP (ref 3.5–5.3)
RBC # BLD: 3.53 M/UL — LOW (ref 3.8–5.2)
RBC # BLD: 3.53 M/UL — LOW (ref 3.8–5.2)
RBC # FLD: 13 % — SIGNIFICANT CHANGE UP (ref 10.3–14.5)
RBC # FLD: 13 % — SIGNIFICANT CHANGE UP (ref 10.3–14.5)
SODIUM SERPL-SCNC: 144 MMOL/L — SIGNIFICANT CHANGE UP (ref 135–145)
SODIUM SERPL-SCNC: 144 MMOL/L — SIGNIFICANT CHANGE UP (ref 135–145)
WBC # BLD: 12.05 K/UL — HIGH (ref 3.8–10.5)
WBC # BLD: 12.05 K/UL — HIGH (ref 3.8–10.5)
WBC # FLD AUTO: 12.05 K/UL — HIGH (ref 3.8–10.5)
WBC # FLD AUTO: 12.05 K/UL — HIGH (ref 3.8–10.5)

## 2023-12-15 RX ORDER — ACETAMINOPHEN 500 MG
975 TABLET ORAL EVERY 6 HOURS
Refills: 0 | Status: DISCONTINUED | OUTPATIENT
Start: 2023-12-15 | End: 2023-12-16

## 2023-12-15 RX ADMIN — FAMOTIDINE 20 MILLIGRAM(S): 10 INJECTION INTRAVENOUS at 12:11

## 2023-12-15 RX ADMIN — Medication 1000 MILLIGRAM(S): at 13:12

## 2023-12-15 RX ADMIN — SODIUM CHLORIDE 75 MILLILITER(S): 9 INJECTION, SOLUTION INTRAVENOUS at 12:12

## 2023-12-15 RX ADMIN — Medication 15 MILLIGRAM(S): at 23:17

## 2023-12-15 RX ADMIN — ENOXAPARIN SODIUM 40 MILLIGRAM(S): 100 INJECTION SUBCUTANEOUS at 06:37

## 2023-12-15 RX ADMIN — Medication 15 MILLIGRAM(S): at 12:00

## 2023-12-15 RX ADMIN — Medication 2000 MILLIGRAM(S): at 13:59

## 2023-12-15 RX ADMIN — Medication 400 MILLIGRAM(S): at 18:16

## 2023-12-15 RX ADMIN — Medication 2000 MILLIGRAM(S): at 01:14

## 2023-12-15 RX ADMIN — Medication 400 MILLIGRAM(S): at 12:12

## 2023-12-15 RX ADMIN — Medication 15 MILLIGRAM(S): at 16:58

## 2023-12-15 RX ADMIN — SENNA PLUS 2 TABLET(S): 8.6 TABLET ORAL at 22:09

## 2023-12-15 RX ADMIN — Medication 400 MILLIGRAM(S): at 00:16

## 2023-12-15 RX ADMIN — Medication 400 MILLIGRAM(S): at 06:37

## 2023-12-15 RX ADMIN — Medication 2000 MILLIGRAM(S): at 22:09

## 2023-12-15 RX ADMIN — Medication 2000 MILLIGRAM(S): at 08:16

## 2023-12-15 RX ADMIN — Medication 15 MILLIGRAM(S): at 11:01

## 2023-12-15 RX ADMIN — Medication 15 MILLIGRAM(S): at 23:47

## 2023-12-15 RX ADMIN — Medication 15 MILLIGRAM(S): at 04:58

## 2023-12-15 NOTE — PROGRESS NOTE ADULT - SUBJECTIVE AND OBJECTIVE BOX
S:  Alert, comfortable.  Sitting in chair.    O:  T m 99, VSS       Breasts soft, Skin viable.       Abdominal incision clean.       Drains serosang.

## 2023-12-15 NOTE — PROGRESS NOTE ADULT - SUBJECTIVE AND OBJECTIVE BOX
Patient stable overnight. Pain controlled. In good spirits. Eager to get out of bed.  vss/a tm 99 u/o adequate, jps mod serosang output  flaps viable with good signals and color  mastectomy skin viable bilaterally  abdomen and umbilicus CDI    Doing well  -Reviewed: ROM, activity restrictions, sleeping position, bathing, JPs  -Incentive spirometry  -VTE prophylaxis  -Regular diet  -Girard out  -OOB to chair then ambulate  -follow flap exam and doppler

## 2023-12-16 ENCOUNTER — TRANSCRIPTION ENCOUNTER (OUTPATIENT)
Age: 53
End: 2023-12-16

## 2023-12-16 VITALS
RESPIRATION RATE: 18 BRPM | HEART RATE: 90 BPM | OXYGEN SATURATION: 97 % | DIASTOLIC BLOOD PRESSURE: 85 MMHG | SYSTOLIC BLOOD PRESSURE: 134 MMHG | TEMPERATURE: 99 F

## 2023-12-16 RX ORDER — OXYCODONE HYDROCHLORIDE 5 MG/1
1 TABLET ORAL
Qty: 0 | Refills: 0 | DISCHARGE
Start: 2023-12-16

## 2023-12-16 RX ORDER — SENNA PLUS 8.6 MG/1
2 TABLET ORAL
Qty: 0 | Refills: 0 | DISCHARGE
Start: 2023-12-16

## 2023-12-16 RX ORDER — ACETAMINOPHEN 500 MG
3 TABLET ORAL
Qty: 0 | Refills: 0 | DISCHARGE
Start: 2023-12-16

## 2023-12-16 RX ADMIN — SODIUM CHLORIDE 75 MILLILITER(S): 9 INJECTION, SOLUTION INTRAVENOUS at 00:56

## 2023-12-16 RX ADMIN — Medication 1000 MILLIGRAM(S): at 01:26

## 2023-12-16 RX ADMIN — Medication 2000 MILLIGRAM(S): at 06:14

## 2023-12-16 RX ADMIN — Medication 975 MILLIGRAM(S): at 07:27

## 2023-12-16 RX ADMIN — ENOXAPARIN SODIUM 40 MILLIGRAM(S): 100 INJECTION SUBCUTANEOUS at 06:14

## 2023-12-16 RX ADMIN — Medication 15 MILLIGRAM(S): at 06:14

## 2023-12-16 RX ADMIN — Medication 15 MILLIGRAM(S): at 06:44

## 2023-12-16 RX ADMIN — Medication 975 MILLIGRAM(S): at 06:57

## 2023-12-16 RX ADMIN — Medication 400 MILLIGRAM(S): at 00:56

## 2023-12-16 NOTE — DISCHARGE NOTE PROVIDER - HOSPITAL COURSE
Bilaterla mastectomies, right axillary dissection, JENNIFFER reconstruction 12/14/2023.  Postoperative course unremarkable.

## 2023-12-16 NOTE — PROGRESS NOTE ADULT - SUBJECTIVE AND OBJECTIVE BOX
Patient feels great. OOB and ambulated. Eating, voiding and pain controlled well.  vss/a jps mod serosang  flaps viable  mastectomy skin viable  abdomen and umbilicus cdi    Doing well. Doppler wires cut and dressed.  -reviewed d/c instructions: no heavy lifting, ROM, activity restrictions, bathing, sleeping, no hot or cold packs, f/u, drain care, and meds.  -patient may go home.

## 2023-12-16 NOTE — DISCHARGE NOTE PROVIDER - NSDCMRMEDTOKEN_GEN_ALL_CORE_FT
acetaminophen 325 mg oral tablet: 3 tab(s) orally every 6 hours  oxyCODONE 5 mg oral tablet: 1 tab(s) orally every 4 hours As needed Moderate Pain (4 - 6)  senna leaf extract oral tablet: 2 tab(s) orally once a day (at bedtime)

## 2023-12-16 NOTE — DISCHARGE NOTE NURSING/CASE MANAGEMENT/SOCIAL WORK - PATIENT PORTAL LINK FT
You can access the FollowMyHealth Patient Portal offered by [facility] by registering at the following website: [email or web address]. By joining [employer]’s FollowMyHealth portal, you will also be able to view your health information using other applications (apps) compatible with our system. You can access the FollowMyHealth Patient Portal offered by [facility] by registering at the following website: [email or web address]. By joining [employer]’s FollowMyHealth portal, you will also be able to view your health information using other applications (apps) compatible with our system.

## 2023-12-16 NOTE — DISCHARGE NOTE PROVIDER - CARE PROVIDERS DIRECT ADDRESSES
,[email or web address]

## 2023-12-16 NOTE — PROGRESS NOTE ADULT - SUBJECTIVE AND OBJECTIVE BOX
S:  Alert, comfortable  Ambulated.    O:  Afebrile, VSS       Breasts soft.  Skin viable.  Skin islands pink with good doppler.       Abdomen soft.  Incision clean.      Drains serous.

## 2023-12-16 NOTE — PROGRESS NOTE ADULT - REASON FOR ADMISSION
Bilateral mastectomies/JENNIFFER
bilateral mastectomies, right axillary dissection, internal LN biopsies, bilateral JENNIFFER flaps for Right breast cancer.
Bilateral mastectomies/JENNIFFER
bilateral mastectomies and yelena flaps

## 2023-12-16 NOTE — DISCHARGE NOTE NURSING/CASE MANAGEMENT/SOCIAL WORK - NSDCPEFALRISK_GEN_ALL_CORE
For information on Fall & Injury Prevention, visit: [email or web address] OR  [email or web address] OR  https://www.cdc.gov/steadi/patient.html For information on Fall & Injury Prevention, visit: [email or web address] OR  [email or web address] OR  https://www.cdc.gov/steadi/patient.html

## 2023-12-16 NOTE — DISCHARGE NOTE PROVIDER - CARE PROVIDER_API CALL
Joss Evans  Plastic Surgery  [address] [place] [address]  Phone: [phone]  Fax: [phone]  Follow Up Time:     Naomie Mallory  Surgery  [address] [facility] [address] [place] [address]  Phone: [phone]  Fax: [phone]  Follow Up Time:    Joss Evans  Plastic Surgery  [address] [place] [address]  Phone: [phone]  Fax: [phone]  Follow Up Time:     Naomie Mallory  Surgery  [address], [place] [address]  Phone: [phone]  Fax: [phone]  Follow Up Time:

## 2023-12-16 NOTE — DISCHARGE NOTE PROVIDER - PROVIDER TOKENS
PROVIDER:[TOKEN:[[id number]]],PROVIDER:[TOKEN:[[id number]]] PROVIDER:[TOKEN:[[id number]]],PROVIDER:[TOKEN:[[id number]]]

## 2023-12-16 NOTE — DISCHARGE NOTE PROVIDER - NSDCFUSCHEDAPPT_GEN_ALL_CORE_FT
Joss Evans  [facility] Physician Formerly [facility]  PLASTICSUR [address]  Scheduled Appointment: 12/20/2023    Naomie Mallory  [facility] Physician [facility]  BREAST [address]  Scheduled Appointment: 12/20/2023     Joss Evans  [facility] Physician Formerly [facility]  [address]ASTICSUR [address]  Scheduled Appointment: 12/20/2023    Naomie Mallory  [facility] Physician [facility]  BREAST [address]  Scheduled Appointment: 12/20/2023

## 2023-12-18 ENCOUNTER — NON-APPOINTMENT (OUTPATIENT)
Age: 53
End: 2023-12-18

## 2023-12-19 NOTE — CONSULT LETTER
[Dear  ___] : Dear  [unfilled], [Consult Letter:] : I had the pleasure of evaluating your patient, [unfilled]. [Sincerely,] : Sincerely, [DrPhoebe  ___] : Dr. GALLAGHER [DrPhoebe ___] : Dr. GALLAGHER

## 2023-12-20 ENCOUNTER — APPOINTMENT (OUTPATIENT)
Dept: PLASTIC SURGERY | Facility: CLINIC | Age: 53
End: 2023-12-20
Payer: COMMERCIAL

## 2023-12-20 ENCOUNTER — APPOINTMENT (OUTPATIENT)
Dept: BREAST CENTER | Facility: CLINIC | Age: 53
End: 2023-12-20
Payer: COMMERCIAL

## 2023-12-20 VITALS
SYSTOLIC BLOOD PRESSURE: 119 MMHG | HEIGHT: 65 IN | HEART RATE: 80 BPM | DIASTOLIC BLOOD PRESSURE: 77 MMHG | OXYGEN SATURATION: 98 % | WEIGHT: 185 LBS | BODY MASS INDEX: 30.82 KG/M2 | TEMPERATURE: 98 F

## 2023-12-20 VITALS
WEIGHT: 185 LBS | DIASTOLIC BLOOD PRESSURE: 73 MMHG | HEIGHT: 65 IN | BODY MASS INDEX: 30.82 KG/M2 | SYSTOLIC BLOOD PRESSURE: 119 MMHG | HEART RATE: 83 BPM

## 2023-12-20 DIAGNOSIS — Z09 ENCOUNTER FOR FOLLOW-UP EXAMINATION AFTER COMPLETED TREATMENT FOR CONDITIONS OTHER THAN MALIGNANT NEOPLASM: ICD-10-CM

## 2023-12-20 LAB
SURGICAL PATHOLOGY STUDY: SIGNIFICANT CHANGE UP
SURGICAL PATHOLOGY STUDY: SIGNIFICANT CHANGE UP

## 2023-12-20 PROCEDURE — 99024 POSTOP FOLLOW-UP VISIT: CPT

## 2023-12-20 RX ORDER — ACETAMINOPHEN 500 MG/1
500 TABLET ORAL
Qty: 40 | Refills: 0 | Status: DISCONTINUED | COMMUNITY
Start: 2023-12-12 | End: 2023-12-20

## 2023-12-20 RX ORDER — KETOROLAC TROMETHAMINE 10 MG/1
10 TABLET, FILM COATED ORAL EVERY 6 HOURS
Qty: 20 | Refills: 0 | Status: DISCONTINUED | COMMUNITY
Start: 2023-12-12 | End: 2023-12-20

## 2023-12-20 RX ORDER — OXYCODONE 5 MG/1
5 TABLET ORAL
Qty: 15 | Refills: 0 | Status: DISCONTINUED | COMMUNITY
Start: 2023-12-12 | End: 2023-12-20

## 2023-12-20 RX ORDER — CEFADROXIL 500 MG/1
500 CAPSULE ORAL TWICE DAILY
Qty: 4 | Refills: 0 | Status: DISCONTINUED | COMMUNITY
Start: 2023-12-12 | End: 2023-12-20

## 2023-12-20 RX ORDER — ONDANSETRON 4 MG/1
4 TABLET, ORALLY DISINTEGRATING ORAL EVERY 6 HOURS
Qty: 30 | Refills: 0 | Status: DISCONTINUED | COMMUNITY
Start: 2023-12-12 | End: 2023-12-20

## 2023-12-20 NOTE — PHYSICAL EXAM
[de-identified] : bilateral flaps and breast skin healing well. No collections, erythema or wound issues. Good symmetry. [de-identified] : umbilicus viable and incisions healing well.

## 2023-12-20 NOTE — PHYSICAL EXAM
[de-identified] : Breast skin viable. Circular skin island viable. [de-identified] : Breast skin viable.  Circular skin island viable. [de-identified] : Incision clean, healing well.

## 2023-12-20 NOTE — HISTORY OF PRESENT ILLNESS
[FreeTextEntry1] : Patient s/p bilateral mastectomies and right axillary dissection, no LYMPHA, bilateral JENNIFFER flaps. She is here for postop check. No complaints.

## 2023-12-20 NOTE — DATA REVIEWED
[FreeTextEntry1] : Bilateral mammogram 7/13/2022:  Mammogram negative Bilateral breast ultrasound 7/13/2022:  Right irregular slightly hypoechoic 9x9x12 mm nodule 10N8, rec biopsy  Bilateral mammogram 6/28/2023:  Right upper outer breast N7 3 cm spiculated mass with heterogeneous calcifications.  Architectural distortion extending in linear fashion to 0.5 cm of nipple suspicious for ductal extension of disease.  Right upper posterior N12 1 cm grouping of heterogeneous calcifications, suspicious.  Bilateral breast ultrasound 6/28/2023:  Right 10 N8 [id number] mm irregular hypoechoic mass.  Numerous additional findings in right breast: 10N9 74x37p9 mm                                                       periareolar 10  66z53t67 mm subtle shadowing and arch distortion                                                       10N12 10x8x8 mm shadowing with calcifications                                                       12N1 73u5z20 mm subtle hypoechoic nodularity                                                       12N4 7x4x12 mm focal hypoechoic nodularity                                                       11N5 6x5x7 mm focal hypoechoic nodularity                                                        8and 9 periareolar hypoechoic tubular densities extending for 3 cm                                                        Prominet lymph node 25m44g59 mm with cortical thickening    Bilateral breast MRI 6/23/2023:  Right- confluent heterogeneous enhancing masses 69x39y96 mm upper outer mid depth 10-11:00.  Multi regional clumped and heterogeneous non mass enhancement throughout the breast extending to the NAC c/w multifocal/multicentric malignancy.  Left no suspicious masses. Abnormal right axillary node 2 cm c/w metastatic disease.  Pathology 6/28/2023  Right breast axilla (Q clip)= metastatic carcinoma in lymph node, ER >90% ID 80%, Her 2 1-2+ FISH nonamplified, Ki 67 20%                                   Right breast 10:00N10 (Ribbon clip)= infiltrating ductal carcinoma, mod diff with DCIS, ER>90% ID>90% Her 2 1+, Ki 67 15%                                   Right breast 10:00 (RA)(Hourglass clip)= benign breast tissue with fibroadenomatoid change                                  Right breast 10N8 (coil clip)= infiltrating ductal carcinoma, mod diff, ER >90% ID 90% Her 2 2+ FISH amplified  A post-biopsy mammogram was not done.                    PET/CT 7/5/2023:  FDG avid right breast and axillary node mets. Right axillary node 26x16 mm and additional node more medial 7x6 mm,  No distant disease.

## 2023-12-20 NOTE — PAST MEDICAL HISTORY
[Menarche Age ____] : age at menarche was [unfilled] [Menopause Age____] : age at menopause was [unfilled] [Total Preg ___] : G[unfilled] [Live Births ___] : P[unfilled]  [Age At Live Birth ___] : Age at live birth: [unfilled] [de-identified] : [id number] [FreeTextEntry7] : no [FreeTextEntry8] : 5months

## 2023-12-22 DIAGNOSIS — G47.33 OBSTRUCTIVE SLEEP APNEA (ADULT) (PEDIATRIC): ICD-10-CM

## 2023-12-22 DIAGNOSIS — C50.911 MALIGNANT NEOPLASM OF UNSPECIFIED SITE OF RIGHT FEMALE BREAST: ICD-10-CM

## 2023-12-22 DIAGNOSIS — C77.3 SECONDARY AND UNSPECIFIED MALIGNANT NEOPLASM OF AXILLA AND UPPER LIMB LYMPH NODES: ICD-10-CM

## 2023-12-26 ENCOUNTER — APPOINTMENT (OUTPATIENT)
Dept: PLASTIC SURGERY | Facility: CLINIC | Age: 53
End: 2023-12-26
Payer: COMMERCIAL

## 2023-12-26 VITALS
OXYGEN SATURATION: 99 % | HEIGHT: 65 IN | BODY MASS INDEX: 30.82 KG/M2 | TEMPERATURE: 98 F | HEART RATE: 77 BPM | SYSTOLIC BLOOD PRESSURE: 119 MMHG | WEIGHT: 185 LBS | DIASTOLIC BLOOD PRESSURE: 84 MMHG

## 2023-12-26 PROCEDURE — 99024 POSTOP FOLLOW-UP VISIT: CPT

## 2023-12-26 NOTE — PHYSICAL EXAM
[de-identified] : b/l breast flaps soft, nt. viable. incisions c/d/i. good volume and symmetry. no erythema/warmth/dc. no collection. left axilalry JOVANNY drain held to suction with minimal SS output. JOVANNY drain dc'ed and dressing placed with bacitracin + gauze dressing. [de-identified] : abdomen soft, nt. incisions c/d/i and healing well. umbilicus viable. no erythema/warmth/dc. no collection. right abdominal JOVANNY drain held to suction with minimal SS output. JOVANNY drain dc'ed and dressing placed with bacitracin + gauze dressing. [de-identified] : good overall ROM, although some stiffness with some movement. reach for recovery reviewed again.

## 2023-12-26 NOTE — HISTORY OF PRESENT ILLNESS
[FreeTextEntry1] : Patient s/p bilateral mastectomies and right axillary dissection, no LYMPHA, bilateral JENNIFFER flaps. She presents with her  for a routine post operative visit. JOVANNY drains outputs reviewed. She is without any complaints today.

## 2024-01-04 ENCOUNTER — OUTPATIENT (OUTPATIENT)
Dept: OUTPATIENT SERVICES | Facility: HOSPITAL | Age: 54
LOS: 1 days | Discharge: HOME | End: 2024-01-04

## 2024-01-04 DIAGNOSIS — C50.919 MALIGNANT NEOPLASM OF UNSPECIFIED SITE OF UNSPECIFIED FEMALE BREAST: ICD-10-CM

## 2024-01-04 DIAGNOSIS — Z98.890 OTHER SPECIFIED POSTPROCEDURAL STATES: Chronic | ICD-10-CM

## 2024-01-05 ENCOUNTER — APPOINTMENT (OUTPATIENT)
Dept: PLASTIC SURGERY | Facility: CLINIC | Age: 54
End: 2024-01-05
Payer: COMMERCIAL

## 2024-01-05 ENCOUNTER — APPOINTMENT (OUTPATIENT)
Dept: HEMATOLOGY ONCOLOGY | Facility: CLINIC | Age: 54
End: 2024-01-05
Payer: COMMERCIAL

## 2024-01-05 VITALS
OXYGEN SATURATION: 98 % | SYSTOLIC BLOOD PRESSURE: 116 MMHG | TEMPERATURE: 97.7 F | HEART RATE: 77 BPM | DIASTOLIC BLOOD PRESSURE: 79 MMHG | WEIGHT: 180 LBS | HEIGHT: 65 IN | BODY MASS INDEX: 29.99 KG/M2

## 2024-01-05 PROCEDURE — 99443: CPT

## 2024-01-05 PROCEDURE — 99024 POSTOP FOLLOW-UP VISIT: CPT

## 2024-01-05 NOTE — HISTORY OF PRESENT ILLNESS
[de-identified] : The patient reports she "became more holistic over time.  I was not vaccinated for COVID, and then had a very bad COVID infection in April 2021."  The patient then presented for a routine mammogram at [facility] in June 2022 in light of the patient's complaints of right breast pain at the 12 o'clock axis and 8 centimeters from the nipple.  An indeterminate 0.9 x 0.9 x 1.2 centimeter nodule was identified at the 10 o'clock axis of the right breast 8 centimeters from nipple; an ultrasound performed on that same date showed no cysts or sonographically suspicious masses identified in the area of patient's reported pain, but a 0.1/0.2 cm cyst identified at the 11 o'clock axis 5 centimeters from nipple; there was also an irregularly marginated slightly hypoechoic 0.9 x 0.9 x 1.2 centimeter nodule identified at 10 o'clock axis 8 centimeters from nipple.  Sonographic biopsy was recommended. She chose not to proceed with a biopsy.  She had 2 "thermography tests" done, one at a center in [place], and one at a center in [place], the patient verbally reports that these returned "negative."  She was then seen by a naturopath who prescribed various therapies, as well as essential oils per her description.  Approximately 1 month later, she presented for a repeat mammogram and sonogram at the [facility] in [place] -  August 2022, a sonogram performed at that center showed in the right breast multiple echo poor avascular foci with a 7 x 8 x 10 millimeters solid mass 7 centimeters/9 centimeters with micro calculi and slightly decreased M-mode through transmission; a left breast sonogram showed fibrocystic disease.  Biopsy and MRI were recommended per outside note.  The patient reports that she chose not to proceed with an MRI or further biopsy.  She followed with an integrative medicine practitioner taking mistletoe injections into her right breast as well as ozone therapy (Dr. Naidu).  She was ultimately referred for further opinion to Dr. Coronel, a surgeon in [place], who the patient reports noted "this was too small to palpate it."  The patient noted that it did not appear to be changed over a period of seven months.  She was recommended to have a bilateral breast MRI, but the patient chose not to do so.  She subsequently reported that as there was no change, she did not proceed with any further integrative therapies of note.  She then presented for a bilateral mammogram and sonogram at [facility] on 06/28/2023.  On mammogram in the right upper outer breast, approximately 7 centimeters from nipple, middle depth, there was an approximate 3 centimeter spiculated mass with associated heterogeneous calcifications, apparent architectural distortion extending in a linear fashion to a point approximately 0.5 centimeters from nipple, suspicious for ductal extension of disease; the right upper outer breast, posterior third, approximately 12 centimeters from nipple, there was approximately 1 centimeter grouping of heterogeneous calcifications suspicious in appearance; there were no mammographic suspicious findings in the left breast; ultrasound of the right breast demonstrated at the 10 o'clock axis, 8 centimeters from the nipple, a 2.5 x 2.7 x 2.0 centimeter irregular hypoechoic mass, highly suspicious for malignancy and corresponding to the largest focal enhancing mass on MRI (also performed there separately); there were numerous additional suspicious findings throughout the breast that were well detailed in this outside report extending between 8/9 o'clock and 12 o'clock axis, highly suspicious for multifocal/multicentric disease with ultrasound-guided core biopsies and these findings in the right breast were recommended; there was an enlarged right axillary lymph node suspicious for lymph node metastases with ultrasound guided core biopsy recommended.  The patient had a bilateral breast MRI performed on 06/26/2023 with a finding in the right breast of confluent heterogeneous enhancing masses measuring 4.0 x 1.7 x 2.0 centimeters present in the upper outer breast middle depth at 10-11 o'clock as well as multiple regional clumped and heterogeneous non mass enhancement throughout the breast extending to the nipple areolar complex consistent with multifocal/multicentric malignancy; the left breast did not show any evidence of suspicious finding; there was an abnormal right axillary lymph node measuring 2 centimeters demonstrating diffuse thickened cortex consistent with metastatic adenopathy.    The patient ultimately underwent ultrasound-guided core biopsy of the right breast, multiple sites, specifically with a finding in the right axilla of metastatic breast carcinoma, ER positive, CT positive and HER-2/josé negative/no amplification; the right breast 10 o'clock axis had 2 core biopsies done which were highly suspicious for invasive carcinoma, and a third biopsy at the 10 o'clock axis returned with changes all positive for infiltrating ductal carcinoma, moderately differentiated; estrogen receptor positive, progesterone receptor positive, and HER-2/josé positive/amplified.  The patient subsequently saw Dr. Leslie Steward and these outside slides were reviewed and confirmed at [facility].  Additional slides for repeat HER2 testing have been requested but not yet received or processed of note.  The patient had a PET-CT scan on 07/05/2023 with a finding of FDG avid right breast cancer with right axillary lymph node metastasis and no distant metastasis.  Of note; there was uptake in bilateral adnexa and endometrium, which was felt to be possibly physiologic in a premenopausal patient of note.    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  At time of initial consultation, I had an extensive discussion with the patient and her aunt regarding her newly diagnosed clinical T3 N1 M0, clinical stage IIA breast cancer, noting the implications of her clinical, radiologic, and pathologic presentation on the subsequent risk of developing local and metastatic recurrence.  In light of the above, I have recommended neoadjuvant chemotherapy, specifically with that of dose dense AC chemotherapy every 2 weeks for 4 cycles followed by weekly paclitaxel weekly for 12 weeks and concomitant trastuzumab, pertuzumab every 3 weeks x 4 treatment.  Thereafter, I would recommend primary breast surgery.  Should she have no residual disease, I recommended ongoing trastuzumab and pertuzumab to complete a 52-week course; should she have residual disease, I would recommend crossing over to adjuvant Kadcyla every 3 weeks for 14 treatments.  Thereafter, I would recommend consideration of adjuvant radiation therapy, and subsequently anti estrogen therapy initially with Lupron to render her chemically menopausal (for a brief period of time as she will enter menopause with chemotherapy in light of her age), as well as an aromatase inhibitor such as anastrozole.  The potential risks, benefits, and anticipated side effects of this overall neoadjuvant/adjuvant treatment approach were extensively discussed.  The option of T-CHP was also preliminarily discussed; I have noted the results of a non-inferiority study of TCH vs ACTH,  noting that in the overall data, AC-THP has a slight advantage over T-CHP in terms of outcome.  Nonetheless, the other problem is that we do not have a secure supply of carboplatin as there is a national shortage at this time.  Consequently, I do recommend proceeding with AC- THP as noted above.  I discussed that she will likely be rendered menopausal and asked if she wished further childbearing - she does not at this time.   The patient will require pretreatment blood tests, echocardiogram, and insertion of a MediPort to proceed with current therapy.  She agrees to proceed with the same.  I have also discussed with the patient the discrepancy of her axillary lymph node biopsy, which returned HER-2/josé negative in contrast to her breast tissue which has returned HER2 positive.  I have noted she has extensive disease in the right breast, and it is possible she has heterogeneity in her tumors regarding her 2 over expression and consequently may be the discordance with her axillary biopsy results.  On the other hand, I do wish to have those slides obtained, as well as repeat testing of HER-2/josé and her axillary lymph node biopsy.  Nonetheless, I have noted that this will not change my treatment recommendations as noted above.  Repeat testing of HER-2/josé came back negative.    [de-identified] : The patient presents for follow up.    S/p dd Taxol / Onpro 4/4 on 11/8/23.  S/p B/L MRMs with JENNIFFER reconstruction on 12/15/23. Path showed 2.4 cm residual IDC, mod diff, 4+/ 24 AXLNs and 0/1 R IMLN, ER+ (95%) SD+(20% weak staining) and Her 2 josé neg (0-1+) ; had perinodal extension an done margin <1 MM skeletal muscle.   Reports she is doing well post op.   Notes a good appetite stable wt and excellent performance status. Going back to work next week.   Has mild HFs at night - started before chemo.   LMP 7/16/23  [Date: ____________] : Patient's last distress assessment performed on [unfilled]. [0 - No Distress] : Distress Level: 0 [Patient given social work contact information and resource sheet] : Patient was given social work contact information and resource sheet

## 2024-01-05 NOTE — PHYSICAL EXAM
[de-identified] : b/l breast flaps soft, nt. incisions c/d/i, healing well. good volume and symmetry. no erythema/warmth/dc. no collection or signs of infection, de la rosa doppler probes removed from b/l breasts [de-identified] :  abdomen soft, nt. incisions c/d/i and healing well. umbilicus healing well. no erythema/warmth/dc. no collection or signs of infection [de-identified] : good overall ROM, improving, although still some stiffness with some movement

## 2024-01-05 NOTE — PHYSICAL EXAM
[Restricted in physically strenuous activity but ambulatory and able to carry out work of a light or sedentary nature] : Status 1- Restricted in physically strenuous activity but ambulatory and able to carry out work of a light or sedentary nature, e.g., light house work, office work [Normal] : affect appropriate [de-identified] : sclerae anicteric [de-identified] : no labored breathing

## 2024-01-05 NOTE — REASON FOR VISIT
[Home] : at home, [unfilled] , at the time of the visit. [Medical Office: ([facility])___] : at the medical office located in  [Patient] : the patient [Follow-Up Visit] : a follow-up [Pre-Treatment Visit] : a pre-treatment [Spouse] : spouse [FreeTextEntry2] : breast cancer

## 2024-01-05 NOTE — HISTORY OF PRESENT ILLNESS
[FreeTextEntry1] : Patient s/p bilateral mastectomies and right axillary dissection, no LYMPHA, bilateral JENNIFFER flaps on 12/14/23. She presents today for routine post operative visit. She reports she is feeling well; no complaints.

## 2024-01-05 NOTE — REVIEW OF SYSTEMS
[Diarrhea: Grade 0] : Diarrhea: Grade 0 [Negative] : Psychiatric [FreeTextEntry2] : as above [de-identified] : mild HFs

## 2024-01-09 ENCOUNTER — OUTPATIENT (OUTPATIENT)
Dept: OUTPATIENT SERVICES | Facility: HOSPITAL | Age: 54
LOS: 1 days | Discharge: HOME | End: 2024-01-09
Payer: COMMERCIAL

## 2024-01-09 DIAGNOSIS — Z98.890 OTHER SPECIFIED POSTPROCEDURAL STATES: Chronic | ICD-10-CM

## 2024-01-17 NOTE — REASON FOR VISIT
[Consideration of Curative Therapy] : consideration of curative therapy for [Re-evaluation] : re-evaluation for [Breast Cancer] : breast cancer

## 2024-01-21 ENCOUNTER — NON-APPOINTMENT (OUTPATIENT)
Age: 54
End: 2024-01-21

## 2024-01-22 ENCOUNTER — APPOINTMENT (OUTPATIENT)
Dept: RADIATION ONCOLOGY | Facility: CLINIC | Age: 54
End: 2024-01-22
Payer: COMMERCIAL

## 2024-01-22 VITALS
RESPIRATION RATE: 18 BRPM | SYSTOLIC BLOOD PRESSURE: 130 MMHG | BODY MASS INDEX: 31.59 KG/M2 | HEIGHT: 65 IN | WEIGHT: 189.6 LBS | OXYGEN SATURATION: 98 % | TEMPERATURE: 97.5 F | DIASTOLIC BLOOD PRESSURE: 80 MMHG | HEART RATE: 78 BPM

## 2024-01-22 PROCEDURE — 77263 THER RADIOLOGY TX PLNG CPLX: CPT

## 2024-01-22 PROCEDURE — 77290 THER RAD SIMULAJ FIELD CPLX: CPT | Mod: 26

## 2024-01-22 PROCEDURE — 99214 OFFICE O/P EST MOD 30 MIN: CPT | Mod: 25

## 2024-01-22 PROCEDURE — 77334 RADIATION TREATMENT AID(S): CPT | Mod: 26

## 2024-01-22 RX ORDER — DEXAMETHASONE 4 MG/1
4 TABLET ORAL
Qty: 50 | Refills: 0 | Status: DISCONTINUED | COMMUNITY
Start: 2023-09-13 | End: 2024-01-22

## 2024-01-22 NOTE — HISTORY OF PRESENT ILLNESS
[FreeTextEntry1] : This is a 53 year old premenopausal female who was seen in July 2023.  She was found to have an abnormality on a routine right breast ultrasound in 7/2022 for which a biopsy was advised.  She decided to use massage and essential oils and even had an injection of mistletoe into her breast.  She followed with ultrasounds by Dr. Phan in [place]  and thermograms and saw a breast surgeon, Dr. Coronel and was told it remained stable.  She had a followup mammogram and ultrasound in July 2023 and there were suspicious masses in the right breast with a suspicious axillary node.  Biopsies of two sites in the right breast showed invasive ductal cancer 10N8 ER+MN+Her 2 FISH amplified, KI 67 30% and 10N10 ER+MN+Her 2 1+ negative.  Biopsy of the right axillary node showed cancer also, ER+MN+Her 2 nonamplified, KI 67 20%. The Her 2 was repeated on the initially positive site and found to be negative.  She met with Dr. Chan and chemotherapy was initiated.  She completed ACx4 every 2 weeks followed by weekly  Paclitaxel x12. Her last chemo was on Nov 8.   She underwent bilateral mastectomies with right sentinel node biopsy/axillary dissection and JENNIFFER reconstruction in conjunction with Dr. Evans on Dec 14, 2023.  Pathology shows residual cancer in the right breast with 4/25 positive nodes with extracapsular extension.  A LYMPHA attempt was not successful.  She has not had much pain. Some of the drains were removed today.
[Negative] : Heme/Lymph

## 2024-01-25 ENCOUNTER — APPOINTMENT (OUTPATIENT)
Dept: INFUSION THERAPY | Facility: HOSPITAL | Age: 54
End: 2024-01-25

## 2024-01-26 DIAGNOSIS — Z51.11 ENCOUNTER FOR ANTINEOPLASTIC CHEMOTHERAPY: ICD-10-CM

## 2024-01-28 NOTE — PHYSICAL EXAM
[Sclera] : the sclera and conjunctiva were normal [Outer Ear] : the ears and nose were normal in appearance [] : no respiratory distress [Cervical Lymph Nodes Enlarged Posterior Bilaterally] : posterior cervical [Cervical Lymph Nodes Enlarged Anterior Bilaterally] : anterior cervical [Supraclavicular Lymph Nodes Enlarged Bilaterally] : supraclavicular [Axillary Lymph Nodes Enlarged Bilaterally] : axillary [Musculoskeletal - Swelling] : no joint swelling [No Focal Deficits] : no focal deficits [de-identified] : abdominal incision intact [Heart Rate And Rhythm] : heart rate and rhythm were normal [Abdomen Soft] : soft [Normal] : no palpable adenopathy [de-identified] : Bilateral JENNIFFER reconstruction, no erythema

## 2024-01-28 NOTE — HISTORY OF PRESENT ILLNESS
[FreeTextEntry1] : Ms. Leary is a 53 year old female with   The patient then presented for a routine mammogram at [facility] in June 2022 due to right breast pain at the 12:00 axis and 8 cmfn. An indeterminate 0.9 x 0.9 x 1.2 centimeter nodule was identified at the 10:00 axis of the right breast 8 cmfn. On US, no suspicious masses identified in the area of patient's reported pain, but a 0.1/0.2 cm cyst identified at the 11:00 axis 5 cmfn; there was also an irregularly marginated slightly hypoechoic 0.9 x 0.9 x 1.2 centimeter nodule identified at 10:00 axis 8 cmfn. Sonographic biopsy was recommended.  She chose not to biopsy and she went to see a naturopath.    Approximately 1 month later, she presented for a repeat mammogram and sonogram at the [facility] in [place] - August 2022, a sonogram performed at that center showed in the right breast multiple echo poor avascular foci with a 7 x 8 x 10 millimeters solid mass . Biopsy and MRI were recommended per outside note. The patient reports that she chose not to proceed with an MRI or further biopsy. She followed with an integrative medicine practitioner taking mistletoe injections into her right breast as well as ozone therapy (Dr. Naidu). The patient noted that it did not appear to be changed over a period of seven months. She was recommended to have a bilateral breast MRI, but the patient chose not to do so.   She then presented for a bilateral mammogram and sonogram at [facility] on 06/28/2023. On mammogram in the right upper outer breast, approximately 7 centimeters from nipple, middle depth, there was an approximate 3 centimeter spiculated mass with associated heterogeneous calcifications, apparent architectural distortion extending in a linear fashion to a point approximately 0.5 centimeters from nipple, suspicious for ductal extension of disease; the right upper outer breast, posterior third, approximately 12 centimeters from nipple, there was approximately 1 centimeter grouping of heterogeneous calcifications suspicious in appearance; there were no mammographic suspicious findings in the left breast.ultrasound of the right breast demonstrated at the 10 o'clock axis, 8 centimeters from the nipple, a 2.5 x 2.7 x 2.0 centimeter irregular hypoechoic mass, highly suspicious for malignancy and corresponding to the largest focal enhancing mass on MRI (also performed there separately); there were numerous additional suspicious findings throughout the breast that were well detailed in this outside report extending between 8/9 o'clock and 12 o'clock axis, highly suspicious for multifocal/multicentric disease with ultrasound-guided core biopsies and these findings in the right breast were recommended; there was an enlarged right axillary lymph node suspicious for lymph node metastases with ultrasound guided core biopsy recommended.   The patient had a bilateral breast MRI performed on 06/26/2023 with a finding in the right breast of confluent heterogeneous enhancing masses measuring 4.0 x 1.7 x 2.0 centimeters present in the upper outer breast middle depth at 10-11 o'clock as well as multiple regional clumped and heterogeneous non mass enhancement throughout the breast extending to the nipple areolar complex consistent with multifocal/multicentric malignancy; the left breast did not show any evidence of suspicious finding; there was an abnormal right axillary lymph node measuring 2 centimeters demonstrating diffuse thickened cortex consistent with metastatic adenopathy.  On 6/28/23, she underwent ultrasound-guided core biopsy of the right breast, multiple sites, specifically with a finding in the right axilla of metastatic breast carcinoma, ER 90% positive, AZ 80% positive and HER-2 equivocal/negative CISH.  Right breast 10 o'clock axis had 2 core biopsies done which were highly suspicious for invasive carcinoma, and a third biopsy at the 10 o'clock axis returned with changes all positive for infiltrating ductal carcinoma, moderately differentiated; estrogen receptor positive, progesterone receptor positive, and HER-2/josé positive/amplified. The patient subsequently saw Dr. Leslie Steward and these outside slides were reviewed and confirmed at [facility].   The patient had a PET-CT scan on 07/05/2023 with a finding of FDG avid right breast cancer with right axillary lymph node metastasis and no distant metastasis.   She receivied neoadjuvant chemotherapy with Dr. Chan with AC-T, and concomitatn trastuzumab and pertuzumab every 3 weeks.  On 12/15/23,she underwent bilateral mastectomies with right sentinel node biopsy/axillary dissection and JENNIFFER reconstruction in conjunction with Dr. Evans. Pathology shows residual cancer in the right breast with 4/25 positive nodes with extracapsular extension.  ER positive 95%, AZ positive 20%, HER2 negative.

## 2024-01-28 NOTE — DISEASE MANAGEMENT
[Clinical] : TNM Stage: c [IIA] : IIA [Pathological] : TNM Stage: p [TTNM] : T2 [NTNM] : 2a [MTNM] : 0 [IIIA] : IIIA

## 2024-01-28 NOTE — PHYSICAL EXAM
[Sclera] : the sclera and conjunctiva were normal [Outer Ear] : the ears and nose were normal in appearance [] : no respiratory distress [Cervical Lymph Nodes Enlarged Posterior Bilaterally] : posterior cervical [Cervical Lymph Nodes Enlarged Anterior Bilaterally] : anterior cervical [Supraclavicular Lymph Nodes Enlarged Bilaterally] : supraclavicular [Axillary Lymph Nodes Enlarged Bilaterally] : axillary [Musculoskeletal - Swelling] : no joint swelling [No Focal Deficits] : no focal deficits [de-identified] : abdominal incision intact [Heart Rate And Rhythm] : heart rate and rhythm were normal [Abdomen Soft] : soft [Normal] : no palpable adenopathy [de-identified] : Bilateral JENNIFFER reconstruction, no erythema

## 2024-01-28 NOTE — HISTORY OF PRESENT ILLNESS
[FreeTextEntry1] : Ms. Leary is a 53 year old female with   The patient then presented for a routine mammogram at [facility] in June 2022 due to right breast pain at the 12:00 axis and 8 cmfn. An indeterminate 0.9 x 0.9 x 1.2 centimeter nodule was identified at the 10:00 axis of the right breast 8 cmfn. On US, no suspicious masses identified in the area of patient's reported pain, but a 0.1/0.2 cm cyst identified at the 11:00 axis 5 cmfn; there was also an irregularly marginated slightly hypoechoic 0.9 x 0.9 x 1.2 centimeter nodule identified at 10:00 axis 8 cmfn. Sonographic biopsy was recommended.  She chose not to biopsy and she went to see a naturopath.    Approximately 1 month later, she presented for a repeat mammogram and sonogram at the [facility] in [place] - August 2022, a sonogram performed at that center showed in the right breast multiple echo poor avascular foci with a 7 x 8 x 10 millimeters solid mass . Biopsy and MRI were recommended per outside note. The patient reports that she chose not to proceed with an MRI or further biopsy. She followed with an integrative medicine practitioner taking mistletoe injections into her right breast as well as ozone therapy (Dr. Naidu). The patient noted that it did not appear to be changed over a period of seven months. She was recommended to have a bilateral breast MRI, but the patient chose not to do so.   She then presented for a bilateral mammogram and sonogram at [facility] on 06/28/2023. On mammogram in the right upper outer breast, approximately 7 centimeters from nipple, middle depth, there was an approximate 3 centimeter spiculated mass with associated heterogeneous calcifications, apparent architectural distortion extending in a linear fashion to a point approximately 0.5 centimeters from nipple, suspicious for ductal extension of disease; the right upper outer breast, posterior third, approximately 12 centimeters from nipple, there was approximately 1 centimeter grouping of heterogeneous calcifications suspicious in appearance; there were no mammographic suspicious findings in the left breast.ultrasound of the right breast demonstrated at the 10 o'clock axis, 8 centimeters from the nipple, a 2.5 x 2.7 x 2.0 centimeter irregular hypoechoic mass, highly suspicious for malignancy and corresponding to the largest focal enhancing mass on MRI (also performed there separately); there were numerous additional suspicious findings throughout the breast that were well detailed in this outside report extending between 8/9 o'clock and 12 o'clock axis, highly suspicious for multifocal/multicentric disease with ultrasound-guided core biopsies and these findings in the right breast were recommended; there was an enlarged right axillary lymph node suspicious for lymph node metastases with ultrasound guided core biopsy recommended.   The patient had a bilateral breast MRI performed on 06/26/2023 with a finding in the right breast of confluent heterogeneous enhancing masses measuring 4.0 x 1.7 x 2.0 centimeters present in the upper outer breast middle depth at 10-11 o'clock as well as multiple regional clumped and heterogeneous non mass enhancement throughout the breast extending to the nipple areolar complex consistent with multifocal/multicentric malignancy; the left breast did not show any evidence of suspicious finding; there was an abnormal right axillary lymph node measuring 2 centimeters demonstrating diffuse thickened cortex consistent with metastatic adenopathy.  On 6/28/23, she underwent ultrasound-guided core biopsy of the right breast, multiple sites, specifically with a finding in the right axilla of metastatic breast carcinoma, ER 90% positive, CO 80% positive and HER-2 equivocal/negative CISH.  Right breast 10 o'clock axis had 2 core biopsies done which were highly suspicious for invasive carcinoma, and a third biopsy at the 10 o'clock axis returned with changes all positive for infiltrating ductal carcinoma, moderately differentiated; estrogen receptor positive, progesterone receptor positive, and HER-2/josé positive/amplified. The patient subsequently saw Dr. Leslie Steward and these outside slides were reviewed and confirmed at [facility].   The patient had a PET-CT scan on 07/05/2023 with a finding of FDG avid right breast cancer with right axillary lymph node metastasis and no distant metastasis.   She receivied neoadjuvant chemotherapy with Dr. Chan with AC-T, and concomitatn trastuzumab and pertuzumab every 3 weeks.  On 12/15/23,she underwent bilateral mastectomies with right sentinel node biopsy/axillary dissection and JENNIFFER reconstruction in conjunction with Dr. Evans. Pathology shows residual cancer in the right breast with 4/25 positive nodes with extracapsular extension.  ER positive 95%, CO positive 20%, HER2 negative.

## 2024-01-28 NOTE — DISEASE MANAGEMENT
[Clinical] : TNM Stage: c [IIA] : IIA [Pathological] : TNM Stage: p [TTNM] : T2 [NTNM] : 2a [IIIA] : IIIA [MTNM] : 0

## 2024-01-30 PROCEDURE — 77300 RADIATION THERAPY DOSE PLAN: CPT | Mod: 26

## 2024-01-30 PROCEDURE — 77334 RADIATION TREATMENT AID(S): CPT | Mod: 26

## 2024-01-30 PROCEDURE — 77295 3-D RADIOTHERAPY PLAN: CPT | Mod: 26

## 2024-02-02 PROCEDURE — 77280 THER RAD SIMULAJ FIELD SMPL: CPT | Mod: 26

## 2024-02-05 PROCEDURE — 77387C: CUSTOM

## 2024-02-06 PROCEDURE — 77387C: CUSTOM

## 2024-02-07 ENCOUNTER — NON-APPOINTMENT (OUTPATIENT)
Age: 54
End: 2024-02-07

## 2024-02-07 PROCEDURE — 77387C: CUSTOM

## 2024-02-08 PROCEDURE — 77387C: CUSTOM

## 2024-02-09 PROCEDURE — 77427 RADIATION TX MANAGEMENT X5: CPT

## 2024-02-09 PROCEDURE — 77387C: CUSTOM

## 2024-02-12 PROCEDURE — 77387C: CUSTOM

## 2024-02-13 PROCEDURE — 77387C: CUSTOM

## 2024-02-14 ENCOUNTER — NON-APPOINTMENT (OUTPATIENT)
Age: 54
End: 2024-02-14

## 2024-02-14 PROCEDURE — 77387C: CUSTOM

## 2024-02-14 NOTE — VITALS
[90: Able to carry normal activity; minor signs or symptoms of disease.] : 90: Able to carry normal activity; minor signs or symptoms of disease.  [Maximal Pain Intensity: 5/10] : 5/10 [Least Pain Intensity: 0/10] : 0/10 [Pain Description/Quality: ___] : Pain description/quality: [unfilled] [Pain Duration: ___] : Pain duration: [unfilled] [Pain Location: ___] : Pain Location: [unfilled] [NoTreatment Scheduled] : no treatment scheduled [Pain Interferes with ADLs] : Pain does not interfere with activities of daily living

## 2024-02-14 NOTE — PHYSICAL EXAM
[Normal] : well developed, well nourished, in no acute distress [de-identified] :  Bilateral JENNIFFER reconstruction, no erythema of right chest wall

## 2024-02-14 NOTE — PHYSICAL EXAM
[Normal] : well developed, well nourished, in no acute distress [de-identified] :  Bilateral JENNIFFER reconstruction, mild erythema of right chest wall

## 2024-02-14 NOTE — HISTORY OF PRESENT ILLNESS
[FreeTextEntry1] :  Sapphire is a 52 yo premenopausal female with clinical stage T3N1M0 invasive ductal carcinoma of the right breast, ER >90%, SC >90%, HER2 1+. She underwent neoadjuvant chemotherapy, followed by bilateral mastectomies with JENNIFFER reconstruction showing [id number] residual disease in the breast. She is receiving adjuvant post-mastectomy radiation therapy. She presents for a visit on treatment, 3/16 fx.   She is tolerating treatment well and moisturizing the skin. She does report intermittent mild nausea, no vomiting or sore throat. She has occasional lower back pain, comes and goes, positional, not taking analgesics.

## 2024-02-14 NOTE — DISEASE MANAGEMENT
[Pathological] : TNM Stage: p [IIIA] : IIIA [TTNM] : 2 [NTNM] : 2a [MTNM] : 0 [de-identified] : 133yKd [de-identified] : 0312 cGy [de-identified] : R chest wall and nodes

## 2024-02-14 NOTE — DISEASE MANAGEMENT
[Pathological] : TNM Stage: p [IIIA] : IIIA [TTNM] : 2 [NTNM] : 2a [MTNM] : 0 [de-identified] : 2841rGy [de-identified] : 4089 cGy [de-identified] : R chest wall and nodes

## 2024-02-14 NOTE — HISTORY OF PRESENT ILLNESS
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 She underwent neoadjuvant chemotherapy, followed by bilateral mastectomies with JENNIFFER reconstruction showing [id number] residual disease in the breast. She is receiving adjuvant post-mastectomy radiation therapy. She presents for a visit on treatment, 8/16 fx.  She is tolerating treatment well and moisturizing the skin. She denies breast pain. For the past 2 weeks, she has point specific pain in the center of her thoracic spine.  She only feels the pain when leaning backwards, otherwise she does not have pain. She is not taking pain medication as it is positional. She denies neck or leg discomfort. No rash.

## 2024-02-15 PROCEDURE — 77387C: CUSTOM

## 2024-02-16 ENCOUNTER — APPOINTMENT (OUTPATIENT)
Dept: HEMATOLOGY ONCOLOGY | Facility: CLINIC | Age: 54
End: 2024-02-16
Payer: COMMERCIAL

## 2024-02-16 VITALS
OXYGEN SATURATION: 96 % | TEMPERATURE: 97.6 F | HEIGHT: 65 IN | HEART RATE: 81 BPM | SYSTOLIC BLOOD PRESSURE: 116 MMHG | BODY MASS INDEX: 30.85 KG/M2 | DIASTOLIC BLOOD PRESSURE: 80 MMHG | WEIGHT: 185.19 LBS | RESPIRATION RATE: 18 BRPM

## 2024-02-16 DIAGNOSIS — M54.50 LOW BACK PAIN, UNSPECIFIED: ICD-10-CM

## 2024-02-16 DIAGNOSIS — G89.29 LOW BACK PAIN, UNSPECIFIED: ICD-10-CM

## 2024-02-16 PROCEDURE — 77427 RADIATION TX MANAGEMENT X5: CPT

## 2024-02-16 PROCEDURE — 77387C: CUSTOM

## 2024-02-16 PROCEDURE — 99215 OFFICE O/P EST HI 40 MIN: CPT

## 2024-02-16 PROCEDURE — G2211 COMPLEX E/M VISIT ADD ON: CPT

## 2024-02-17 PROBLEM — M54.50 CHRONIC MIDLINE LOW BACK PAIN WITHOUT SCIATICA: Status: ACTIVE | Noted: 2024-02-17

## 2024-02-17 RX ORDER — LORAZEPAM 1 MG/1
1 TABLET ORAL
Qty: 20 | Refills: 0 | Status: DISCONTINUED | COMMUNITY
Start: 2023-09-27 | End: 2024-02-17

## 2024-02-17 NOTE — PHYSICAL EXAM
[Restricted in physically strenuous activity but ambulatory and able to carry out work of a light or sedentary nature] : Status 1- Restricted in physically strenuous activity but ambulatory and able to carry out work of a light or sedentary nature, e.g., light house work, office work [Normal] : affect appropriate [de-identified] : bilateral mastectomy with minimal edema in the right breast inferior portion; incisions have healed well [de-identified] : well healed JENNIFFER flap scars [de-identified] : mid-thoracic to lumbar back-pain; worse when she leans forward [de-identified] : as above

## 2024-02-17 NOTE — REVIEW OF SYSTEMS
[Night Sweats] : night sweats [Diarrhea: Grade 0] : Diarrhea: Grade 0 [Hot Flashes] : hot flashes [Negative] : Psychiatric [FreeTextEntry9] : mid-to lower back pain as above [de-identified] : hair thinning

## 2024-02-17 NOTE — ASSESSMENT
[FreeTextEntry1] : 54 yo woman with clinical Stage IIA, pathological Stage IIIA (T2N2, ER+IA weak, Her2 negative), moderately differentiated right breast cancer s/p neoadjuvant ddAC/ddT, s/p bilateral mastectomy with JENNIFFER flap and due to close margins and 4+ LN, undergoing right breast post mastectomy RT  - for new back-pain, will order MRI thoracic and lumbar spine due to concern for metastatic disease - continue with Lupron injection monthly; injection #3 due on 3/21/24; will get labs done prior to ensure she is post-menopausal and will likely continue Lupron until at least 7/2024 (1 year without menstrual cycle as she stopped having periods in 7/2023) - once deemed post-menopausal would start with anastrozole 1 mg daily with plan for about 10 years of therapy - risks/benefits/side effects including but not limited to increased risk of osteoporosis, worsening arthralgia/myalgia, rise in LFTs that will need to be monitored every 6 months and worsening lipid profile that will need to be monitored by PMD - will order bone density - once she is deemed stable on AI without toxicity, will also add abemaciclib 150mg BID for 2 year period due to further reduction of risk of recurrence by about 8% - risk/benefits/side effects including but not limited to diarrhea, fatigue and secondary hematologic malignancies discussed - once she is stable, will also consider addition of zolendronic acid 4mg every 6 months for period of 3 years to further reduce the risk of bone metastases in patient with ER+ disease - risk/benefits/side effects including flu like symptoms and osteonecrosis of the jaw discussed with patient  - reviewed her germline genetic testing with [facility]; 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 Will refer patient; she is aware  - Patient had the opportunity to have all their questions answered to their satisfaction - f/u 1 month as scheduled

## 2024-02-17 NOTE — HISTORY OF PRESENT ILLNESS
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   She chose not to proceed with a biopsy.  She had 2 "thermography tests" done, one at a center in [place], and one at a center in [place], the patient verbally reports that these returned "negative."  She was then seen by a naturopath who prescribed various therapies, as well as essential oils per her description.  Approximately 1 month later, she presented for a repeat mammogram and sonogram at the [facility] in [place] - August 2022, a sonogram performed at that center showed in the right breast multiple echo poor avascular foci with a 7 x 8 x 10 millimeters solid mass with micro calculi and slightly decreased M-mode through transmission; a left breast sonogram showed fibrocystic disease.  Biopsy and MRI were recommended per outside note.  The patient reports that she chose not to proceed with an MRI or further biopsy.  She followed with an integrative medicine practitioner taking mistletoe injections into her right breast as well as ozone therapy (Dr. Naidu).  She repeatedly declined MRI imaging and due to no further change she opted to forgo any additional therapies.  She then presented for a bilateral mammogram and sonogram at [facility] on 06/28/2023  .  - right bresat approximate 3 centimeter spiculated mass with associated heterogeneous calcifications, apparent architectural distortion extending in a linear fashion to a point approximately 0.5 cmFN suspicious for ductal extension of disease; -  the right upper outer breast, posterior third, ~12cmFN, there was approximately 1 centimeter grouping of heterogeneous calcifications suspicious in appearance; there were no mammographic suspicious findings in the left breast;  ultrasound of the right breast demonstrated at the 10 o'clock axis, 8cmFN a 2.5 x 2.7 x 2.0 centimeter irregular hypoechoic mass, highly suspicious for malignancy ; there was an enlarged right axillary lymph node suspicious for lymph node metastases with ultrasound guided core biopsy recommended.   Bilateral breast MRI performed on 06/26/2023 with a finding in the right breast of confluent heterogeneous enhancing masses measuring 4.0 x 1.7 x 2.0 centimeters present in the upper outer breast middle depth at 10-11 o'clock as well as multiple regional clumped and heterogeneous non mass enhancement throughout the breast extending to the nipple areolar complex consistent with multifocal/multicentric malignancy; the left breast did not show any evidence of suspicious finding; there was an abnormal right axillary lymph node measuring 2 centimeters demonstrating diffuse thickened cortex consistent with metastatic adenopathy.    Biopsy - invasive ductal carcinoma ER >90%, ND >90%, HER2 neg (IHC 1+); referred for surgical evaluation.   Staging PET-CT scan on 07/05/2023 with a finding of FDG avid right breast cancer with right axillary lymph node metastasis and no distant metastasis.  Of note; there was uptake in bilateral adnexa and endometrium, which was felt to be possibly physiologic in a premenopausal patient of note.    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  Diagnosed with Stage IIA Breast cancer (clinical T3 N1 M0)  S/p dd AC X4 followd by vivian TaxolX4 with growth factor support which she completed on 11/8/23.  S/p B/L MRMs with Dr. Mallory with JENNIFFER reconstruction on 12/15/23 with Dr. Joss Evans Final Path showed 2.4 cm residual IDC, mod diff, 4+/ 24 AXLNs and 0/1 R IMLN, ER+ (95%) ND+(20% weak staining) and Her 2 josé neg (0-1+) ; had perinodal extension an done margin <1 MM skeletal muscle; for which she was referred for Radiation Oncology.  [de-identified] : ER+ 95%, MO weak 20%, Her2 negative [de-identified] : 2/16/24 Transferring care from Dr. Chan to me (Dr. Dee) today as Dr. Chan has left [facility] All of the patient's prior records including radiology, pathology and prior notes reviewed; Past Medical History, Past Surgical History, Family History and Social history reviewed and updated in the patient's chart.  Patient returns today to rule out progression or recurrence of breast cancer and to assess treatment toxicity. Has some minimal hot flushes  Reports mid to lower back pain that is new and worse when she leans forward  Tolerating RT well; planned to complete on 3/1/24 Has started Lupron monthly as LMP 7/16/23;   [100: Normal, no complaints, no evidence of disease.] : 100: Normal, no complaints, no evidence of disease. [ECOG Performance Status: 0 - Fully active, able to carry on all pre-disease performance without restriction] : Performance Status: 0 - Fully active, able to carry on all pre-disease performance without restriction

## 2024-02-19 ENCOUNTER — APPOINTMENT (OUTPATIENT)
Dept: ULTRASOUND IMAGING | Facility: CLINIC | Age: 54
End: 2024-02-19
Payer: COMMERCIAL

## 2024-02-19 PROCEDURE — 76830 TRANSVAGINAL US NON-OB: CPT

## 2024-02-19 PROCEDURE — 76856 US EXAM PELVIC COMPLETE: CPT

## 2024-02-20 ENCOUNTER — NON-APPOINTMENT (OUTPATIENT)
Age: 54
End: 2024-02-20

## 2024-02-20 PROCEDURE — 77387C: CUSTOM

## 2024-02-21 ENCOUNTER — APPOINTMENT (OUTPATIENT)
Dept: INFUSION THERAPY | Facility: HOSPITAL | Age: 54
End: 2024-02-21

## 2024-02-21 PROCEDURE — 77387C: CUSTOM

## 2024-02-21 NOTE — VITALS
[Maximal Pain Intensity: 5/10] : 5/10 [Least Pain Intensity: 0/10] : 0/10 [Pain Description/Quality: ___] : Pain description/quality: [unfilled] [Pain Duration: ___] : Pain duration: [unfilled] [Pain Location: ___] : Pain Location: [unfilled] [NoTreatment Scheduled] : no treatment scheduled

## 2024-02-22 PROCEDURE — 77387C: CUSTOM

## 2024-02-23 PROCEDURE — 77387C: CUSTOM

## 2024-02-26 PROCEDURE — 77427 RADIATION TX MANAGEMENT X5: CPT

## 2024-02-26 PROCEDURE — 77387C: CUSTOM

## 2024-02-27 ENCOUNTER — NON-APPOINTMENT (OUTPATIENT)
Age: 54
End: 2024-02-27

## 2024-02-27 PROCEDURE — 77387C: CUSTOM

## 2024-02-28 NOTE — HISTORY OF PRESENT ILLNESS
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 She underwent neoadjuvant chemotherapy, followed by bilateral mastectomies with JENNIFFER reconstruction showing [id number] residual disease in the breast. She is receiving adjuvant post-mastectomy radiation therapy. She presents for a visit on treatment, 12/16 fx.  She has been feeling generally well, mild fatigue and continued back pain. She is scheduled for MRI this week. She notes mild redness of the skin and mild heaviness of the reconstructed breast.

## 2024-02-28 NOTE — DISEASE MANAGEMENT
[Pathological] : TNM Stage: p [TTNM] : 2 [NTNM] : 2a [IIIA] : IIIA [MTNM] : 0 [de-identified] : 5185 cGy [de-identified] : R chest wall and nodes [de-identified] : 2702 cGy

## 2024-02-28 NOTE — PHYSICAL EXAM
[Normal] : well developed, well nourished, in no acute distress [] : no respiratory distress [de-identified] : Bilateral JENNIFFER reconstruction, mild erythema of right chest wall.

## 2024-02-28 NOTE — VITALS
[Maximal Pain Intensity: 5/10] : 5/10 [Least Pain Intensity: 0/10] : 0/10 [Pain Description/Quality: ___] : Pain description/quality: [unfilled] [Pain Duration: ___] : Pain duration: [unfilled] [Pain Interferes with ADLs] : Pain does not interfere with activities of daily living [Pain Location: ___] : Pain Location: [unfilled] [80: Normal activity with effort; some signs or symptoms of disease.] : 80: Normal activity with effort; some signs or symptoms of disease.  [NoTreatment Scheduled] : no treatment scheduled

## 2024-02-29 ENCOUNTER — APPOINTMENT (OUTPATIENT)
Dept: MRI IMAGING | Facility: CLINIC | Age: 54
End: 2024-02-29
Payer: COMMERCIAL

## 2024-02-29 PROCEDURE — 72157 MRI CHEST SPINE W/O & W/DYE: CPT

## 2024-02-29 PROCEDURE — A9585: CPT

## 2024-02-29 PROCEDURE — 72158 MRI LUMBAR SPINE W/O & W/DYE: CPT

## 2024-03-04 ENCOUNTER — NON-APPOINTMENT (OUTPATIENT)
Age: 54
End: 2024-03-04

## 2024-03-11 ENCOUNTER — OUTPATIENT (OUTPATIENT)
Dept: OUTPATIENT SERVICES | Facility: HOSPITAL | Age: 54
LOS: 1 days | Discharge: HOME | End: 2024-03-11

## 2024-03-11 DIAGNOSIS — Z98.890 OTHER SPECIFIED POSTPROCEDURAL STATES: Chronic | ICD-10-CM

## 2024-03-11 DIAGNOSIS — C50.919 MALIGNANT NEOPLASM OF UNSPECIFIED SITE OF UNSPECIFIED FEMALE BREAST: ICD-10-CM

## 2024-03-21 ENCOUNTER — RESULT REVIEW (OUTPATIENT)
Age: 54
End: 2024-03-21

## 2024-03-21 ENCOUNTER — NON-APPOINTMENT (OUTPATIENT)
Age: 54
End: 2024-03-21

## 2024-03-21 ENCOUNTER — APPOINTMENT (OUTPATIENT)
Dept: INFUSION THERAPY | Facility: HOSPITAL | Age: 54
End: 2024-03-21

## 2024-03-21 ENCOUNTER — APPOINTMENT (OUTPATIENT)
Dept: HEMATOLOGY ONCOLOGY | Facility: CLINIC | Age: 54
End: 2024-03-21

## 2024-03-21 DIAGNOSIS — Z91.89 OTHER SPECIFIED PERSONAL RISK FACTORS, NOT ELSEWHERE CLASSIFIED: ICD-10-CM

## 2024-03-21 LAB
BASOPHILS # BLD AUTO: 0.06 K/UL — SIGNIFICANT CHANGE UP (ref 0–0.2)
BASOPHILS NFR BLD AUTO: 1.6 % — SIGNIFICANT CHANGE UP (ref 0–2)
EOSINOPHIL # BLD AUTO: 0.12 K/UL — SIGNIFICANT CHANGE UP (ref 0–0.5)
EOSINOPHIL NFR BLD AUTO: 3.2 % — SIGNIFICANT CHANGE UP (ref 0–6)
HCT VFR BLD CALC: 37.3 % — SIGNIFICANT CHANGE UP (ref 34.5–45)
HGB BLD-MCNC: 12.8 G/DL — SIGNIFICANT CHANGE UP (ref 11.5–15.5)
IMM GRANULOCYTES NFR BLD AUTO: 0 % — SIGNIFICANT CHANGE UP (ref 0–0.9)
LYMPHOCYTES # BLD AUTO: 0.65 K/UL — LOW (ref 1–3.3)
LYMPHOCYTES # BLD AUTO: 17.1 % — SIGNIFICANT CHANGE UP (ref 13–44)
MCHC RBC-ENTMCNC: 28.9 PG — SIGNIFICANT CHANGE UP (ref 27–34)
MCHC RBC-ENTMCNC: 34.3 G/DL — SIGNIFICANT CHANGE UP (ref 32–36)
MCV RBC AUTO: 84.2 FL — SIGNIFICANT CHANGE UP (ref 80–100)
MONOCYTES # BLD AUTO: 0.36 K/UL — SIGNIFICANT CHANGE UP (ref 0–0.9)
MONOCYTES NFR BLD AUTO: 9.5 % — SIGNIFICANT CHANGE UP (ref 2–14)
NEUTROPHILS # BLD AUTO: 2.61 K/UL — SIGNIFICANT CHANGE UP (ref 1.8–7.4)
NEUTROPHILS NFR BLD AUTO: 68.6 % — SIGNIFICANT CHANGE UP (ref 43–77)
NRBC # BLD: 0 /100 WBCS — SIGNIFICANT CHANGE UP (ref 0–0)
PLATELET # BLD AUTO: 175 K/UL — SIGNIFICANT CHANGE UP (ref 150–400)
RBC # BLD: 4.43 M/UL — SIGNIFICANT CHANGE UP (ref 3.8–5.2)
RBC # FLD: 13.3 % — SIGNIFICANT CHANGE UP (ref 10.3–14.5)
WBC # BLD: 3.8 K/UL — SIGNIFICANT CHANGE UP (ref 3.8–10.5)
WBC # FLD AUTO: 3.8 K/UL — SIGNIFICANT CHANGE UP (ref 3.8–10.5)

## 2024-03-22 DIAGNOSIS — Z51.11 ENCOUNTER FOR ANTINEOPLASTIC CHEMOTHERAPY: ICD-10-CM

## 2024-03-22 LAB
ALBUMIN SERPL ELPH-MCNC: 4.4 G/DL
ALP BLD-CCNC: 61 U/L
ALT SERPL-CCNC: 17 U/L
ANION GAP SERPL CALC-SCNC: 14 MMOL/L
AST SERPL-CCNC: 14 U/L
BILIRUB SERPL-MCNC: 0.4 MG/DL
BUN SERPL-MCNC: 16 MG/DL
CALCIUM SERPL-MCNC: 9.3 MG/DL
CHLORIDE SERPL-SCNC: 102 MMOL/L
CO2 SERPL-SCNC: 24 MMOL/L
CREAT SERPL-MCNC: 0.54 MG/DL
EGFR: 111 ML/MIN/1.73M2
FSH SERPL-MCNC: 6.4 IU/L
GLUCOSE SERPL-MCNC: 62 MG/DL
LH SERPL-ACNC: <0.3 IU/L
POTASSIUM SERPL-SCNC: 4.1 MMOL/L
PROT SERPL-MCNC: 6.4 G/DL
SODIUM SERPL-SCNC: 141 MMOL/L

## 2024-03-27 ENCOUNTER — APPOINTMENT (OUTPATIENT)
Dept: PHYSICAL MEDICINE AND REHAB | Facility: CLINIC | Age: 54
End: 2024-03-27
Payer: COMMERCIAL

## 2024-03-27 DIAGNOSIS — G89.28 OTHER CHRONIC POSTPROCEDURAL PAIN: ICD-10-CM

## 2024-03-27 DIAGNOSIS — I89.0 LYMPHEDEMA, NOT ELSEWHERE CLASSIFIED: ICD-10-CM

## 2024-03-27 DIAGNOSIS — R60.0 LOCALIZED EDEMA: ICD-10-CM

## 2024-03-27 PROCEDURE — 99204 OFFICE O/P NEW MOD 45 MIN: CPT

## 2024-03-27 NOTE — HISTORY OF PRESENT ILLNESS
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 She underwent neoadjuvant chemotherapy, followed by bilateral mastectomies with JENNIFFER reconstruction,4/25 positive nodes.  12/14/23 The patient received adjuvant post-mastectomy radiation therapy from 2/5/2024 through 2/27/2024 and had total of 8480cGy.   On lupron.  She reports that over the past week she has noticed intermittent swelling that comes and goes.  Better with elevation.  Feels like it is the worst in her dorsal hand and fingers.  Denies any erythema or increased warmth.  Still has some tightness in her right chest wall region.  Doing physical therapy which is helping.  No weakness.  No bowel bladder dysfunction.    Denies any electronic implants or pregnancy.

## 2024-03-27 NOTE — ASSESSMENT
[FreeTextEntry1] : 53 year old female presenting for evaluation.  #RUE Lymphedema: -Clinical signs of stage 1 lymphedema -Educated about lymphedema -Obtain US RUE to rule out DVT -Start CDT -Case discussed with Indy Alvarez-PT, start MLD -Rx given for compression sleeve and gauntlet 20-30mmHg, start daily -The patient requires ready to wear compression sleeve and gauntlet 20-30mmHg due to her lymphedema to maintain and prevent worsening of this condition.  Lymphedema is a chronic/lifetime condition and the patient will have continued need for these supplies.  #Post-mastectomy Pain syndrome: -Continue PT -Precautions per plastic surgery  Follow up in 6 weeks.

## 2024-03-27 NOTE — PHYSICAL EXAM
[FreeTextEntry1] : Gen: Patient is A&O x 3, NAD HEENT: EOMI, hearing grossly normal Resp: regular, non - labored CV: pulses regular Skin: no rashes, erythema Lymph: subtle edema in RUE  Inspection: no instability  ROM: full throughout Palpation: Mild TTP right chest wall  Sensation: intact to light touch Reflexes: 1+ and symmetric throughout Strength: 5/5 throughout Gait: normal, non-antalgic  Bioimpedance spectroscopy performed today with L-Dex 3.3 RUE (post-op baseline)

## 2024-03-28 RX ORDER — ANASTROZOLE TABLETS 1 MG/1
1 TABLET ORAL DAILY
Qty: 30 | Refills: 5 | Status: ACTIVE | COMMUNITY
Start: 2024-03-28 | End: 1900-01-01

## 2024-03-29 LAB — ESTRADIOL ULTRASENSITIVE: 3 PG/ML

## 2024-04-03 ENCOUNTER — APPOINTMENT (OUTPATIENT)
Dept: ULTRASOUND IMAGING | Facility: CLINIC | Age: 54
End: 2024-04-03
Payer: COMMERCIAL

## 2024-04-03 ENCOUNTER — OUTPATIENT (OUTPATIENT)
Dept: OUTPATIENT SERVICES | Facility: HOSPITAL | Age: 54
LOS: 1 days | End: 2024-04-03
Payer: COMMERCIAL

## 2024-04-03 ENCOUNTER — APPOINTMENT (OUTPATIENT)
Dept: RADIOLOGY | Facility: CLINIC | Age: 54
End: 2024-04-03
Payer: COMMERCIAL

## 2024-04-03 ENCOUNTER — TRANSCRIPTION ENCOUNTER (OUTPATIENT)
Age: 54
End: 2024-04-03

## 2024-04-03 DIAGNOSIS — Z00.8 ENCOUNTER FOR OTHER GENERAL EXAMINATION: ICD-10-CM

## 2024-04-03 DIAGNOSIS — Z98.890 OTHER SPECIFIED POSTPROCEDURAL STATES: Chronic | ICD-10-CM

## 2024-04-03 PROCEDURE — 76831 ECHO EXAM UTERUS: CPT

## 2024-04-03 PROCEDURE — 77080 DXA BONE DENSITY AXIAL: CPT | Mod: 26

## 2024-04-03 PROCEDURE — 76831 ECHO EXAM UTERUS: CPT | Mod: 26

## 2024-04-03 PROCEDURE — 58340 CATHETER FOR HYSTEROGRAPHY: CPT

## 2024-04-03 PROCEDURE — 77080 DXA BONE DENSITY AXIAL: CPT

## 2024-04-08 ENCOUNTER — APPOINTMENT (OUTPATIENT)
Dept: HEMATOLOGY ONCOLOGY | Facility: CLINIC | Age: 54
End: 2024-04-08

## 2024-04-09 NOTE — DISCUSSION/SUMMARY
[FreeTextEntry1] : REASON FOR CONSULT  Fozia Leary is a 53-year-old female who was referred by Dr. Gianni Dee for a discussion regarding her genetic testing results related to hereditary cancer predisposition. Student Elizabet Hayes also participated in the session.   RELEVANT MEDICAL HISTORY  Ms. Leary was diagnosed with right breast invasive ductal carcinoma in 2023 at age 53. She was treated with neoadjuvant chemotherapy, bilateral mastectomy on 12/15/2023 with reconstruction, and radiation therapy. She is currently on Lupron injections.   Ms. Leary pursued genetic testing using [employer]'s Hereditary Cancer Test and an increased risk allele was detected in the APC gene (APC [id number]; [id number]). This test was ordered by Dr. Naomie Mallory and reported on 2018.   OTHER MEDICAL AND SURGICAL HISTORY:  -	Medical history: constipation, facial rhytids, insomnia, stomatitis, reflux, melanoma, obstructive sleep apnea, osteoporosis.  -	Surgical history: axillary lymphadenectomy, breast biopsy, deep inferior epigastric  flap, bilateral mastectomy, liposuction, central IV with subcutaneous reservoir Medi port single lumen.   PAST OB/GYN HISTORY:  Obstetrical History:  Age at Menarche: 16 Menopausal, periods stopped during chemotherapy.  Age at First Live Birth: 38 Oral Contraceptive Use: pill use reported for "couple of months"   Hormone Replacement Therapy: estrogen cream reported for several months.     CANCER SCREENING HISTORY:    Breast:  -	Mammogram: annual, diagnostic mammogram and sonogram on 2023, detected a highly suspicious 2.7 cm dominant mass in the right breast, numerous additional suspicious findings in the right breast highly suspicious for multifocal/multicentric disease.  -	MRI: done on 2023, confluent heterogeneous enhancing masses in the right upper outer breast, multi-regional clumped and heterogeneous non mass enhancement throughout right breast. MRI guided biopsy 2x is recommended.  -	Staging PET-CT scan on 2023 with a finding of FDG avid right breast cancer with right axillary lymph node metastasis and no distant metastasis.  -	Biopsy: None reported prior to cancer diagnosis.  GYN:  -	Pelvic exams: annual exams reported.  -	Sono hysterogram: done on 4/3/2024 d/t questionable polyp detected on prior ultrasound, detected 0.6 cm broad based polyp involving the anterior mid endometrium corresponding to previous ultrasound. -	Reported h/o possible endometrial polyp removed about 2 years ago.  Colon:  -	Colonoscopy: baseline reported in , negative. 3-year follow-up frequency recommended.  -	Endoscopy: reported around age 19 d/t phlegm production. Negative history.  Skin:    -	FBSE: reported 3-4 exams per year. -	Reported several precancerous lesions removed in 20s/30s, 10 years ago - precancerous lesion removed. Early-stage melanoma diagnosed around 2019 at age 48 - excision only.    SOCIAL HISTORY:  -	Tobacco-product use: None -	Secondhand smoke: father smoked in childhood.    FAMILY HISTORY:  Maternal and paternal ancestry were both reported as [place], Ashkenazi Mos[place]. A detailed family history of cancer was ascertained, see below and scanned pedigree in chart.   To Ms. Leary's knowledge, no one in the family has had germline testing for cancer susceptibility.      RESULTS INTERPRETATION AND ASSESSMENT:  We reviewed with Ms. Leary that she previously tested positive for the [id number] variant in the APC gene, which is considered a risk allele for only colorectal cancer. Of specific note, it was emphasized this risk allele is not associated with familial adenomatous polyposis (FAP) or attenuated-FAP. This variant [id number] is found in up to 10% of the Ashkenazi Mos[place] population. Current evidence indicates potentially up to a two-fold increase (up to 10%) in the lifetime risk for colorectal cancer for those of Ashkenazi Mos[place] ancestry compared to the general population risk of 4.2%.  We also discussed that, while no cause of the patient's personal and family history of cancer was identified, this result, while reassuring, does entirely not rule out a hereditary cancer risk in the patient. It is possible, although unlikely, the patient has a mutation in one of the genes tested that is not detectable by this analysis, or has a mutation in a different gene, either known or unknown. It is also possible there is a hereditary cancer predisposition in the family, but the patient did not inherit it.    IMPLICATIONS FOR THE PATIENT:  Given Ms. Leary's personal and current reported family history of cancer, and her APC risk allele positive genetic test results, the following screening guidelines and risk-reducing recommendations were discussed:    BREAST:   Long-term management and surveillance should be based on Ms. Leary's on- or post-treatment protocol as recommended by her oncology team.    COLON:  For individuals with an APC risk allele, NCCN typically recommends colonoscopies at least every 5 years beginning at age 40 or 10 years prior to age of first-degree relative's CRC diagnosis. Of note, Ms. Leary has already received the recommendation for a 3-year follow-up frequency as per her gastroenterologist after her baseline colonoscopy in .  SKIN: - We encouraged Ms. Leary to undergo routine full body skin exams at the discretion of her dermatologist given her personal history of an early stage melanoma at age 48.   OTHER:   - In the absence of other indications, Ms. Leary should practice age-appropriate cancer screening of other organ systems as recommended for the general population.    IMPLICATIONS FOR FAMILY MEMBERS:  This APC risk allele is inherited in an autosomal dominant pattern. We recommend the patient's first-degree relatives, specifically her parents, pursue genetic counseling and genetic testing as there is a 50% chance they also have the same mutation. Ms. Leary was made aware that if any at-risk relatives wanted to pursue genetic testing any time in the future, we would be happy to see them and coordinate testing. If they are not local, they can locate a genetic counselor using the National Society of Genetic Counselors, Find a Genetic Counselor Tool (www.nsgc.org/findageneticcounselor).   The risk of passing on this mutation to a future generation is 50%. We recommend that the patient's son pursue genetic counseling and genetic testing after he reaches adulthood. Please note, we do not generally recommend genetic testing for children until they are over the age of 18.   PLAN:  1. See above note for recommended management.  2. We encouraged sharing these results with family members. They have a risk to have inherited the same mutation. Other family may benefit from genetic testing and should contact a certified genetic counselor specializing in cancer. Due to HIPAA and [place] laws, Genetics is unable to directly contact other family at risk, but we are available should family members wish to reach out to us. 3. Family support resources and referrals were provided.   4. Patient informed consult note(s) will be available through their [facility] patient portal. 5. Genetic knowledge changes rapidly. We encouraged re-contacting Cancer Genetics every 2-3 years for any changes in screening recommendations or sooner if there are significant changes in personal or family history.   For any additional questions please call Cancer Genetics at [phone].       Rajni Rand MS, [facility] [place]  Genetic Counselor, Cancer Genetics     CC: Gianni Dee MD

## 2024-04-11 ENCOUNTER — NON-APPOINTMENT (OUTPATIENT)
Age: 54
End: 2024-04-11

## 2024-04-11 ENCOUNTER — APPOINTMENT (OUTPATIENT)
Dept: RADIATION ONCOLOGY | Facility: CLINIC | Age: 54
End: 2024-04-11
Payer: COMMERCIAL

## 2024-04-11 VITALS
HEART RATE: 72 BPM | RESPIRATION RATE: 18 BRPM | SYSTOLIC BLOOD PRESSURE: 166 MMHG | TEMPERATURE: 96.3 F | WEIGHT: 192.35 LBS | OXYGEN SATURATION: 100 % | BODY MASS INDEX: 32.01 KG/M2 | DIASTOLIC BLOOD PRESSURE: 78 MMHG

## 2024-04-11 PROCEDURE — 99024 POSTOP FOLLOW-UP VISIT: CPT

## 2024-04-17 NOTE — VITALS
[Maximal Pain Intensity: 0/10] : 0/10 [90: Able to carry normal activity; minor signs or symptoms of disease.] : 90: Able to carry normal activity; minor signs or symptoms of disease.  [80: Normal activity with effort; some signs or symptoms of disease.] : 80: Normal activity with effort; some signs or symptoms of disease.

## 2024-04-17 NOTE — HISTORY OF PRESENT ILLNESS
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 She underwent neoadjuvant chemotherapy, followed by bilateral mastectomies with JENNIFFER reconstruction showing [id number] residual disease. She then received adjuvant post-mastectomy radiation therapy from 2/5/2024 through 2/27/2024, a total dose of 4240cGy to the right chest wall and lymph nodes. She presents today for post treatment evaluation.  The patient reports feeling generally improved with less fatigue. She is involved in her usual activities. She denies cough or shortness of breath. She developed grade 1 radiation dermatitis. There has been some improvement in the skin since completing radiation therapy. Since treatment, she developed RUE pain and tingling, particularly at night, with minimal edema RUE. She is seeing Dr. Kumar PM&R and initiated treatment with the lymphedema clinic (first visit yesterday.) She started to wear a compression sleeve and gauntlet, but notes worsening edema in her fingers when wearing gauntlet. She notes hardened lumps under the right axilla, with some improvement after massage yesterday. She has full ROM b/l UE and denies breast pain. She started anastrozole.

## 2024-04-17 NOTE — PHYSICAL EXAM
[] : no respiratory distress [Respiration, Rhythm And Depth] : normal respiratory rhythm and effort [Exaggerated Use Of Accessory Muscles For Inspiration] : no accessory muscle use [No Focal Deficits] : no focal deficits [Sclera] : the sclera and conjunctiva were normal [Heart Rate And Rhythm] : heart rate and rhythm were normal [Abdomen Soft] : soft [Nondistended] : nondistended [Normal] : no palpable adenopathy [Supraclavicular Lymph Nodes Enlarged Bilaterally] : supraclavicular [Axillary Lymph Nodes Enlarged Bilaterally] : axillary [de-identified] : Bilateral JENNIFFER reconstruction, mild residual hyperpigmentation of right chest wall. no skin lesions or soft tissue nodules, mild edema of dependent portion of reconstruction and right axillary region

## 2024-04-17 NOTE — REVIEW OF SYSTEMS
[Dyspepsia: Grade 0] : Dyspepsia: Grade 0 [Dysphagia: Grade 0] : Dysphagia: Grade 0 [Nausea: Grade 0] : Nausea: Grade 0 [Edema Limbs: Grade 1-  5 - 10% inter-limb discrepancy in volume or circumference at point of greatest visible difference; swelling or obscuration of anatomic architecture on close inspection] : Edema Limbs: Grade 1-  5 - 10% inter-limb discrepancy in volume or circumference at point of greatest visible difference; swelling or obscuration of anatomic architecture on close inspection [Fatigue: Grade 0] : Fatigue: Grade 0 [Breast Pain: Grade 0] : Breast Pain: Grade 0 [Pruritus: Grade 0] : Pruritus: Grade 0 [Skin Atrophy: Grade 0] : Skin Atrophy: Grade 0 [Dermatitis Radiation: Grade 0] : Dermatitis Radiation: Grade 0 [Alopecia: Grade 0] : Alopecia: Grade 0 [Skin Hyperpigmentation: Grade 1 - Hyperpigmentation covering <10% BSA; no psychosocial impact] : Skin Hyperpigmentation: Grade 1 - Hyperpigmentation covering <10% BSA; no psychosocial impact [Skin Induration: Grade 0] : Skin Induration: Grade 0

## 2024-04-18 ENCOUNTER — APPOINTMENT (OUTPATIENT)
Dept: INFUSION THERAPY | Facility: HOSPITAL | Age: 54
End: 2024-04-18

## 2024-04-18 ENCOUNTER — RESULT REVIEW (OUTPATIENT)
Age: 54
End: 2024-04-18

## 2024-04-18 ENCOUNTER — APPOINTMENT (OUTPATIENT)
Dept: HEMATOLOGY ONCOLOGY | Facility: CLINIC | Age: 54
End: 2024-04-18
Payer: COMMERCIAL

## 2024-04-18 VITALS
BODY MASS INDEX: 31.55 KG/M2 | RESPIRATION RATE: 16 BRPM | HEART RATE: 83 BPM | SYSTOLIC BLOOD PRESSURE: 105 MMHG | OXYGEN SATURATION: 99 % | WEIGHT: 189.6 LBS | TEMPERATURE: 97.2 F | DIASTOLIC BLOOD PRESSURE: 70 MMHG

## 2024-04-18 LAB
APTT BLD: 29.2 SEC — SIGNIFICANT CHANGE UP (ref 24.5–35.6)
INR BLD: 0.93 RATIO — SIGNIFICANT CHANGE UP (ref 0.85–1.18)
PROTHROM AB SERPL-ACNC: 10.5 SEC — SIGNIFICANT CHANGE UP (ref 9.5–13)

## 2024-04-18 PROCEDURE — G2211 COMPLEX E/M VISIT ADD ON: CPT | Mod: NC,1L

## 2024-04-18 PROCEDURE — 99214 OFFICE O/P EST MOD 30 MIN: CPT

## 2024-04-18 NOTE — HISTORY OF PRESENT ILLNESS
[Disease: _____________________] : Disease: [unfilled] [T: ___] : T[unfilled] [N: ___] : N[unfilled] [M: ___] : M[unfilled] [AJCC Stage: ____] : AJCC Stage: [unfilled] [100: Normal, no complaints, no evidence of disease.] : 100: Normal, no complaints, no evidence of disease. [ECOG Performance Status: 0 - Fully active, able to carry on all pre-disease performance without restriction] : Performance Status: 0 - Fully active, able to carry on all pre-disease performance without restriction

## 2024-04-18 NOTE — REVIEW OF SYSTEMS
[Night Sweats] : night sweats [Diarrhea: Grade 0] : Diarrhea: Grade 0 [Hot Flashes] : hot flashes [Negative] : Psychiatric

## 2024-05-02 ENCOUNTER — APPOINTMENT (OUTPATIENT)
Dept: ULTRASOUND IMAGING | Facility: CLINIC | Age: 54
End: 2024-05-02
Payer: COMMERCIAL

## 2024-05-02 PROCEDURE — 93971 EXTREMITY STUDY: CPT | Mod: RT

## 2024-05-08 ENCOUNTER — RESULT REVIEW (OUTPATIENT)
Age: 54
End: 2024-05-08

## 2024-05-08 ENCOUNTER — APPOINTMENT (OUTPATIENT)
Dept: HEMATOLOGY ONCOLOGY | Facility: CLINIC | Age: 54
End: 2024-05-08

## 2024-05-08 ENCOUNTER — APPOINTMENT (OUTPATIENT)
Dept: ULTRASOUND IMAGING | Facility: CLINIC | Age: 54
End: 2024-05-08

## 2024-05-08 ENCOUNTER — APPOINTMENT (OUTPATIENT)
Dept: INTERVENTIONAL RADIOLOGY/VASCULAR | Facility: HOSPITAL | Age: 54
End: 2024-05-08

## 2024-05-08 LAB
BASOPHILS # BLD AUTO: 0.08 K/UL — SIGNIFICANT CHANGE UP (ref 0–0.2)
BASOPHILS NFR BLD AUTO: 1.8 % — SIGNIFICANT CHANGE UP (ref 0–2)
EOSINOPHIL # BLD AUTO: 0.12 K/UL — SIGNIFICANT CHANGE UP (ref 0–0.5)
EOSINOPHIL NFR BLD AUTO: 2.7 % — SIGNIFICANT CHANGE UP (ref 0–6)
HCT VFR BLD CALC: 38 % — SIGNIFICANT CHANGE UP (ref 34.5–45)
HGB BLD-MCNC: 13.4 G/DL — SIGNIFICANT CHANGE UP (ref 11.5–15.5)
IMM GRANULOCYTES NFR BLD AUTO: 0.7 % — SIGNIFICANT CHANGE UP (ref 0–0.9)
LYMPHOCYTES # BLD AUTO: 0.84 K/UL — LOW (ref 1–3.3)
LYMPHOCYTES # BLD AUTO: 19 % — SIGNIFICANT CHANGE UP (ref 13–44)
MCHC RBC-ENTMCNC: 29.5 PG — SIGNIFICANT CHANGE UP (ref 27–34)
MCHC RBC-ENTMCNC: 35.3 G/DL — SIGNIFICANT CHANGE UP (ref 32–36)
MCV RBC AUTO: 83.5 FL — SIGNIFICANT CHANGE UP (ref 80–100)
MONOCYTES # BLD AUTO: 0.37 K/UL — SIGNIFICANT CHANGE UP (ref 0–0.9)
MONOCYTES NFR BLD AUTO: 8.4 % — SIGNIFICANT CHANGE UP (ref 2–14)
NEUTROPHILS # BLD AUTO: 2.99 K/UL — SIGNIFICANT CHANGE UP (ref 1.8–7.4)
NEUTROPHILS NFR BLD AUTO: 67.4 % — SIGNIFICANT CHANGE UP (ref 43–77)
NRBC # BLD: 0 /100 WBCS — SIGNIFICANT CHANGE UP (ref 0–0)
PLATELET # BLD AUTO: 193 K/UL — SIGNIFICANT CHANGE UP (ref 150–400)
RBC # BLD: 4.55 M/UL — SIGNIFICANT CHANGE UP (ref 3.8–5.2)
RBC # FLD: 12.6 % — SIGNIFICANT CHANGE UP (ref 10.3–14.5)
WBC # BLD: 4.43 K/UL — SIGNIFICANT CHANGE UP (ref 3.8–10.5)
WBC # FLD AUTO: 4.43 K/UL — SIGNIFICANT CHANGE UP (ref 3.8–10.5)

## 2024-05-09 ENCOUNTER — RESULT REVIEW (OUTPATIENT)
Age: 54
End: 2024-05-09

## 2024-05-09 ENCOUNTER — OUTPATIENT (OUTPATIENT)
Dept: OUTPATIENT SERVICES | Facility: HOSPITAL | Age: 54
LOS: 1 days | End: 2024-05-09
Payer: COMMERCIAL

## 2024-05-09 ENCOUNTER — TRANSCRIPTION ENCOUNTER (OUTPATIENT)
Age: 54
End: 2024-05-09

## 2024-05-09 VITALS
HEART RATE: 73 BPM | RESPIRATION RATE: 15 BRPM | OXYGEN SATURATION: 98 % | WEIGHT: 186.95 LBS | DIASTOLIC BLOOD PRESSURE: 58 MMHG | TEMPERATURE: 98 F | HEIGHT: 65 IN | SYSTOLIC BLOOD PRESSURE: 121 MMHG

## 2024-05-09 VITALS
RESPIRATION RATE: 12 BRPM | SYSTOLIC BLOOD PRESSURE: 107 MMHG | HEART RATE: 69 BPM | DIASTOLIC BLOOD PRESSURE: 54 MMHG | OXYGEN SATURATION: 99 %

## 2024-05-09 DIAGNOSIS — Z98.890 OTHER SPECIFIED POSTPROCEDURAL STATES: Chronic | ICD-10-CM

## 2024-05-09 DIAGNOSIS — Z17.0 ESTROGEN RECEPTOR POSITIVE STATUS [ER+]: ICD-10-CM

## 2024-05-09 DIAGNOSIS — C50.911 MALIGNANT NEOPLASM OF UNSPECIFIED SITE OF RIGHT FEMALE BREAST: ICD-10-CM

## 2024-05-09 PROBLEM — Z90.13 ABSENCE OF BREAST, ACQUIRED, BILATERAL: Status: ACTIVE | Noted: 2023-11-10

## 2024-05-09 PROCEDURE — 36590 REMOVAL TUNNELED CV CATH: CPT

## 2024-05-09 PROCEDURE — 77001 FLUOROGUIDE FOR VEIN DEVICE: CPT

## 2024-05-09 PROCEDURE — 77001 FLUOROGUIDE FOR VEIN DEVICE: CPT | Mod: 26

## 2024-05-09 RX ORDER — ANASTROZOLE 1 MG/1
1 TABLET ORAL
Refills: 0 | DISCHARGE

## 2024-05-09 NOTE — ASU DISCHARGE PLAN (ADULT/PEDIATRIC) - NURSING INSTRUCTIONS
Please feel free to contact us at [phone] if any problems arise. After 6PM, Monday through Friday, on weekends and on holidays, please call [phone] and ask for the radiology resident on call to be paged.

## 2024-05-09 NOTE — PRE-ANESTHESIA EVALUATION ADULT - NSPROPOSEDPROCEDFT_GEN_ALL_CORE
Called pt and reviewed XR results from 2/4/20.  Pt stated pain in neck is manageable with the muscle relaxants taking BID and took tramadol 4 or 5 times.  Instructed pt to call office if pain severe as can enter referral for pain clinic for epidural injections.  Reminded pt of appt with Dr. Goldsmith on 2/17/20.   Chest Port Removal

## 2024-05-09 NOTE — ASU DISCHARGE PLAN (ADULT/PEDIATRIC) - ***IN THE EVENT THAT YOU DEVELOP A COMPLICATION AND YOU ARE UNABLE TO REACH YOUR OWN PHYSICIAN, YOU MAY CONTACT:
Wrist Watch/Cell Phone/PDA (specify)/Jewelry/Money (specify)/Other belongings/Clothing Statement Selected

## 2024-05-10 ENCOUNTER — APPOINTMENT (OUTPATIENT)
Dept: PLASTIC SURGERY | Facility: CLINIC | Age: 54
End: 2024-05-10
Payer: COMMERCIAL

## 2024-05-10 VITALS
RESPIRATION RATE: 16 BRPM | HEIGHT: 65 IN | BODY MASS INDEX: 31.49 KG/M2 | TEMPERATURE: 97.6 F | SYSTOLIC BLOOD PRESSURE: 113 MMHG | WEIGHT: 189 LBS | OXYGEN SATURATION: 98 % | HEART RATE: 79 BPM | DIASTOLIC BLOOD PRESSURE: 74 MMHG

## 2024-05-10 DIAGNOSIS — Z90.13 ACQUIRED ABSENCE OF BILATERAL BREASTS AND NIPPLES: ICD-10-CM

## 2024-05-10 PROCEDURE — 99214 OFFICE O/P EST MOD 30 MIN: CPT

## 2024-05-10 NOTE — HISTORY OF PRESENT ILLNESS
[FreeTextEntry1] : Patient s/p bilateral mastectomies and right axillary dissection, no LYMPHA, bilateral JENNIFFER flaps on 12/14/23. She presents today for routine follow up. She reports feeling well, despite having some mild lymphedema of her right UE. She also notes that her axillary tissue on the right side is "socked in." She finished radiation on the right in February.

## 2024-05-10 NOTE — PHYSICAL EXAM
[de-identified] : b/l breast flaps soft, nt. incisions well healed. right breast with edema and higher than the left s/p radiation. Right breast with edema out laterally. She pointed out a small telangectasia on the right medial breast that blanches. No mass. Left upper chest with port removal scar - intact. [de-identified] : abdomen soft, nt. no evidence of hernia. incision well healed, umbilicus remains viable and well healed. no erythema/warmth/dc. bilateral SCDs.  [de-identified] : FROM b/l UEs, right UE with mild lymphedema

## 2024-05-16 ENCOUNTER — OUTPATIENT (OUTPATIENT)
Dept: OUTPATIENT SERVICES | Facility: HOSPITAL | Age: 54
LOS: 1 days | Discharge: HOME | End: 2024-05-16

## 2024-05-16 DIAGNOSIS — C50.919 MALIGNANT NEOPLASM OF UNSPECIFIED SITE OF UNSPECIFIED FEMALE BREAST: ICD-10-CM

## 2024-05-16 DIAGNOSIS — Z98.890 OTHER SPECIFIED POSTPROCEDURAL STATES: Chronic | ICD-10-CM

## 2024-05-21 DIAGNOSIS — C50.911 MALIGNANT NEOPLASM OF UNSPECIFIED SITE OF RIGHT FEMALE BREAST: ICD-10-CM

## 2024-05-21 DIAGNOSIS — Z45.2 ENCOUNTER FOR ADJUSTMENT AND MANAGEMENT OF VASCULAR ACCESS DEVICE: ICD-10-CM

## 2024-05-22 ENCOUNTER — NON-APPOINTMENT (OUTPATIENT)
Age: 54
End: 2024-05-22

## 2024-05-23 ENCOUNTER — APPOINTMENT (OUTPATIENT)
Dept: HEMATOLOGY ONCOLOGY | Facility: CLINIC | Age: 54
End: 2024-05-23
Payer: COMMERCIAL

## 2024-05-23 ENCOUNTER — APPOINTMENT (OUTPATIENT)
Dept: INFUSION THERAPY | Facility: HOSPITAL | Age: 54
End: 2024-05-23

## 2024-05-23 VITALS
WEIGHT: 191.8 LBS | HEART RATE: 74 BPM | RESPIRATION RATE: 16 BRPM | TEMPERATURE: 97.4 F | DIASTOLIC BLOOD PRESSURE: 81 MMHG | SYSTOLIC BLOOD PRESSURE: 120 MMHG | BODY MASS INDEX: 31.92 KG/M2 | OXYGEN SATURATION: 98 %

## 2024-05-23 DIAGNOSIS — Z79.811 LONG TERM (CURRENT) USE OF AROMATASE INHIBITORS: ICD-10-CM

## 2024-05-23 DIAGNOSIS — Z79.818 LONG TERM (CURRENT) USE OF OTHER AGENTS AFFECTING ESTROGEN RECEPTORS AND ESTROGEN LEVELS: ICD-10-CM

## 2024-05-23 PROCEDURE — 99214 OFFICE O/P EST MOD 30 MIN: CPT

## 2024-05-23 NOTE — HISTORY OF PRESENT ILLNESS
[Disease: _____________________] : Disease: [unfilled] [T: ___] : T[unfilled] [N: ___] : N[unfilled] [M: ___] : M[unfilled] [AJCC Stage: ____] : AJCC Stage: [unfilled] [100: Normal, no complaints, no evidence of disease.] : 100: Normal, no complaints, no evidence of disease. 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   She chose not to proceed with a biopsy.  She had 2 "thermography tests" done, one at a center in [place], and one at a center in [place], the patient verbally reports that these returned "negative."  She was then seen by a naturopath who prescribed various therapies, as well as essential oils per her description.  Approximately 1 month later, she presented for a repeat mammogram and sonogram at the [facility] in [place] - August 2022, a sonogram performed at that center showed in the right breast multiple echo poor avascular foci with a 7 x 8 x 10 millimeters solid mass with micro calculi and slightly decreased M-mode through transmission; a left breast sonogram showed fibrocystic disease.  Biopsy and MRI were recommended per outside note.  The patient reports that she chose not to proceed with an MRI or further biopsy.  She followed with an integrative medicine practitioner taking mistletoe injections into her right breast as well as ozone therapy (Dr. Naidu).  She repeatedly declined MRI imaging and due to no further change she opted to forgo any additional therapies.  She then presented for a bilateral mammogram and sonogram at [facility] on 06/28/2023  .  - right bresat approximate 3 centimeter spiculated mass with associated heterogeneous calcifications, apparent architectural distortion extending in a linear fashion to a point approximately 0.5 cmFN suspicious for ductal extension of disease; -  the right upper outer breast, posterior third, ~12cmFN, there was approximately 1 centimeter grouping of heterogeneous calcifications suspicious in appearance; there were no mammographic suspicious findings in the left breast;  ultrasound of the right breast demonstrated at the 10 o'clock axis, 8cmFN a 2.5 x 2.7 x 2.0 centimeter irregular hypoechoic mass, highly suspicious for malignancy ; there was an enlarged right axillary lymph node suspicious for lymph node metastases with ultrasound guided core biopsy recommended.   Bilateral breast MRI performed on 06/26/2023 with a finding in the right breast of confluent heterogeneous enhancing masses measuring 4.0 x 1.7 x 2.0 centimeters present in the upper outer breast middle depth at 10-11 o'clock as well as multiple regional clumped and heterogeneous non mass enhancement throughout the breast extending to the nipple areolar complex consistent with multifocal/multicentric malignancy; the left breast did not show any evidence of suspicious finding; there was an abnormal right axillary lymph node measuring 2 centimeters demonstrating diffuse thickened cortex consistent with metastatic adenopathy.    Biopsy - invasive ductal carcinoma ER >90%, WA >90%, HER2 neg (IHC 1+); referred for surgical evaluation.   Staging PET-CT scan on 07/05/2023 with a finding of FDG avid right breast cancer with right axillary lymph node metastasis and no distant metastasis.  Of note; there was uptake in bilateral adnexa and endometrium, which was felt to be possibly physiologic in a premenopausal patient of note.    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  Diagnosed with Stage IIA Breast cancer (clinical T3 N1 M0)  S/p dd AC X4 followd by vivian TaxolX4 with growth factor support which she completed on 11/8/23.  S/p B/L MRMs with Dr. Mallory with JENNIFFER reconstruction on 12/15/23 with Dr. Joss Evans Final Path showed 2.4 cm residual IDC, mod diff, 4+/ 24 AXLNs and 0/1 R IMLN, ER+ (95%) WA+(20% weak staining) and Her 2 josé neg (0-1+) ; had perinodal extension an done margin <1 MM skeletal muscle; for which she was referred for Radiation Oncology.  [de-identified] : ER+ 95%, SC weak 20%, Her2 negative [de-identified] : Patient returns today to rule out progression or recurrence of breast cancer and to assess treatment toxicity. She received adjuvant post-mastectomy radiation therapy from 2/5/2024 through 2/27/2024.  Currently on monthly lupron started 1/25/2024 and anastrozole, started end of 3/2024.    C/o occasional mild arthralgias if sitting or lying down for long periods of time, improves with movement.  Has occasional HF, baseline preAI, no change, tolerable per patient.   She has started right arm lymphedema therapy.  Is wearing compression sleeve.    She states her back pain has improved.   She denies any other complaints. She states overall she is doing well.  She states she is feeling better since having port removed.  She recently started exercising.   LMP 7/16/23  DEXA, 4/3/2024- normal bone density

## 2024-05-23 NOTE — REVIEW OF SYSTEMS
[Night Sweats] : night sweats [Diarrhea: Grade 0] : Diarrhea: Grade 0 [Hot Flashes] : hot flashes [Negative] : Musculoskeletal [de-identified] : hair thinning

## 2024-05-23 NOTE — PHYSICAL EXAM
[Restricted in physically strenuous activity but ambulatory and able to carry out work of a light or sedentary nature] : Status 1- Restricted in physically strenuous activity but ambulatory and able to carry out work of a light or sedentary nature, e.g., light house work, office work [Normal] : full range of motion and no deformities appreciated [de-identified] : bilateral mastectomy with with well healed surgical scars, no discrete palapble masses, no palpable axillary nodes.  [de-identified] : well healed JENNIFFER flap scars [de-identified] : patient wearign right arm lymphedema sleeve

## 2024-05-23 NOTE — ASSESSMENT
[FreeTextEntry1] : 52 yo woman with clinical Stage IIA, pathological Stage IIIA (T2N2, ER+NE weak, Her2 negative), moderately differentiated right breast cancer s/p neoadjuvant ddAC/ddT, s/p bilateral mastectomy with JENNIFFER flap and due to close margins and 4+ LN, undergoing right breast post mastectomy RT  - continue with Lupron injection monthly; injection #5 today, will likely continue Lupron until at least 7/2024 (1 year without menstrual cycle as she stopped having periods in 7/2023), discussed again with patient.  -started anastrozole 1 mg daily, tolerating well.   -bone density reviewed with patient -Will prescribe abemaciclib 150mg BID today for 2-year period due to further reduction of risk of recurrence by about 8%, discussed again with patient who agreed to proceed with treatment.  Potential risks, benefits and side effects were discussed to the patient.  She is advised to have Imodium at home and to take if experiencing diarrhea.  She is to call if she experiences diarrhea 4-5 x/day.  She is also advised to maintain her fluid intake.  - Discussed again the addition of zoledronic acid 4mg every 6 months for period of 3 years to further reduce the risk of bone metastases in patient with ER+ disease. The patient does not wish to proceed with this recommendation.   - previously reviewed her germline genetic testing with [facility]; genetics consult appreciated.  - Patient had the opportunity to have all their questions answered to their satisfaction - f/u 1 month as scheduled

## 2024-05-24 DIAGNOSIS — Z51.11 ENCOUNTER FOR ANTINEOPLASTIC CHEMOTHERAPY: ICD-10-CM

## 2024-05-29 ENCOUNTER — APPOINTMENT (OUTPATIENT)
Dept: BREAST CENTER | Facility: CLINIC | Age: 54
End: 2024-05-29
Payer: COMMERCIAL

## 2024-05-29 VITALS
HEIGHT: 65 IN | HEART RATE: 75 BPM | DIASTOLIC BLOOD PRESSURE: 81 MMHG | BODY MASS INDEX: 31.82 KG/M2 | SYSTOLIC BLOOD PRESSURE: 133 MMHG | WEIGHT: 191 LBS

## 2024-05-29 DIAGNOSIS — Z17.0 MALIGNANT NEOPLASM OF OVERLAPPING SITES OF RIGHT FEMALE BREAST: ICD-10-CM

## 2024-05-29 DIAGNOSIS — C50.811 MALIGNANT NEOPLASM OF OVERLAPPING SITES OF RIGHT FEMALE BREAST: ICD-10-CM

## 2024-05-29 PROCEDURE — 99213 OFFICE O/P EST LOW 20 MIN: CPT

## 2024-05-29 RX ORDER — ABEMACICLIB 150 MG/1
150 TABLET ORAL
Qty: 60 | Refills: 6 | Status: DISCONTINUED | COMMUNITY
Start: 2024-05-23 | End: 2024-05-29

## 2024-05-29 RX ORDER — PROCHLORPERAZINE MALEATE 10 MG/1
10 TABLET ORAL EVERY 6 HOURS
Qty: 30 | Refills: 1 | Status: DISCONTINUED | COMMUNITY
Start: 2023-07-20 | End: 2024-05-29

## 2024-05-29 NOTE — HISTORY OF PRESENT ILLNESS
[FreeTextEntry1] : This is a 54 year old premenopausal female who was seen in July 2023.  She was found to have an abnormality on a routine right breast ultrasound in 7/2022 for which a biopsy was advised.  She decided to use massage and essential oils and even had an injection of mistletoe into her breast.  She followed with ultrasounds by Dr. Phan in [place]  and thermograms and saw a breast surgeon, Dr. Coronel and was told it remained stable.  She had a followup mammogram and ultrasound in July 2023 and there were suspicious masses in the right breast with a suspicious axillary node.  Biopsies of two sites in the right breast showed invasive ductal cancer 10N8 ER+MO+Her 2 FISH amplified, KI 67 30% and 10N10 ER+MO+Her 2 1+ negative.  Biopsy of the right axillary node showed cancer also, ER+MO+Her 2 nonamplified, KI 67 20%. The Her 2 was repeated on the initially positive site and found to be negative.  She met with Dr. Chan and chemotherapy was initiated.  She completed ACx4 every 2 weeks followed by weekly  Paclitaxel x12. Her last chemo was on Nov 8 2023.   She underwent bilateral mastectomies with right sentinel node biopsy/axillary dissection and JENNIFFER reconstruction in conjunction with Dr. Evans on Dec 14, 2023.  Pathology showed residual cancer in the right breast with 4/25 positive nodes with extracapsular extension.  A LYMPHA attempt was not successful.    She completed radiation therapy.  She is on anastrozole and Lupron and will be starting abemaciclib.  She had some tingling in her fingers and slight swelling in the morning.  She was told she has very early lymphedema.  She is doing physical therapy and has a sleeve.

## 2024-05-29 NOTE — DATA REVIEWED
[FreeTextEntry1] :  Pathology 6/28/2023  Right breast axilla (Q clip)= metastatic carcinoma in lymph node, ER >90% OH 80%, Her 2 1-2+ FISH nonamplified, Ki 67 20%                                   Right breast 10:00N10 (Ribbon clip)= infiltrating ductal carcinoma, mod diff with DCIS, ER>90% OH>90% Her 2 1+, Ki 67 15%                                   Right breast 10:00 (RA)(Hourglass clip)= benign breast tissue with fibroadenomatoid change                                  Right breast 10N8 (coil clip)= infiltrating ductal carcinoma, mod diff, ER >90% OH 90% Her 2 2+ FISH amplified  A post-biopsy mammogram was not done.                   PET/CT 7/5/2023:  FDG avid right breast and axillary node mets. Right axillary node 26x16 mm and additional node more medial 7x6 mm,  No distant disease.

## 2024-05-29 NOTE — PHYSICAL EXAM
[de-identified] : Incision clean, healing well. [EOMI] : extra ocular movement intact [Sclera nonicteric] : sclera nonicteric [Supple] : supple [No Supraclavicular Adenopathy] : no supraclavicular adenopathy [No Cervical Adenopathy] : no cervical adenopathy [No Thyromegaly] : no thyromegaly [Clear to Auscultation Bilat] : clear to auscultation bilaterally [Examined in the supine and seated position] : examined in the supine and seated position [No dominant masses] : no dominant masses in right breast  [No dominant masses] : no dominant masses left breast [No Axillary Lymphadenopathy] : no left axillary lymphadenopathy [Soft] : abdomen soft [Not Tender] : non-tender [de-identified] : Circular skin island. [de-identified] : Circular skin island. [de-identified] : Scar.

## 2024-05-29 NOTE — PAST MEDICAL HISTORY
[Menarche Age ____] : age at menarche was [unfilled] [Menopause Age____] : age at menopause was [unfilled] [Total Preg ___] : G[unfilled] [Live Births ___] : P[unfilled]  [Age At Live Birth ___] : Age at live birth: [unfilled] [de-identified] : [phone] [FreeTextEntry7] : no [FreeTextEntry8] : 5months

## 2024-06-14 ENCOUNTER — APPOINTMENT (OUTPATIENT)
Dept: HEMATOLOGY ONCOLOGY | Facility: CLINIC | Age: 54
End: 2024-06-14
Payer: COMMERCIAL

## 2024-06-14 VITALS
OXYGEN SATURATION: 97 % | TEMPERATURE: 98.1 F | BODY MASS INDEX: 32.1 KG/M2 | WEIGHT: 192.88 LBS | HEART RATE: 70 BPM | SYSTOLIC BLOOD PRESSURE: 103 MMHG | RESPIRATION RATE: 16 BRPM | DIASTOLIC BLOOD PRESSURE: 68 MMHG

## 2024-06-14 DIAGNOSIS — C50.911 MALIGNANT NEOPLASM OF UNSPECIFIED SITE OF RIGHT FEMALE BREAST: ICD-10-CM

## 2024-06-14 DIAGNOSIS — Z17.0 MALIGNANT NEOPLASM OF UNSPECIFIED SITE OF RIGHT FEMALE BREAST: ICD-10-CM

## 2024-06-14 DIAGNOSIS — Z15.09 GENETIC SUSCEPTIBILITY TO OTHER DISEASE: ICD-10-CM

## 2024-06-14 DIAGNOSIS — Z15.89 GENETIC SUSCEPTIBILITY TO OTHER DISEASE: ICD-10-CM

## 2024-06-14 PROCEDURE — G2211 COMPLEX E/M VISIT ADD ON: CPT | Mod: NC,1L

## 2024-06-14 PROCEDURE — 99215 OFFICE O/P EST HI 40 MIN: CPT

## 2024-06-14 RX ORDER — ABEMACICLIB 150 MG/1
150 TABLET ORAL
Qty: 60 | Refills: 6 | Status: ACTIVE | COMMUNITY
Start: 2024-06-14 | End: 1900-01-01

## 2024-06-16 PROBLEM — Z15.89 MONOALLELIC MUTATION OF APC GENE: Status: ACTIVE | Noted: 2018-09-05

## 2024-06-16 PROBLEM — C50.911 MALIGNANT NEOPLASM OF RIGHT BREAST IN FEMALE, ESTROGEN RECEPTOR POSITIVE, UNSPECIFIED SITE OF BREAST: Status: ACTIVE | Noted: 2023-07-11

## 2024-06-16 NOTE — ASSESSMENT
[FreeTextEntry1] : 53 yo woman with clinical Stage IIA, pathological Stage IIIA (T2N2, ER+KY weak, Her2 negative), moderately differentiated right breast cancer s/p neoadjuvant ddAC/ddT, s/p bilateral mastectomy with JENNIFFER flap and due to close margins and 4+ LN, undergoing right breast post mastectomy RT  - continue with Lupron injection monthly; injection #6 today, will continue Lupron until at least 7/2024 (1 year without menstrual cycle as she stopped having periods in 7/2023),Will stop at that point and will check Estradiol (Ultrasensitive) level to ensure she remains post-menopausal -started anastrozole 1 mg daily, tolerating well.   -bone density reviewed with patient - abemaciclib 150mg BID prescribed at last visit but she has not started yet. Discussed with patient that treatment with abemaciclib is recommended for 2-year period due to further reduction of risk of recurrence by about 8%, Discussed again with patient who agreed to proceed with treatment.  Potential risks, benefits and side effects were discussed with the patient.  She is advised to have Imodium at home and to take if experiencing diarrhea.  She is to call if she experiences diarrhea 4-5 x/day.  She is also advised to maintain her fluid intake.  - Discussed again the addition of zoledronic acid 4mg every 6 months for period of 3 years to further reduce the risk of bone metastases in patient with ER+ disease. She will reconsider this. - previously reviewed her germline genetic testing with [facility]; genetics consult appreciated. - had extensive discussion with patient regarding [employer] ctDNA testing and very little data in it's use for monitoring of breast cancer recurrence; patient understands this.  - Patient had the opportunity to have all their questions answered to their satisfaction - f/u 1 month as scheduled

## 2024-06-16 NOTE — REVIEW OF SYSTEMS
[Night Sweats] : night sweats [Diarrhea: Grade 0] : Diarrhea: Grade 0 [Hot Flashes] : hot flashes [Negative] : Constitutional [de-identified] : hair thinning

## 2024-06-16 NOTE — HISTORY OF PRESENT ILLNESS
[Disease: _____________________] : Disease: [unfilled] [T: ___] : T[unfilled] [N: ___] : N[unfilled] [M: ___] : M[unfilled] [AJCC Stage: ____] : AJCC Stage: [unfilled] [100: Normal, no complaints, no evidence of disease.] : 100: Normal, no complaints, no evidence of disease. 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   She chose not to proceed with a biopsy.  She had 2 "thermography tests" done, one at a center in [place], and one at a center in [place], the patient verbally reports that these returned "negative."  She was then seen by a naturopath who prescribed various therapies, as well as essential oils per her description.  Approximately 1 month later, she presented for a repeat mammogram and sonogram at the [facility] in [place] - August 2022, a sonogram performed at that center showed in the right breast multiple echo poor avascular foci with a 7 x 8 x 10 millimeters solid mass with micro calculi and slightly decreased M-mode through transmission; a left breast sonogram showed fibrocystic disease.  Biopsy and MRI were recommended per outside note.  The patient reports that she chose not to proceed with an MRI or further biopsy.  She followed with an integrative medicine practitioner taking mistletoe injections into her right breast as well as ozone therapy (Dr. Naidu).  She repeatedly declined MRI imaging and due to no further change she opted to forgo any additional therapies.  She then presented for a bilateral mammogram and sonogram at [facility] on 06/28/2023  .  - right bresat approximate 3 centimeter spiculated mass with associated heterogeneous calcifications, apparent architectural distortion extending in a linear fashion to a point approximately 0.5 cmFN suspicious for ductal extension of disease; -  the right upper outer breast, posterior third, ~12cmFN, there was approximately 1 centimeter grouping of heterogeneous calcifications suspicious in appearance; there were no mammographic suspicious findings in the left breast;  ultrasound of the right breast demonstrated at the 10 o'clock axis, 8cmFN a 2.5 x 2.7 x 2.0 centimeter irregular hypoechoic mass, highly suspicious for malignancy ; there was an enlarged right axillary lymph node suspicious for lymph node metastases with ultrasound guided core biopsy recommended.   Bilateral breast MRI performed on 06/26/2023 with a finding in the right breast of confluent heterogeneous enhancing masses measuring 4.0 x 1.7 x 2.0 centimeters present in the upper outer breast middle depth at 10-11 o'clock as well as multiple regional clumped and heterogeneous non mass enhancement throughout the breast extending to the nipple areolar complex consistent with multifocal/multicentric malignancy; the left breast did not show any evidence of suspicious finding; there was an abnormal right axillary lymph node measuring 2 centimeters demonstrating diffuse thickened cortex consistent with metastatic adenopathy.    Biopsy - invasive ductal carcinoma ER >90%, SD >90%, HER2 neg (IHC 1+); referred for surgical evaluation.   Staging PET-CT scan on 07/05/2023 with a finding of FDG avid right breast cancer with right axillary lymph node metastasis and no distant metastasis.  Of note; there was uptake in bilateral adnexa and endometrium, which was felt to be possibly physiologic in a premenopausal patient of note.    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  Diagnosed with Stage IIA Breast cancer (clinical T3 N1 M0)  S/p dd AC X4 followd by vivian TaxolX4 with growth factor support which she completed on 11/8/23.  S/p B/L MRMs with Dr. Mallory with JENNIFFER reconstruction on 12/15/23 with Dr. Joss Evans Final Path showed 2.4 cm residual IDC, mod diff, 4+/ 24 AXLNs and 0/1 R IMLN, ER+ (95%) SD+(20% weak staining) and Her 2 josé neg (0-1+) ; had perinodal extension an done margin <1 MM skeletal muscle; for which she was referred for Radiation Oncology.  [de-identified] : ER+ 95%, TX weak 20%, Her2 negative [de-identified] : 6/14/24 Patient returns today to rule out progression or recurrence of breast cancer and to assess treatment toxicity. She received adjuvant post-mastectomy radiation therapy from 2/5/2024 through 2/27/2024.  Currently on monthly lupron started 1/25/2024 and anastrozole started 3/2024; port has been removed. Arthralgia intermittently - improves with activity has intermittently noted hot flushes but no changes compared to prior to AI Right arm lymphedema therapy ongoing Is anxious about recurrence of disease  LMP 7/16/23  DEXA, 4/3/2024- normal bone density

## 2024-06-16 NOTE — PHYSICAL EXAM
[Restricted in physically strenuous activity but ambulatory and able to carry out work of a light or sedentary nature] : Status 1- Restricted in physically strenuous activity but ambulatory and able to carry out work of a light or sedentary nature, e.g., light house work, office work [Normal] : affect appropriate [de-identified] : bilateral mastectomy with with well healed surgical scars, no discrete palapble masses, no palpable axillary nodes.  [de-identified] : well healed JENNIFFER flap scars [de-identified] : patient wearing right arm lymphedema sleeve

## 2024-06-20 ENCOUNTER — APPOINTMENT (OUTPATIENT)
Dept: INFUSION THERAPY | Facility: HOSPITAL | Age: 54
End: 2024-06-20

## 2024-06-24 ENCOUNTER — APPOINTMENT (OUTPATIENT)
Dept: INFUSION THERAPY | Facility: HOSPITAL | Age: 54
End: 2024-06-24

## 2024-07-19 ENCOUNTER — OUTPATIENT (OUTPATIENT)
Dept: OUTPATIENT SERVICES | Facility: HOSPITAL | Age: 54
LOS: 1 days | Discharge: HOME | End: 2024-07-19

## 2024-07-19 DIAGNOSIS — Z98.890 OTHER SPECIFIED POSTPROCEDURAL STATES: Chronic | ICD-10-CM

## 2024-07-19 DIAGNOSIS — C50.919 MALIGNANT NEOPLASM OF UNSPECIFIED SITE OF UNSPECIFIED FEMALE BREAST: ICD-10-CM

## 2024-07-25 ENCOUNTER — APPOINTMENT (OUTPATIENT)
Dept: INFUSION THERAPY | Facility: HOSPITAL | Age: 54
End: 2024-07-25

## 2024-07-26 DIAGNOSIS — Z51.11 ENCOUNTER FOR ANTINEOPLASTIC CHEMOTHERAPY: ICD-10-CM

## 2024-08-15 ENCOUNTER — APPOINTMENT (OUTPATIENT)
Dept: RADIATION ONCOLOGY | Facility: CLINIC | Age: 54
End: 2024-08-15
Payer: COMMERCIAL

## 2024-08-15 ENCOUNTER — APPOINTMENT (OUTPATIENT)
Dept: PLASTIC SURGERY | Facility: CLINIC | Age: 54
End: 2024-08-15

## 2024-08-15 VITALS
BODY MASS INDEX: 31.65 KG/M2 | HEIGHT: 65 IN | RESPIRATION RATE: 17 BRPM | WEIGHT: 190 LBS | DIASTOLIC BLOOD PRESSURE: 83 MMHG | TEMPERATURE: 97.16 F | OXYGEN SATURATION: 99 % | HEART RATE: 83 BPM | SYSTOLIC BLOOD PRESSURE: 129 MMHG

## 2024-08-15 VITALS
SYSTOLIC BLOOD PRESSURE: 118 MMHG | WEIGHT: 190 LBS | BODY MASS INDEX: 31.65 KG/M2 | DIASTOLIC BLOOD PRESSURE: 78 MMHG | HEART RATE: 84 BPM | TEMPERATURE: 97.8 F | RESPIRATION RATE: 16 BRPM | HEIGHT: 65 IN | OXYGEN SATURATION: 99 %

## 2024-08-15 DIAGNOSIS — C50.911 MALIGNANT NEOPLASM OF UNSPECIFIED SITE OF RIGHT FEMALE BREAST: ICD-10-CM

## 2024-08-15 DIAGNOSIS — Z90.13 ACQUIRED ABSENCE OF BILATERAL BREASTS AND NIPPLES: ICD-10-CM

## 2024-08-15 DIAGNOSIS — Z17.0 MALIGNANT NEOPLASM OF OVERLAPPING SITES OF RIGHT FEMALE BREAST: ICD-10-CM

## 2024-08-15 DIAGNOSIS — Z17.0 MALIGNANT NEOPLASM OF UNSPECIFIED SITE OF RIGHT FEMALE BREAST: ICD-10-CM

## 2024-08-15 DIAGNOSIS — C50.811 MALIGNANT NEOPLASM OF OVERLAPPING SITES OF RIGHT FEMALE BREAST: ICD-10-CM

## 2024-08-15 PROCEDURE — 99212 OFFICE O/P EST SF 10 MIN: CPT

## 2024-08-15 PROCEDURE — 99212 OFFICE O/P EST SF 10 MIN: CPT | Mod: GC

## 2024-08-15 NOTE — PHYSICAL EXAM
[] : no respiratory distress [Heart Rate And Rhythm] : heart rate and rhythm were normal [Bowel Sounds] : normal bowel sounds [Abdomen Tenderness] : non-tender [Range of Motion to Joints] : range of motion to joints [Nail Clubbing] : no clubbing  or cyanosis of the fingernails [Sclera] : the sclera and conjunctiva were normal [Normal] : no palpable adenopathy [Supraclavicular Lymph Nodes Enlarged Bilaterally] : supraclavicular [Axillary Lymph Nodes Enlarged Bilaterally] : axillary [de-identified] : surgically absent breasts bilatreally with JENNIFFER flap reconstruction, right side with mild fibrosis and superior contraction, minimal residual hyperpigmentation within axillary region, 1-2mm excoriated skin at around 6:00

## 2024-08-15 NOTE — HISTORY OF PRESENT ILLNESS
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 She underwent neoadjuvant chemotherapy, followed by bilateral mastectomies with JENNIFFER reconstruction showing [id number] residual disease. She then received adjuvant post-mastectomy radiation therapy from 2/5/2024 through 2/27/2024, a total dose of 4240cGy to the right chest wall and lymph nodes. She presents today for post treatment evaluation.  The patient reports feeling generally well. She is involved in her usual activities. She denies cough or shortness of breath. She developed a pulling sensation in the right forearm since yesterday associated with wrist movement. She also noted a small amount of fluid from the skin of the right reconstructed breast. She wears a compression sleeve and gauntlet at night, and she also uses the pneumatic compression pump. These have helped and she notes less swelling. She notes intermittent firmness in the right axilla, comes and goes, improved when using pump. She has been using the right arm normally, no limitations. She continues Luron and anastrozole. She recently started abemaciclib.

## 2024-08-15 NOTE — PHYSICAL EXAM
[] : no respiratory distress [Heart Rate And Rhythm] : heart rate and rhythm were normal [Bowel Sounds] : normal bowel sounds [Abdomen Tenderness] : non-tender [Range of Motion to Joints] : range of motion to joints [Nail Clubbing] : no clubbing  or cyanosis of the fingernails [Sclera] : the sclera and conjunctiva were normal [Normal] : no palpable adenopathy [Supraclavicular Lymph Nodes Enlarged Bilaterally] : supraclavicular [Axillary Lymph Nodes Enlarged Bilaterally] : axillary [de-identified] : surgically absent breasts bilatreally with JENNIFFER flap reconstruction, right side with mild fibrosis and superior contraction, minimal residual hyperpigmentation within axillary region, 1-2mm excoriated skin at around 6:00

## 2024-08-15 NOTE — HISTORY OF PRESENT ILLNESS
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 She underwent neoadjuvant chemotherapy, followed by bilateral mastectomies with JENNIFFER reconstruction showing [id number] residual disease. She then received adjuvant post-mastectomy radiation therapy from 2/5/2024 through 2/27/2024, a total dose of 4240cGy to the right chest wall and lymph nodes. She presents today for post treatment evaluation.  The patient reports feeling generally well. She is involved in her usual activities. She denies cough or shortness of breath. She developed a pulling sensation in the right forearm since yesterday associated with wrist movement. She also noted a small amount of fluid from the skin of the right reconstructed breast. She wears a compression sleeve and gauntlet at night, and she also uses the pneumatic compression pump. These have helped and she notes less swelling. She notes intermittent firmness in the right axilla, comes and goes, improved when using pump. She has been using the right arm normally, no limitations. She continues Luron and anastrozole. She recently started abemaciclib.

## 2024-08-16 ENCOUNTER — APPOINTMENT (OUTPATIENT)
Dept: HEMATOLOGY ONCOLOGY | Facility: CLINIC | Age: 54
End: 2024-08-16

## 2024-08-16 NOTE — PHYSICAL EXAM
[de-identified] : b/l breasts soft, nt. incisions well healed. good volume. right breast with superficial lesion, no erythema/warmth/discharge appreciated today. no wound. no tissue changes.

## 2024-08-16 NOTE — HISTORY OF PRESENT ILLNESS
[FreeTextEntry1] : 54 y.o. F s/p bilateral mastectomies and right axillary dissection, no LYMPHA, bilateral JENNIFFER flaps on 12/14/23 s/p completed RT 2/2024 and she presents today reporting she experienced a skin lesion with discharge. She admits going on vacation with some humidity and feels as though she developed a pimple on her skin that ruptured and she experienced yellowish discharge. No issues since that incident.

## 2024-08-16 NOTE — PHYSICAL EXAM
[de-identified] : b/l breasts soft, nt. incisions well healed. good volume. right breast with superficial lesion, no erythema/warmth/discharge appreciated today. no wound. no tissue changes.

## 2024-09-13 ENCOUNTER — OUTPATIENT (OUTPATIENT)
Dept: OUTPATIENT SERVICES | Facility: HOSPITAL | Age: 54
LOS: 1 days | Discharge: HOME | End: 2024-09-13

## 2024-09-13 DIAGNOSIS — C50.919 MALIGNANT NEOPLASM OF UNSPECIFIED SITE OF UNSPECIFIED FEMALE BREAST: ICD-10-CM

## 2024-09-13 DIAGNOSIS — Z98.890 OTHER SPECIFIED POSTPROCEDURAL STATES: Chronic | ICD-10-CM

## 2024-09-19 ENCOUNTER — OUTPATIENT (OUTPATIENT)
Dept: OUTPATIENT SERVICES | Facility: HOSPITAL | Age: 54
LOS: 1 days | End: 2024-09-19
Payer: COMMERCIAL

## 2024-09-19 ENCOUNTER — APPOINTMENT (OUTPATIENT)
Dept: ULTRASOUND IMAGING | Facility: CLINIC | Age: 54
End: 2024-09-19
Payer: COMMERCIAL

## 2024-09-19 DIAGNOSIS — Z98.890 OTHER SPECIFIED POSTPROCEDURAL STATES: Chronic | ICD-10-CM

## 2024-09-19 DIAGNOSIS — Z00.00 ENCOUNTER FOR GENERAL ADULT MEDICAL EXAMINATION WITHOUT ABNORMAL FINDINGS: ICD-10-CM

## 2024-09-19 PROCEDURE — 76830 TRANSVAGINAL US NON-OB: CPT | Mod: 26

## 2024-09-19 PROCEDURE — 76856 US EXAM PELVIC COMPLETE: CPT | Mod: 26

## 2024-09-19 PROCEDURE — 76830 TRANSVAGINAL US NON-OB: CPT

## 2024-09-19 PROCEDURE — 76856 US EXAM PELVIC COMPLETE: CPT

## 2024-09-20 ENCOUNTER — RESULT REVIEW (OUTPATIENT)
Age: 54
End: 2024-09-20

## 2024-09-20 ENCOUNTER — APPOINTMENT (OUTPATIENT)
Dept: HEMATOLOGY ONCOLOGY | Facility: CLINIC | Age: 54
End: 2024-09-20
Payer: COMMERCIAL

## 2024-09-20 ENCOUNTER — LABORATORY RESULT (OUTPATIENT)
Age: 54
End: 2024-09-20

## 2024-09-20 VITALS
SYSTOLIC BLOOD PRESSURE: 105 MMHG | DIASTOLIC BLOOD PRESSURE: 71 MMHG | RESPIRATION RATE: 16 BRPM | OXYGEN SATURATION: 99 % | HEART RATE: 78 BPM | TEMPERATURE: 97.1 F | BODY MASS INDEX: 31.92 KG/M2 | WEIGHT: 191.8 LBS

## 2024-09-20 DIAGNOSIS — C50.911 MALIGNANT NEOPLASM OF UNSPECIFIED SITE OF RIGHT FEMALE BREAST: ICD-10-CM

## 2024-09-20 DIAGNOSIS — Z79.899 OTHER LONG TERM (CURRENT) DRUG THERAPY: ICD-10-CM

## 2024-09-20 DIAGNOSIS — Z79.811 LONG TERM (CURRENT) USE OF AROMATASE INHIBITORS: ICD-10-CM

## 2024-09-20 DIAGNOSIS — Z17.0 MALIGNANT NEOPLASM OF UNSPECIFIED SITE OF RIGHT FEMALE BREAST: ICD-10-CM

## 2024-09-20 LAB
BASOPHILS # BLD AUTO: 0.1 K/UL — SIGNIFICANT CHANGE UP (ref 0–0.2)
BASOPHILS NFR BLD AUTO: 3 % — HIGH (ref 0–2)
EOSINOPHIL # BLD AUTO: 0.07 K/UL — SIGNIFICANT CHANGE UP (ref 0–0.5)
EOSINOPHIL NFR BLD AUTO: 2.1 % — SIGNIFICANT CHANGE UP (ref 0–6)
HCT VFR BLD CALC: 35.8 % — SIGNIFICANT CHANGE UP (ref 34.5–45)
HGB BLD-MCNC: 12.7 G/DL — SIGNIFICANT CHANGE UP (ref 11.5–15.5)
IMM GRANULOCYTES NFR BLD AUTO: 0.3 % — SIGNIFICANT CHANGE UP (ref 0–0.9)
LYMPHOCYTES # BLD AUTO: 0.78 K/UL — LOW (ref 1–3.3)
LYMPHOCYTES # BLD AUTO: 23.1 % — SIGNIFICANT CHANGE UP (ref 13–44)
MCHC RBC-ENTMCNC: 32.7 PG — SIGNIFICANT CHANGE UP (ref 27–34)
MCHC RBC-ENTMCNC: 35.5 G/DL — SIGNIFICANT CHANGE UP (ref 32–36)
MCV RBC AUTO: 92.3 FL — SIGNIFICANT CHANGE UP (ref 80–100)
MONOCYTES # BLD AUTO: 0.2 K/UL — SIGNIFICANT CHANGE UP (ref 0–0.9)
MONOCYTES NFR BLD AUTO: 5.9 % — SIGNIFICANT CHANGE UP (ref 2–14)
NEUTROPHILS # BLD AUTO: 2.21 K/UL — SIGNIFICANT CHANGE UP (ref 1.8–7.4)
NEUTROPHILS NFR BLD AUTO: 65.6 % — SIGNIFICANT CHANGE UP (ref 43–77)
NRBC # BLD: 0 /100 WBCS — SIGNIFICANT CHANGE UP (ref 0–0)
PLATELET # BLD AUTO: 255 K/UL — SIGNIFICANT CHANGE UP (ref 150–400)
RBC # BLD: 3.88 M/UL — SIGNIFICANT CHANGE UP (ref 3.8–5.2)
RBC # FLD: 13.2 % — SIGNIFICANT CHANGE UP (ref 10.3–14.5)
WBC # BLD: 3.37 K/UL — LOW (ref 3.8–10.5)
WBC # FLD AUTO: 3.37 K/UL — LOW (ref 3.8–10.5)

## 2024-09-20 PROCEDURE — G2211 COMPLEX E/M VISIT ADD ON: CPT | Mod: NC

## 2024-09-20 PROCEDURE — 99214 OFFICE O/P EST MOD 30 MIN: CPT

## 2024-09-22 PROBLEM — Z79.899 ON ANTINEOPLASTIC CHEMOTHERAPY: Status: RESOLVED | Noted: 2023-08-16 | Resolved: 2024-09-22

## 2024-09-22 PROBLEM — Z79.899 HIGH RISK MEDICATION USE: Status: ACTIVE | Noted: 2024-09-22

## 2024-09-22 NOTE — PHYSICAL EXAM
[Restricted in physically strenuous activity but ambulatory and able to carry out work of a light or sedentary nature] : Status 1- Restricted in physically strenuous activity but ambulatory and able to carry out work of a light or sedentary nature, e.g., light house work, office work [Normal] : affect appropriate [de-identified] : bilateral mastectomy with with well healed surgical scars, no discrete palapble masses, no palpable axillary nodes.  [de-identified] : well healed JENNIFFER flap scars

## 2024-09-22 NOTE — PHYSICAL EXAM
[Restricted in physically strenuous activity but ambulatory and able to carry out work of a light or sedentary nature] : Status 1- Restricted in physically strenuous activity but ambulatory and able to carry out work of a light or sedentary nature, e.g., light house work, office work [Normal] : affect appropriate [de-identified] : bilateral mastectomy with with well healed surgical scars, no discrete palapble masses, no palpable axillary nodes.  [de-identified] : well healed JENNIFFER flap scars

## 2024-09-22 NOTE — BEGINNING OF VISIT
[0] : 2) Feeling down, depressed, or hopeless: Not at all (0) [PHQ-2 Negative] : PHQ-2 Negative [[id number]] : 0 [Never] : Never [Date Discussed (MM/DD/YY): ___] : Discussed: [unfilled] [With Patient/Caregiver] : with Patient/Caregiver

## 2024-09-22 NOTE — HISTORY OF PRESENT ILLNESS
[Disease: _____________________] : Disease: [unfilled] [T: ___] : T[unfilled] [N: ___] : N[unfilled] [M: ___] : M[unfilled] [AJCC Stage: ____] : AJCC Stage: [unfilled] [100: Normal, no complaints, no evidence of disease.] : 100: Normal, no complaints, no evidence of disease. 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   She chose not to proceed with a biopsy.  She had 2 "thermography tests" done, one at a center in [place], and one at a center in [place], the patient verbally reports that these returned "negative."  She was then seen by a naturopath who prescribed various therapies, as well as essential oils per her description.  Approximately 1 month later, she presented for a repeat mammogram and sonogram at the [facility] in [place] - August 2022, a sonogram performed at that center showed in the right breast multiple echo poor avascular foci with a 7 x 8 x 10 millimeters solid mass with micro calculi and slightly decreased M-mode through transmission; a left breast sonogram showed fibrocystic disease.  Biopsy and MRI were recommended per outside note.  The patient reports that she chose not to proceed with an MRI or further biopsy.  She followed with an integrative medicine practitioner taking mistletoe injections into her right breast as well as ozone therapy (Dr. Naidu).  She repeatedly declined MRI imaging and due to no further change she opted to forgo any additional therapies.  She then presented for a bilateral mammogram and sonogram at [facility] on 06/28/2023  .  - right bresat approximate 3 centimeter spiculated mass with associated heterogeneous calcifications, apparent architectural distortion extending in a linear fashion to a point approximately 0.5 cmFN suspicious for ductal extension of disease; -  the right upper outer breast, posterior third, ~12cmFN, there was approximately 1 centimeter grouping of heterogeneous calcifications suspicious in appearance; there were no mammographic suspicious findings in the left breast;  ultrasound of the right breast demonstrated at the 10 o'clock axis, 8cmFN a 2.5 x 2.7 x 2.0 centimeter irregular hypoechoic mass, highly suspicious for malignancy ; there was an enlarged right axillary lymph node suspicious for lymph node metastases with ultrasound guided core biopsy recommended.   Bilateral breast MRI performed on 06/26/2023 with a finding in the right breast of confluent heterogeneous enhancing masses measuring 4.0 x 1.7 x 2.0 centimeters present in the upper outer breast middle depth at 10-11 o'clock as well as multiple regional clumped and heterogeneous non mass enhancement throughout the breast extending to the nipple areolar complex consistent with multifocal/multicentric malignancy; the left breast did not show any evidence of suspicious finding; there was an abnormal right axillary lymph node measuring 2 centimeters demonstrating diffuse thickened cortex consistent with metastatic adenopathy.    Biopsy - invasive ductal carcinoma ER >90%, IA >90%, HER2 neg (IHC 1+); referred for surgical evaluation.   Staging PET-CT scan on 07/05/2023 with a finding of FDG avid right breast cancer with right axillary lymph node metastasis and no distant metastasis.  Of note; there was uptake in bilateral adnexa and endometrium, which was felt to be possibly physiologic in a premenopausal patient of note.    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  Diagnosed with Stage IIA Breast cancer (clinical T3 N1 M0)  S/p dd AC X4 followd by vivian TaxolX4 with growth factor support which she completed on 11/8/23.  S/p B/L MRMs with Dr. Mallory with JENNIFFER reconstruction on 12/15/23 with Dr. Joss Evans Final Path showed 2.4 cm residual IDC, mod diff, 4+/ 24 AXLNs and 0/1 R IMLN, ER+ (95%) IA+(20% weak staining) and Her 2 josé neg (0-1+) ; had perinodal extension an done margin <1 MM skeletal muscle; for which she was referred for Radiation Oncology.  [de-identified] : ER+ 95%, IA weak 20%, Her2 negative [de-identified] : Patient returns today to rule out progression or recurrence of breast cancer and to assess treatment toxicity. She received adjuvant post-mastectomy radiation therapy from 2/5/2024 through 2/27/2024.  Lupron started 1/25/2024 and anastrozole started 3/2024.  Last Lupron 7/25/24.  LMP 2/2023. Patient started on abemaciclib after last visit, 6/2024.  She c/o fatigue and occasional diarrhea.  Arthralgia intermittently - improves with activity has intermittently noted hot flushes but no changes compared to prior to AI She states about a week ago she notice what looked liked two blood clots.  She also states she notice blood on toilet paper and on stool after bm.  She has a history of hemorrhoids.  She states her urine appeared darker than normal.  She denies any burning, urgency, or frequency.  Is anxious about recurrence of disease. She denies denies any other complaints.   LMP 7/16/23  DEXA, 4/3/2024- normal bone density

## 2024-09-22 NOTE — BEGINNING OF VISIT
[0] : 2) Feeling down, depressed, or hopeless: Not at all (0) [PHQ-2 Negative] : PHQ-2 Negative [[id number]] : 0 [Never] : Never [Date Discussed (MM/DD/YY): ___] : Discussed: [unfilled] [With Patient/Caregiver] : with Patient/Caregiver

## 2024-09-22 NOTE — HISTORY OF PRESENT ILLNESS
[Disease: _____________________] : Disease: [unfilled] [T: ___] : T[unfilled] [N: ___] : N[unfilled] [M: ___] : M[unfilled] [AJCC Stage: ____] : AJCC Stage: [unfilled] [100: Normal, no complaints, no evidence of disease.] : 100: Normal, no complaints, no evidence of disease. 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   She chose not to proceed with a biopsy.  She had 2 "thermography tests" done, one at a center in [place], and one at a center in [place], the patient verbally reports that these returned "negative."  She was then seen by a naturopath who prescribed various therapies, as well as essential oils per her description.  Approximately 1 month later, she presented for a repeat mammogram and sonogram at the [facility] in [place] - August 2022, a sonogram performed at that center showed in the right breast multiple echo poor avascular foci with a 7 x 8 x 10 millimeters solid mass with micro calculi and slightly decreased M-mode through transmission; a left breast sonogram showed fibrocystic disease.  Biopsy and MRI were recommended per outside note.  The patient reports that she chose not to proceed with an MRI or further biopsy.  She followed with an integrative medicine practitioner taking mistletoe injections into her right breast as well as ozone therapy (Dr. Naidu).  She repeatedly declined MRI imaging and due to no further change she opted to forgo any additional therapies.  She then presented for a bilateral mammogram and sonogram at [facility] on 06/28/2023  .  - right bresat approximate 3 centimeter spiculated mass with associated heterogeneous calcifications, apparent architectural distortion extending in a linear fashion to a point approximately 0.5 cmFN suspicious for ductal extension of disease; -  the right upper outer breast, posterior third, ~12cmFN, there was approximately 1 centimeter grouping of heterogeneous calcifications suspicious in appearance; there were no mammographic suspicious findings in the left breast;  ultrasound of the right breast demonstrated at the 10 o'clock axis, 8cmFN a 2.5 x 2.7 x 2.0 centimeter irregular hypoechoic mass, highly suspicious for malignancy ; there was an enlarged right axillary lymph node suspicious for lymph node metastases with ultrasound guided core biopsy recommended.   Bilateral breast MRI performed on 06/26/2023 with a finding in the right breast of confluent heterogeneous enhancing masses measuring 4.0 x 1.7 x 2.0 centimeters present in the upper outer breast middle depth at 10-11 o'clock as well as multiple regional clumped and heterogeneous non mass enhancement throughout the breast extending to the nipple areolar complex consistent with multifocal/multicentric malignancy; the left breast did not show any evidence of suspicious finding; there was an abnormal right axillary lymph node measuring 2 centimeters demonstrating diffuse thickened cortex consistent with metastatic adenopathy.    Biopsy - invasive ductal carcinoma ER >90%, [place] >90%, HER2 neg (IHC 1+); referred for surgical evaluation.   Staging PET-CT scan on 07/05/2023 with a finding of FDG avid right breast cancer with right axillary lymph node metastasis and no distant metastasis.  Of note; there was uptake in bilateral adnexa and endometrium, which was felt to be possibly physiologic in a premenopausal patient of note.    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  Diagnosed with Stage IIA Breast cancer (clinical T3 N1 M0)  S/p dd AC X4 followd by vivian TaxolX4 with growth factor support which she completed on 11/8/23.  S/p B/L MRMs with Dr. Mallory with JENNIFFER reconstruction on 12/15/23 with Dr. Joss Evans Final Path showed 2.4 cm residual IDC, mod diff, 4+/ 24 AXLNs and 0/1 R IMLN, ER+ (95%) WA+(20% weak staining) and Her 2 josé neg (0-1+) ; had perinodal extension an done margin <1 MM skeletal muscle; for which she was referred for Radiation Oncology.  [de-identified] : ER+ 95%, DC weak 20%, Her2 negative [de-identified] : Patient returns today to rule out progression or recurrence of breast cancer and to assess treatment toxicity. She received adjuvant post-mastectomy radiation therapy from 2/5/2024 through 2/27/2024.  Lupron started 1/25/2024 and anastrozole started 3/2024.  Last Lupron 7/25/24.  LMP 2/2023. Patient started on abemaciclib after last visit, 6/2024.  She c/o fatigue and occasional diarrhea.  Arthralgia intermittently - improves with activity has intermittently noted hot flushes but no changes compared to prior to AI She states about a week ago she notice what looked liked two blood clots.  She also states she notice blood on toilet paper and on stool after bm.  She has a history of hemorrhoids.  She states her urine appeared darker than normal.  She denies any burning, urgency, or frequency.  Is anxious about recurrence of disease. She denies denies any other complaints.   LMP 7/16/23  DEXA, 4/3/2024- normal bone density

## 2024-09-22 NOTE — REVIEW OF SYSTEMS
[Diarrhea: Grade 0] : Diarrhea: Grade 0 [Hot Flashes] : hot flashes [Negative] : Neurological [de-identified] : hair thinning

## 2024-09-22 NOTE — REVIEW OF SYSTEMS
[Diarrhea: Grade 0] : Diarrhea: Grade 0 [Hot Flashes] : hot flashes [Negative] : Neurological [de-identified] : hair thinning

## 2024-09-22 NOTE — ASSESSMENT
[FreeTextEntry1] : 53 yo woman with clinical Stage IIA, pathological Stage IIIA (T2N2, ER+VT weak, Her2 negative), moderately differentiated right breast cancer s/p neoadjuvant ddAC/ddT, s/p bilateral mastectomy with JENNIFFER flap and due to close margins and 4+ LN, undergoing right breast post mastectomy RT  -Will check Estradiol (Ultrasensitive) level today to ensure she remains post-menopausal -continue anastrozole 1 mg daily, tolerating well.   - continue abemaciclib 150mg BID.  She is advised take Imodium prn if experiencing diarrhea.  She is to call if she experiences diarrhea 4-5 x/day.   - previous discussed the addition of zoledronic acid 4mg every 6 months for period of 3 years to further reduce the risk of bone metastases in patient with ER+ disease. She will reconsider this. - previously reviewed her germline genetic testing with [facility]; genetics consult appreciated. - Signatera ctDNA testing done last visit and was negative explained again that there is very little data in it's use for monitoring of breast cancer recurrence; patient understands this. -she is advised to follow up with GI regarding hemorrhoids. -she was also offered referral to our psychologist, however the patient declined at this time.  - Patient had the opportunity to have all her questions answered to their satisfaction - f/u 1 month as scheduled

## 2024-09-22 NOTE — ASSESSMENT
[FreeTextEntry1] : 53 yo woman with clinical Stage IIA, pathological Stage IIIA (T2N2, ER+NM weak, Her2 negative), moderately differentiated right breast cancer s/p neoadjuvant ddAC/ddT, s/p bilateral mastectomy with JENNIFFER flap and due to close margins and 4+ LN, undergoing right breast post mastectomy RT  -Will check Estradiol (Ultrasensitive) level today to ensure she remains post-menopausal -continue anastrozole 1 mg daily, tolerating well.   - continue abemaciclib 150mg BID.  She is advised take Imodium prn if experiencing diarrhea.  She is to call if she experiences diarrhea 4-5 x/day.   - previous discussed the addition of zoledronic acid 4mg every 6 months for period of 3 years to further reduce the risk of bone metastases in patient with ER+ disease. She will reconsider this. - previously reviewed her germline genetic testing with [facility]; genetics consult appreciated. - Signatera ctDNA testing done last visit and was negative explained again that there is very little data in it's use for monitoring of breast cancer recurrence; patient understands this. -she is advised to follow up with GI regarding hemorrhoids. -she was also offered referral to our psychologist, however the patient declined at this time.  - Patient had the opportunity to have all her questions answered to their satisfaction - f/u 1 month as scheduled

## 2024-09-23 LAB
ALBUMIN SERPL ELPH-MCNC: 4.5 G/DL
ALP BLD-CCNC: 73 U/L
ALT SERPL-CCNC: 16 U/L
ANION GAP SERPL CALC-SCNC: 14 MMOL/L
APPEARANCE: ABNORMAL
AST SERPL-CCNC: 13 U/L
BILIRUB SERPL-MCNC: 0.4 MG/DL
BILIRUBIN URINE: NEGATIVE
BLOOD URINE: NEGATIVE
BUN SERPL-MCNC: 19 MG/DL
CALCIUM SERPL-MCNC: 10 MG/DL
CHLORIDE SERPL-SCNC: 105 MMOL/L
CO2 SERPL-SCNC: 23 MMOL/L
COLOR: YELLOW
CREAT SERPL-MCNC: 0.95 MG/DL
EGFR: 72 ML/MIN/1.73M2
FSH SERPL-MCNC: 6.3 IU/L
GLUCOSE QUALITATIVE U: NEGATIVE MG/DL
GLUCOSE SERPL-MCNC: 101 MG/DL
KETONES URINE: NEGATIVE MG/DL
LEUKOCYTE ESTERASE URINE: NEGATIVE
LH SERPL-ACNC: <0.3 IU/L
NITRITE URINE: NEGATIVE
PH URINE: 5.5
POTASSIUM SERPL-SCNC: 4.2 MMOL/L
PROT SERPL-MCNC: 7.1 G/DL
PROTEIN URINE: NEGATIVE MG/DL
SODIUM SERPL-SCNC: 141 MMOL/L
SPECIFIC GRAVITY URINE: 1.02
UROBILINOGEN URINE: 0.2 MG/DL

## 2024-10-07 ENCOUNTER — APPOINTMENT (OUTPATIENT)
Dept: INFUSION THERAPY | Facility: HOSPITAL | Age: 54
End: 2024-10-07

## 2024-10-07 ENCOUNTER — APPOINTMENT (OUTPATIENT)
Dept: HEMATOLOGY ONCOLOGY | Facility: CLINIC | Age: 54
End: 2024-10-07
Payer: COMMERCIAL

## 2024-10-07 ENCOUNTER — RESULT REVIEW (OUTPATIENT)
Age: 54
End: 2024-10-07

## 2024-10-07 VITALS
TEMPERATURE: 97.3 F | OXYGEN SATURATION: 97 % | DIASTOLIC BLOOD PRESSURE: 82 MMHG | WEIGHT: 191.8 LBS | RESPIRATION RATE: 16 BRPM | SYSTOLIC BLOOD PRESSURE: 125 MMHG | BODY MASS INDEX: 31.92 KG/M2 | HEART RATE: 89 BPM

## 2024-10-07 DIAGNOSIS — Z17.0 MALIGNANT NEOPLASM OF OVERLAPPING SITES OF RIGHT FEMALE BREAST: ICD-10-CM

## 2024-10-07 DIAGNOSIS — C50.811 MALIGNANT NEOPLASM OF OVERLAPPING SITES OF RIGHT FEMALE BREAST: ICD-10-CM

## 2024-10-07 LAB
BASOPHILS # BLD AUTO: 0.1 K/UL — SIGNIFICANT CHANGE UP (ref 0–0.2)
BASOPHILS NFR BLD AUTO: 2.6 % — HIGH (ref 0–2)
EOSINOPHIL # BLD AUTO: 0.05 K/UL — SIGNIFICANT CHANGE UP (ref 0–0.5)
EOSINOPHIL NFR BLD AUTO: 1.3 % — SIGNIFICANT CHANGE UP (ref 0–6)
HCT VFR BLD CALC: 34.2 % — LOW (ref 34.5–45)
HGB BLD-MCNC: 12.2 G/DL — SIGNIFICANT CHANGE UP (ref 11.5–15.5)
IMM GRANULOCYTES NFR BLD AUTO: 0.3 % — SIGNIFICANT CHANGE UP (ref 0–0.9)
LYMPHOCYTES # BLD AUTO: 0.94 K/UL — LOW (ref 1–3.3)
LYMPHOCYTES # BLD AUTO: 24.5 % — SIGNIFICANT CHANGE UP (ref 13–44)
MCHC RBC-ENTMCNC: 33.4 PG — SIGNIFICANT CHANGE UP (ref 27–34)
MCHC RBC-ENTMCNC: 35.7 G/DL — SIGNIFICANT CHANGE UP (ref 32–36)
MCV RBC AUTO: 93.7 FL — SIGNIFICANT CHANGE UP (ref 80–100)
MONOCYTES # BLD AUTO: 0.21 K/UL — SIGNIFICANT CHANGE UP (ref 0–0.9)
MONOCYTES NFR BLD AUTO: 5.5 % — SIGNIFICANT CHANGE UP (ref 2–14)
NEUTROPHILS # BLD AUTO: 2.53 K/UL — SIGNIFICANT CHANGE UP (ref 1.8–7.4)
NEUTROPHILS NFR BLD AUTO: 65.8 % — SIGNIFICANT CHANGE UP (ref 43–77)
NRBC # BLD: 0 /100 WBCS — SIGNIFICANT CHANGE UP (ref 0–0)
PLATELET # BLD AUTO: 245 K/UL — SIGNIFICANT CHANGE UP (ref 150–400)
RBC # BLD: 3.65 M/UL — LOW (ref 3.8–5.2)
RBC # FLD: 12.8 % — SIGNIFICANT CHANGE UP (ref 10.3–14.5)
WBC # BLD: 3.84 K/UL — SIGNIFICANT CHANGE UP (ref 3.8–10.5)
WBC # FLD AUTO: 3.84 K/UL — SIGNIFICANT CHANGE UP (ref 3.8–10.5)

## 2024-10-07 PROCEDURE — 99214 OFFICE O/P EST MOD 30 MIN: CPT

## 2024-10-07 PROCEDURE — G2211 COMPLEX E/M VISIT ADD ON: CPT | Mod: NC

## 2024-10-08 LAB
ALBUMIN SERPL ELPH-MCNC: 4.4 G/DL
ALP BLD-CCNC: 66 U/L
ALT SERPL-CCNC: 14 U/L
ANION GAP SERPL CALC-SCNC: 14 MMOL/L
AST SERPL-CCNC: 13 U/L
BILIRUB SERPL-MCNC: 0.4 MG/DL
BUN SERPL-MCNC: 17 MG/DL
CALCIUM SERPL-MCNC: 9.8 MG/DL
CHLORIDE SERPL-SCNC: 104 MMOL/L
CO2 SERPL-SCNC: 24 MMOL/L
CREAT SERPL-MCNC: 0.91 MG/DL
EGFR: 75 ML/MIN/1.73M2
GLUCOSE SERPL-MCNC: 93 MG/DL
POTASSIUM SERPL-SCNC: 4.5 MMOL/L
PROT SERPL-MCNC: 7 G/DL
SODIUM SERPL-SCNC: 142 MMOL/L

## 2024-10-09 NOTE — ASU PREOP CHECKLIST - LOOSE TEETH
Delivery Summary  Alise Lyn is a 26 year old  female who presented at 38w6d with BRANDON of 10/16/2024 by LMP consistent with LMP c/w 10 wk US for labor in the setting of a pregnancy complicated by close interval pregnancy, history of HSV, and THC use. The patient presented to ED triage after 2 days of worsening contractions. On arrival to ED triage her membranes ruptured and she precipitously delivered a viable female infant, quickly followed by placental delivery. Cord was doubly clamped and cut by ED provider. Please see additional documentation from RN and NICU teams for Apgars and fetal weight. Following delivery the patient was brought to labor and delivery where her bleeding was appropriate. She was given IM pitocin. Her perineum was examined and noted to have a hemostatic first degree laceration that was not repaired. No other lacerations noted though exam was somewhat limited to patient tolerance. Unable to assess EBL due to deliver in ED though no excessive bleeding noted or reported.     My attending physician, Dr. Davis was present with myself in the ED.     Kandice Araiza DO PGY-IV  Obstetrics & Gynecology  10/8/2024      Mother's Information      Delivery Blood Loss   Intrapartum & Postpartum: 10/08/24 0813 - 10/08/24 2013    Delivery Admission: 10/08/24 0813 - 10/08/24 2013         Intrapartum & Postpartum Delivery Admission    None                      Review the Delivery Report for details     I was present immediately after this delivery as above.     Anita Davis MD, MPH  Obstetrics and Gynecology       
no

## 2024-10-15 LAB — ESTRADIOL ULTRASENSITIVE: <2.5 PG/ML

## 2024-11-12 ENCOUNTER — OUTPATIENT (OUTPATIENT)
Dept: OUTPATIENT SERVICES | Facility: HOSPITAL | Age: 54
LOS: 1 days | Discharge: HOME | End: 2024-11-12

## 2024-11-12 DIAGNOSIS — C50.919 MALIGNANT NEOPLASM OF UNSPECIFIED SITE OF UNSPECIFIED FEMALE BREAST: ICD-10-CM

## 2024-11-12 DIAGNOSIS — Z98.890 OTHER SPECIFIED POSTPROCEDURAL STATES: Chronic | ICD-10-CM

## 2024-11-15 ENCOUNTER — APPOINTMENT (OUTPATIENT)
Dept: HEMATOLOGY ONCOLOGY | Facility: CLINIC | Age: 54
End: 2024-11-15
Payer: COMMERCIAL

## 2024-11-15 VITALS
HEART RATE: 80 BPM | WEIGHT: 193.78 LBS | BODY MASS INDEX: 32.25 KG/M2 | DIASTOLIC BLOOD PRESSURE: 88 MMHG | TEMPERATURE: 97.4 F | OXYGEN SATURATION: 97 % | SYSTOLIC BLOOD PRESSURE: 131 MMHG | RESPIRATION RATE: 17 BRPM

## 2024-11-15 DIAGNOSIS — Z79.899 OTHER LONG TERM (CURRENT) DRUG THERAPY: ICD-10-CM

## 2024-11-15 DIAGNOSIS — C50.811 MALIGNANT NEOPLASM OF OVERLAPPING SITES OF RIGHT FEMALE BREAST: ICD-10-CM

## 2024-11-15 DIAGNOSIS — Z79.811 LONG TERM (CURRENT) USE OF AROMATASE INHIBITORS: ICD-10-CM

## 2024-11-15 DIAGNOSIS — Z17.0 MALIGNANT NEOPLASM OF OVERLAPPING SITES OF RIGHT FEMALE BREAST: ICD-10-CM

## 2024-11-15 PROCEDURE — 99214 OFFICE O/P EST MOD 30 MIN: CPT

## 2024-12-02 NOTE — PATIENT PROFILE ADULT - NSPROMEDSADMININFO_GEN_A_NUR
Telephone call  Patient calling  she has not received the  prep  that she needs for her colonoscopy.  Transferred her to the colonoscopy scheduling department.    Kimi Aguilar RN   [facility] Nurse Advisor  7:23 AM 12/2/2024    
no concerns

## 2024-12-13 ENCOUNTER — RESULT REVIEW (OUTPATIENT)
Age: 54
End: 2024-12-13

## 2024-12-13 ENCOUNTER — APPOINTMENT (OUTPATIENT)
Dept: HEMATOLOGY ONCOLOGY | Facility: CLINIC | Age: 54
End: 2024-12-13
Payer: COMMERCIAL

## 2024-12-13 VITALS
HEART RATE: 70 BPM | TEMPERATURE: 97.3 F | WEIGHT: 193.54 LBS | OXYGEN SATURATION: 97 % | RESPIRATION RATE: 16 BRPM | BODY MASS INDEX: 32.21 KG/M2 | SYSTOLIC BLOOD PRESSURE: 120 MMHG | DIASTOLIC BLOOD PRESSURE: 81 MMHG

## 2024-12-13 DIAGNOSIS — Z79.899 OTHER LONG TERM (CURRENT) DRUG THERAPY: ICD-10-CM

## 2024-12-13 DIAGNOSIS — Z79.811 LONG TERM (CURRENT) USE OF AROMATASE INHIBITORS: ICD-10-CM

## 2024-12-13 DIAGNOSIS — C50.911 MALIGNANT NEOPLASM OF UNSPECIFIED SITE OF RIGHT FEMALE BREAST: ICD-10-CM

## 2024-12-13 DIAGNOSIS — Z17.0 MALIGNANT NEOPLASM OF UNSPECIFIED SITE OF RIGHT FEMALE BREAST: ICD-10-CM

## 2024-12-13 LAB
ALBUMIN SERPL ELPH-MCNC: 4.3 G/DL
ALP BLD-CCNC: 64 U/L
ALT SERPL-CCNC: 16 U/L
ANION GAP SERPL CALC-SCNC: 11 MMOL/L
AST SERPL-CCNC: 13 U/L
BASOPHILS # BLD AUTO: 0.12 K/UL — SIGNIFICANT CHANGE UP (ref 0–0.2)
BASOPHILS NFR BLD AUTO: 2.5 % — HIGH (ref 0–2)
BILIRUB SERPL-MCNC: 0.3 MG/DL
BUN SERPL-MCNC: 25 MG/DL
CALCIUM SERPL-MCNC: 9.7 MG/DL
CHLORIDE SERPL-SCNC: 103 MMOL/L
CO2 SERPL-SCNC: 25 MMOL/L
CREAT SERPL-MCNC: 1.09 MG/DL
EGFR: 61 ML/MIN/1.73M2
EOSINOPHIL # BLD AUTO: 0.11 K/UL — SIGNIFICANT CHANGE UP (ref 0–0.5)
EOSINOPHIL NFR BLD AUTO: 2.2 % — SIGNIFICANT CHANGE UP (ref 0–6)
GLUCOSE SERPL-MCNC: 102 MG/DL
HCT VFR BLD CALC: 35.2 % — SIGNIFICANT CHANGE UP (ref 34.5–45)
HGB BLD-MCNC: 12.5 G/DL — SIGNIFICANT CHANGE UP (ref 11.5–15.5)
IMM GRANULOCYTES NFR BLD AUTO: 0.4 % — SIGNIFICANT CHANGE UP (ref 0–0.9)
LYMPHOCYTES # BLD AUTO: 1 K/UL — SIGNIFICANT CHANGE UP (ref 1–3.3)
LYMPHOCYTES # BLD AUTO: 20.4 % — SIGNIFICANT CHANGE UP (ref 13–44)
MCHC RBC-ENTMCNC: 32.7 PG — SIGNIFICANT CHANGE UP (ref 27–34)
MCHC RBC-ENTMCNC: 35.5 G/DL — SIGNIFICANT CHANGE UP (ref 32–36)
MCV RBC AUTO: 92.1 FL — SIGNIFICANT CHANGE UP (ref 80–100)
MONOCYTES # BLD AUTO: 0.35 K/UL — SIGNIFICANT CHANGE UP (ref 0–0.9)
MONOCYTES NFR BLD AUTO: 7.2 % — SIGNIFICANT CHANGE UP (ref 2–14)
NEUTROPHILS # BLD AUTO: 3.29 K/UL — SIGNIFICANT CHANGE UP (ref 1.8–7.4)
NEUTROPHILS NFR BLD AUTO: 67.3 % — SIGNIFICANT CHANGE UP (ref 43–77)
NRBC # BLD: 0 /100 WBCS — SIGNIFICANT CHANGE UP (ref 0–0)
PLATELET # BLD AUTO: 219 K/UL — SIGNIFICANT CHANGE UP (ref 150–400)
POTASSIUM SERPL-SCNC: 4.4 MMOL/L
PROT SERPL-MCNC: 6.8 G/DL
RBC # BLD: 3.82 M/UL — SIGNIFICANT CHANGE UP (ref 3.8–5.2)
RBC # FLD: 11.5 % — SIGNIFICANT CHANGE UP (ref 10.3–14.5)
SODIUM SERPL-SCNC: 139 MMOL/L
WBC # BLD: 4.89 K/UL — SIGNIFICANT CHANGE UP (ref 3.8–10.5)
WBC # FLD AUTO: 4.89 K/UL — SIGNIFICANT CHANGE UP (ref 3.8–10.5)

## 2024-12-13 PROCEDURE — 99214 OFFICE O/P EST MOD 30 MIN: CPT

## 2025-01-03 ENCOUNTER — RX RENEWAL (OUTPATIENT)
Age: 55
End: 2025-01-03

## 2025-01-09 ENCOUNTER — EMERGENCY (EMERGENCY)
Facility: HOSPITAL | Age: 55
LOS: 1 days | Discharge: HOME | End: 2025-01-09
Attending: EMERGENCY MEDICINE
Payer: COMMERCIAL

## 2025-01-09 VITALS
RESPIRATION RATE: 16 BRPM | TEMPERATURE: 98 F | DIASTOLIC BLOOD PRESSURE: 85 MMHG | OXYGEN SATURATION: 96 % | SYSTOLIC BLOOD PRESSURE: 120 MMHG | HEART RATE: 74 BPM

## 2025-01-09 VITALS
OXYGEN SATURATION: 100 % | HEIGHT: 66 IN | RESPIRATION RATE: 18 BRPM | DIASTOLIC BLOOD PRESSURE: 86 MMHG | TEMPERATURE: 98 F | SYSTOLIC BLOOD PRESSURE: 144 MMHG | WEIGHT: 190.04 LBS | HEART RATE: 84 BPM

## 2025-01-09 DIAGNOSIS — Z98.890 OTHER SPECIFIED POSTPROCEDURAL STATES: Chronic | ICD-10-CM

## 2025-01-09 LAB
ALBUMIN SERPL ELPH-MCNC: 4.4 G/DL — SIGNIFICANT CHANGE UP (ref 3.3–5)
ALP SERPL-CCNC: 72 U/L — SIGNIFICANT CHANGE UP (ref 40–120)
ALT FLD-CCNC: 15 U/L — SIGNIFICANT CHANGE UP (ref 10–45)
ANION GAP SERPL CALC-SCNC: 14 MMOL/L — SIGNIFICANT CHANGE UP (ref 5–17)
APTT BLD: 29.7 SEC — SIGNIFICANT CHANGE UP (ref 24.5–35.6)
AST SERPL-CCNC: 14 U/L — SIGNIFICANT CHANGE UP (ref 10–40)
BILIRUB SERPL-MCNC: 0.2 MG/DL — SIGNIFICANT CHANGE UP (ref 0.2–1.2)
BUN SERPL-MCNC: 20 MG/DL — SIGNIFICANT CHANGE UP (ref 7–23)
CALCIUM SERPL-MCNC: 9.9 MG/DL — SIGNIFICANT CHANGE UP (ref 8.4–10.5)
CHLORIDE SERPL-SCNC: 103 MMOL/L — SIGNIFICANT CHANGE UP (ref 96–108)
CO2 SERPL-SCNC: 22 MMOL/L — SIGNIFICANT CHANGE UP (ref 22–31)
CREAT SERPL-MCNC: 0.85 MG/DL — SIGNIFICANT CHANGE UP (ref 0.5–1.3)
EGFR: 82 ML/MIN/1.73M2 — SIGNIFICANT CHANGE UP
GAS PNL BLDV: SIGNIFICANT CHANGE UP
GLUCOSE SERPL-MCNC: 112 MG/DL — HIGH (ref 70–99)
HCG SERPL-ACNC: <2 MIU/ML — SIGNIFICANT CHANGE UP
HCT VFR BLD CALC: 38.1 % — SIGNIFICANT CHANGE UP (ref 34.5–45)
HGB BLD-MCNC: 13.4 G/DL — SIGNIFICANT CHANGE UP (ref 11.5–15.5)
INR BLD: 0.97 RATIO — SIGNIFICANT CHANGE UP (ref 0.85–1.16)
MCHC RBC-ENTMCNC: 33 PG — SIGNIFICANT CHANGE UP (ref 27–34)
MCHC RBC-ENTMCNC: 35.2 G/DL — SIGNIFICANT CHANGE UP (ref 32–36)
MCV RBC AUTO: 93.8 FL — SIGNIFICANT CHANGE UP (ref 80–100)
NRBC # BLD: 0 /100 WBCS — SIGNIFICANT CHANGE UP (ref 0–0)
PLATELET # BLD AUTO: 274 K/UL — SIGNIFICANT CHANGE UP (ref 150–400)
POTASSIUM SERPL-MCNC: 3.9 MMOL/L — SIGNIFICANT CHANGE UP (ref 3.5–5.3)
POTASSIUM SERPL-SCNC: 3.9 MMOL/L — SIGNIFICANT CHANGE UP (ref 3.5–5.3)
PROT SERPL-MCNC: 7.3 G/DL — SIGNIFICANT CHANGE UP (ref 6–8.3)
PROTHROM AB SERPL-ACNC: 11.2 SEC — SIGNIFICANT CHANGE UP (ref 9.9–13.4)
RBC # BLD: 4.06 M/UL — SIGNIFICANT CHANGE UP (ref 3.8–5.2)
RBC # FLD: 12.1 % — SIGNIFICANT CHANGE UP (ref 10.3–14.5)
SODIUM SERPL-SCNC: 139 MMOL/L — SIGNIFICANT CHANGE UP (ref 135–145)
TROPONIN T, HIGH SENSITIVITY RESULT: <6 NG/L — SIGNIFICANT CHANGE UP (ref 0–51)
WBC # BLD: 7.42 K/UL — SIGNIFICANT CHANGE UP (ref 3.8–10.5)
WBC # FLD AUTO: 7.42 K/UL — SIGNIFICANT CHANGE UP (ref 3.8–10.5)

## 2025-01-09 PROCEDURE — 71275 CT ANGIOGRAPHY CHEST: CPT | Mod: MC

## 2025-01-09 PROCEDURE — 80053 COMPREHEN METABOLIC PANEL: CPT

## 2025-01-09 PROCEDURE — 71046 X-RAY EXAM CHEST 2 VIEWS: CPT

## 2025-01-09 PROCEDURE — 82435 ASSAY OF BLOOD CHLORIDE: CPT

## 2025-01-09 PROCEDURE — 85018 HEMOGLOBIN: CPT

## 2025-01-09 PROCEDURE — 85610 PROTHROMBIN TIME: CPT

## 2025-01-09 PROCEDURE — 83605 ASSAY OF LACTIC ACID: CPT

## 2025-01-09 PROCEDURE — 85027 COMPLETE CBC AUTOMATED: CPT

## 2025-01-09 PROCEDURE — 71046 X-RAY EXAM CHEST 2 VIEWS: CPT | Mod: 26

## 2025-01-09 PROCEDURE — 85014 HEMATOCRIT: CPT

## 2025-01-09 PROCEDURE — 36000 PLACE NEEDLE IN VEIN: CPT | Mod: XU

## 2025-01-09 PROCEDURE — 84484 ASSAY OF TROPONIN QUANT: CPT

## 2025-01-09 PROCEDURE — 82803 BLOOD GASES ANY COMBINATION: CPT

## 2025-01-09 PROCEDURE — 84702 CHORIONIC GONADOTROPIN TEST: CPT

## 2025-01-09 PROCEDURE — 84132 ASSAY OF SERUM POTASSIUM: CPT

## 2025-01-09 PROCEDURE — 71275 CT ANGIOGRAPHY CHEST: CPT | Mod: 26

## 2025-01-09 PROCEDURE — 93005 ELECTROCARDIOGRAM TRACING: CPT

## 2025-01-09 PROCEDURE — 84295 ASSAY OF SERUM SODIUM: CPT

## 2025-01-09 PROCEDURE — 82330 ASSAY OF CALCIUM: CPT

## 2025-01-09 PROCEDURE — 99285 EMERGENCY DEPT VISIT HI MDM: CPT | Mod: 25

## 2025-01-09 PROCEDURE — 85730 THROMBOPLASTIN TIME PARTIAL: CPT

## 2025-01-09 PROCEDURE — 99285 EMERGENCY DEPT VISIT HI MDM: CPT

## 2025-01-09 PROCEDURE — 82947 ASSAY GLUCOSE BLOOD QUANT: CPT

## 2025-01-09 NOTE — ED ADULT NURSE NOTE - OBJECTIVE STATEMENT
55 y/o female, A&O x4, PMH breast cancer treated with radiation and HRT presents to the ED c/o chest and arm pain. Pt endorses traveling to [facility] 1 week ago and developing left lateral chest pain radiating down left arm. Pt states going to PCP today who suggested coming to ED for PE study. Pt affect is calm and appropriate, spontaneous unlabored breathing, strong peripheral pulses, PERRLA, equal strength sensation ROM bilaterally, skin clean dry intact. Pt denies palpitations, SOB/difficulty breathing, fever/chills, HA, abd pain, N/V/D, lightheadedness/dizziness, numbness/tingling, leg pain. Safety and comfort measures maintained.

## 2025-01-09 NOTE — ED ADULT NURSE NOTE - NSFALLUNIVINTERV_ED_ALL_ED
Bed/Stretcher in lowest position, wheels locked, appropriate side rails in place/Call bell, personal items and telephone in reach/Instruct patient to call for assistance before getting out of bed/chair/stretcher/Non-slip footwear applied when patient is off stretcher/[place] to call system/Physically safe environment - no spills, clutter or unnecessary equipment/Purposeful proactive rounding/Room/bathroom lighting operational, light cord in reach

## 2025-01-09 NOTE — ED PROVIDER NOTE - NSFOLLOWUPINSTRUCTIONS_ED_ALL_ED_FT
YOU WERE SEEN IN THE ED FOR: Chest pain     Your CAT scan did not show a blood clot in the lungs.     FOR PAIN/FEVER, YOU MAY TAKE TYLENOL (acetaminophen) 1,000mg AND/OR IBUPROFEN (advil or motrin) 600mg every 6 hours as needed. FOLLOW THE INSTRUCTIONS ON THE LABEL/CONTAINER.    Buy Salonpas 4% lidocaine patch. Place over area of greatest pain.  Apply for 12 hours at a time.  Do not use more than 2 patches per day.     PLEASE FOLLOW UP WITH YOUR PRIVATE PHYSICIAN WITHIN THE NEXT 48 HOURS. BRING COPIES OF YOUR RESULTS.    RETURN TO THE EMERGENCY DEPARTMENT IF YOU EXPERIENCE ANY NEW/CONCERNING/WORSENING SYMPTOMS SUCH AS BUT NOT LIMITED TO: worsening chest pain, difficulty breathing, fever, lightheadedness/fainting, or any other concerns.

## 2025-01-09 NOTE — ED PROVIDER NOTE - PATIENT PORTAL LINK FT
You can access the FollowMyHealth Patient Portal offered by [facility] by registering at the following website: [email or web address]. By joining [employer]’s FollowMyHealth portal, you will also be able to view your health information using other applications (apps) compatible with our system.

## 2025-01-09 NOTE — ED PROVIDER NOTE - PROGRESS NOTE DETAILS
Aliyah Jules DO (PGY-2): Patient signed out to me at shift change. CTA without PE or acute intra-thoracic pathology. Trop <6. Informed patient of results. Stable for discharge.

## 2025-01-09 NOTE — ED ADULT NURSE NOTE - HISTORY OF COVID-19 VACCINATION
March 30, 2017    Liyah Cruz DO  [address] [facility] [address] [facility]     Patient: Claudette Pouch   YOB: 1940   Date of Visit: 3/30/2017       Dear Dr. Antonia Raygoza DO:    Thank you for referring Carlo Langston to me for eval (Scanned to Media Tab - Date of Service 09-); and anesth,angioplasty.     Family History   Problem Relation Age of Onset   • Diabetes Father    • Other[other] [OTHER] Father 58   • Diabetes Mother 59     (cause of death)   • Diabetes Brother    • Oth Oxybutynin Chloride ER 10 MG Oral Tablet 24 Hr Take 1 tablet (10 mg total) by mouth daily. Disp: 90 tablet Rfl: 3   aspirin 325 MG Oral Tab Take 325 mg by mouth once daily.  Disp:  Rfl:    Milk Thistle Extract 175 MG Oral Tab Take 1 tablet by mouth once philip Wearing Rx      Sphere Cylinder Axis Add   Right +1.00 +1.25 100 2.50   Left +0.75 +0.50 093 2.50       Type:  Progressive bifocal      Manifest Refraction      Sphere Cylinder Axis Dist Add Near   Right +0.50 +1.25 100 20/20- +2.75 20/20   Left +0.75 +0. Vaccine status unknown

## 2025-01-09 NOTE — ED ADULT NURSE NOTE - BREATHING, MLM
Implemented All Fall Risk Interventions:  [place] to call system. Call bell, personal items and telephone within reach. Instruct patient to call for assistance. Room bathroom lighting operational. Non-slip footwear when patient is off stretcher. Physically safe environment: no spills, clutter or unnecessary equipment. Stretcher in lowest position, wheels locked, appropriate side rails in place. Provide visual cue, wrist band, yellow gown, etc. Monitor gait and stability. Monitor for mental status changes and reorient to person, place, and time. Review medications for side effects contributing to fall risk. Reinforce activity limits and safety measures with patient and family.
Spontaneous, unlabored and symmetrical

## 2025-01-09 NOTE — ED PROVIDER NOTE - CLINICAL SUMMARY MEDICAL DECISION MAKING FREE TEXT BOX
Attending note.  Patient was seen in room #22 to the right.  Patient recently flew back from [facility] 1 week ago and is complaining of pain on the left lateral chest.  She denies any shortness of breath, palpitations, leg pain or swelling.  Patient has a history of breast cancer treated with radiation and HRT.  Patient was seen by her PCP today and sent to the emergency department for further evaluation.  Concern for pulmonary embolus.  Patient reports no other significant past medical history and takes no other medications.  She is a non-smoker.     ROS-as above, otherwise negative.  PE-patient is alert no acute distress.  Heart rate was 84, respirations 18, blood pressure 144/86, oxygen saturation 100%.  Skin is warm and dry.  Lungs are clear and equal bilaterally with no wheezes, rales or rhonchi.  Heart is regular rate and rhythm.  Abdomen is soft, nontender nondistended.  There is no CVA tenderness.  There is no extremity edema or calf tenderness.  She has full range of motion of the upper extremity with no arm edema and normal neurovascular examination.     A/P-patient with recent prolonged flight to [facility] and back with pain in the left lateral chest since the emergency department for further evaluation and to rule out pulmonary embolus.  CT angio to rule out PE, EKG, troponin, labs and reassess.

## 2025-01-09 NOTE — ED ADULT NURSE NOTE - NS ED NURSE LEVEL OF CONSCIOUSNESS AFFECT
Patient up to bedside, vital signs and site stable. Patient ambulated to bathroom without difficulty. Right groin vascade closure device remains intact. Calm

## 2025-01-14 ENCOUNTER — OUTPATIENT (OUTPATIENT)
Dept: OUTPATIENT SERVICES | Facility: HOSPITAL | Age: 55
LOS: 1 days | Discharge: HOME | End: 2025-01-14

## 2025-01-14 DIAGNOSIS — C50.919 MALIGNANT NEOPLASM OF UNSPECIFIED SITE OF UNSPECIFIED FEMALE BREAST: ICD-10-CM

## 2025-01-14 DIAGNOSIS — Z98.890 OTHER SPECIFIED POSTPROCEDURAL STATES: Chronic | ICD-10-CM

## 2025-01-16 ENCOUNTER — RESULT REVIEW (OUTPATIENT)
Age: 55
End: 2025-01-16

## 2025-01-16 ENCOUNTER — APPOINTMENT (OUTPATIENT)
Dept: HEMATOLOGY ONCOLOGY | Facility: CLINIC | Age: 55
End: 2025-01-16
Payer: COMMERCIAL

## 2025-01-16 VITALS
BODY MASS INDEX: 31.92 KG/M2 | TEMPERATURE: 97.2 F | SYSTOLIC BLOOD PRESSURE: 122 MMHG | WEIGHT: 191.8 LBS | OXYGEN SATURATION: 98 % | DIASTOLIC BLOOD PRESSURE: 84 MMHG | RESPIRATION RATE: 16 BRPM | HEART RATE: 73 BPM

## 2025-01-16 DIAGNOSIS — Z15.09 GENETIC SUSCEPTIBILITY TO OTHER DISEASE: ICD-10-CM

## 2025-01-16 DIAGNOSIS — Z15.89 GENETIC SUSCEPTIBILITY TO OTHER DISEASE: ICD-10-CM

## 2025-01-16 LAB
ALBUMIN SERPL ELPH-MCNC: 4.4 G/DL
ALP BLD-CCNC: 72 U/L
ALT SERPL-CCNC: 16 U/L
ANION GAP SERPL CALC-SCNC: 11 MMOL/L
AST SERPL-CCNC: 12 U/L
BASOPHILS # BLD AUTO: 0.11 K/UL — SIGNIFICANT CHANGE UP (ref 0–0.2)
BASOPHILS NFR BLD AUTO: 3.1 % — HIGH (ref 0–2)
BILIRUB SERPL-MCNC: 0.4 MG/DL
BUN SERPL-MCNC: 17 MG/DL
CALCIUM SERPL-MCNC: 9.9 MG/DL
CHLORIDE SERPL-SCNC: 103 MMOL/L
CO2 SERPL-SCNC: 28 MMOL/L
CREAT SERPL-MCNC: 0.89 MG/DL
EGFR: 77 ML/MIN/1.73M2
EOSINOPHIL # BLD AUTO: 0.09 K/UL — SIGNIFICANT CHANGE UP (ref 0–0.5)
EOSINOPHIL NFR BLD AUTO: 2.5 % — SIGNIFICANT CHANGE UP (ref 0–6)
FERRITIN SERPL-MCNC: 132 NG/ML
FOLATE SERPL-MCNC: 12.6 NG/ML
GLUCOSE SERPL-MCNC: 98 MG/DL
HCT VFR BLD CALC: 36.6 % — SIGNIFICANT CHANGE UP (ref 34.5–45)
HGB BLD-MCNC: 13 G/DL — SIGNIFICANT CHANGE UP (ref 11.5–15.5)
IMM GRANULOCYTES NFR BLD AUTO: 0.3 % — SIGNIFICANT CHANGE UP (ref 0–0.9)
IRON SATN MFR SERPL: 41 %
IRON SERPL-MCNC: 137 UG/DL
LYMPHOCYTES # BLD AUTO: 1 K/UL — SIGNIFICANT CHANGE UP (ref 1–3.3)
LYMPHOCYTES # BLD AUTO: 28.1 % — SIGNIFICANT CHANGE UP (ref 13–44)
MCHC RBC-ENTMCNC: 33.1 PG — SIGNIFICANT CHANGE UP (ref 27–34)
MCHC RBC-ENTMCNC: 35.5 G/DL — SIGNIFICANT CHANGE UP (ref 32–36)
MCV RBC AUTO: 93.1 FL — SIGNIFICANT CHANGE UP (ref 80–100)
MONOCYTES # BLD AUTO: 0.24 K/UL — SIGNIFICANT CHANGE UP (ref 0–0.9)
MONOCYTES NFR BLD AUTO: 6.7 % — SIGNIFICANT CHANGE UP (ref 2–14)
NEUTROPHILS # BLD AUTO: 2.11 K/UL — SIGNIFICANT CHANGE UP (ref 1.8–7.4)
NEUTROPHILS NFR BLD AUTO: 59.3 % — SIGNIFICANT CHANGE UP (ref 43–77)
NRBC # BLD: 0 /100 WBCS — SIGNIFICANT CHANGE UP (ref 0–0)
NRBC BLD-RTO: 0 /100 WBCS — SIGNIFICANT CHANGE UP (ref 0–0)
PLATELET # BLD AUTO: 248 K/UL — SIGNIFICANT CHANGE UP (ref 150–400)
POTASSIUM SERPL-SCNC: 4 MMOL/L
PROT SERPL-MCNC: 7 G/DL
RBC # BLD: 3.93 M/UL — SIGNIFICANT CHANGE UP (ref 3.8–5.2)
RBC # FLD: 12.2 % — SIGNIFICANT CHANGE UP (ref 10.3–14.5)
SODIUM SERPL-SCNC: 142 MMOL/L
TIBC SERPL-MCNC: 332 UG/DL
TSH SERPL-ACNC: 2.25 UIU/ML
UIBC SERPL-MCNC: 196 UG/DL
VIT B12 SERPL-MCNC: 436 PG/ML
WBC # BLD: 3.56 K/UL — LOW (ref 3.8–10.5)
WBC # FLD AUTO: 3.56 K/UL — LOW (ref 3.8–10.5)

## 2025-01-16 PROCEDURE — G2211 COMPLEX E/M VISIT ADD ON: CPT | Mod: NC

## 2025-01-16 PROCEDURE — 99214 OFFICE O/P EST MOD 30 MIN: CPT

## 2025-01-24 ENCOUNTER — APPOINTMENT (OUTPATIENT)
Dept: BREAST CENTER | Facility: CLINIC | Age: 55
End: 2025-01-24
Payer: COMMERCIAL

## 2025-01-24 VITALS
HEIGHT: 65 IN | WEIGHT: 190 LBS | BODY MASS INDEX: 31.65 KG/M2 | RESPIRATION RATE: 16 BRPM | SYSTOLIC BLOOD PRESSURE: 125 MMHG | DIASTOLIC BLOOD PRESSURE: 80 MMHG | HEART RATE: 73 BPM

## 2025-01-24 DIAGNOSIS — C50.811 MALIGNANT NEOPLASM OF OVERLAPPING SITES OF RIGHT FEMALE BREAST: ICD-10-CM

## 2025-01-24 DIAGNOSIS — Z17.0 MALIGNANT NEOPLASM OF OVERLAPPING SITES OF RIGHT FEMALE BREAST: ICD-10-CM

## 2025-01-24 PROCEDURE — 99213 OFFICE O/P EST LOW 20 MIN: CPT

## 2025-02-03 ENCOUNTER — APPOINTMENT (OUTPATIENT)
Dept: RADIATION ONCOLOGY | Facility: CLINIC | Age: 55
End: 2025-02-03
Payer: COMMERCIAL

## 2025-02-03 VITALS
DIASTOLIC BLOOD PRESSURE: 86 MMHG | RESPIRATION RATE: 18 BRPM | HEART RATE: 79 BPM | OXYGEN SATURATION: 100 % | SYSTOLIC BLOOD PRESSURE: 122 MMHG

## 2025-02-03 PROCEDURE — 99212 OFFICE O/P EST SF 10 MIN: CPT | Mod: GC

## 2025-02-14 ENCOUNTER — APPOINTMENT (OUTPATIENT)
Dept: HEMATOLOGY ONCOLOGY | Facility: CLINIC | Age: 55
End: 2025-02-14
Payer: COMMERCIAL

## 2025-02-14 ENCOUNTER — RESULT REVIEW (OUTPATIENT)
Age: 55
End: 2025-02-14

## 2025-02-14 ENCOUNTER — NON-APPOINTMENT (OUTPATIENT)
Age: 55
End: 2025-02-14

## 2025-02-14 VITALS
SYSTOLIC BLOOD PRESSURE: 127 MMHG | HEART RATE: 76 BPM | DIASTOLIC BLOOD PRESSURE: 84 MMHG | OXYGEN SATURATION: 99 % | WEIGHT: 191.8 LBS | BODY MASS INDEX: 31.92 KG/M2 | TEMPERATURE: 97.2 F | RESPIRATION RATE: 16 BRPM

## 2025-02-14 DIAGNOSIS — Z17.0 MALIGNANT NEOPLASM OF UNSPECIFIED SITE OF RIGHT FEMALE BREAST: ICD-10-CM

## 2025-02-14 DIAGNOSIS — Z79.899 OTHER LONG TERM (CURRENT) DRUG THERAPY: ICD-10-CM

## 2025-02-14 DIAGNOSIS — Z79.811 LONG TERM (CURRENT) USE OF AROMATASE INHIBITORS: ICD-10-CM

## 2025-02-14 DIAGNOSIS — C50.911 MALIGNANT NEOPLASM OF UNSPECIFIED SITE OF RIGHT FEMALE BREAST: ICD-10-CM

## 2025-02-14 LAB
BASOPHILS # BLD AUTO: 0.1 K/UL — SIGNIFICANT CHANGE UP (ref 0–0.2)
BASOPHILS NFR BLD AUTO: 2.6 % — HIGH (ref 0–2)
EOSINOPHIL # BLD AUTO: 0.12 K/UL — SIGNIFICANT CHANGE UP (ref 0–0.5)
EOSINOPHIL NFR BLD AUTO: 3.2 % — SIGNIFICANT CHANGE UP (ref 0–6)
HCT VFR BLD CALC: 35.9 % — SIGNIFICANT CHANGE UP (ref 34.5–45)
HGB BLD-MCNC: 12.7 G/DL — SIGNIFICANT CHANGE UP (ref 11.5–15.5)
IMM GRANULOCYTES NFR BLD AUTO: 0 % — SIGNIFICANT CHANGE UP (ref 0–0.9)
LYMPHOCYTES # BLD AUTO: 1.08 K/UL — SIGNIFICANT CHANGE UP (ref 1–3.3)
LYMPHOCYTES # BLD AUTO: 28.6 % — SIGNIFICANT CHANGE UP (ref 13–44)
MCHC RBC-ENTMCNC: 32.4 PG — SIGNIFICANT CHANGE UP (ref 27–34)
MCHC RBC-ENTMCNC: 35.4 G/DL — SIGNIFICANT CHANGE UP (ref 32–36)
MCV RBC AUTO: 91.6 FL — SIGNIFICANT CHANGE UP (ref 80–100)
MONOCYTES # BLD AUTO: 0.3 K/UL — SIGNIFICANT CHANGE UP (ref 0–0.9)
MONOCYTES NFR BLD AUTO: 7.9 % — SIGNIFICANT CHANGE UP (ref 2–14)
NEUTROPHILS # BLD AUTO: 2.18 K/UL — SIGNIFICANT CHANGE UP (ref 1.8–7.4)
NEUTROPHILS NFR BLD AUTO: 57.7 % — SIGNIFICANT CHANGE UP (ref 43–77)
NRBC BLD AUTO-RTO: 0 /100 WBCS — SIGNIFICANT CHANGE UP (ref 0–0)
PLATELET # BLD AUTO: 246 K/UL — SIGNIFICANT CHANGE UP (ref 150–400)
RBC # BLD: 3.92 M/UL — SIGNIFICANT CHANGE UP (ref 3.8–5.2)
RBC # FLD: 12.4 % — SIGNIFICANT CHANGE UP (ref 10.3–14.5)
WBC # BLD: 3.78 K/UL — LOW (ref 3.8–10.5)
WBC # FLD AUTO: 3.78 K/UL — LOW (ref 3.8–10.5)

## 2025-02-14 PROCEDURE — 99214 OFFICE O/P EST MOD 30 MIN: CPT

## 2025-02-20 LAB
ALBUMIN SERPL ELPH-MCNC: 4.3 G/DL
ALP BLD-CCNC: 68 U/L
ALT SERPL-CCNC: 17 U/L
ANION GAP SERPL CALC-SCNC: 12 MMOL/L
AST SERPL-CCNC: 14 U/L
BILIRUB SERPL-MCNC: 0.3 MG/DL
BUN SERPL-MCNC: 19 MG/DL
CALCIUM SERPL-MCNC: 9.9 MG/DL
CHLORIDE SERPL-SCNC: 103 MMOL/L
CO2 SERPL-SCNC: 28 MMOL/L
CREAT SERPL-MCNC: 0.79 MG/DL
EGFR: 89 ML/MIN/1.73M2
GLUCOSE SERPL-MCNC: 96 MG/DL
POTASSIUM SERPL-SCNC: 4.1 MMOL/L
PROT SERPL-MCNC: 6.6 G/DL
SODIUM SERPL-SCNC: 142 MMOL/L

## 2025-03-14 ENCOUNTER — OUTPATIENT (OUTPATIENT)
Dept: OUTPATIENT SERVICES | Facility: HOSPITAL | Age: 55
LOS: 1 days | Discharge: HOME | End: 2025-03-14

## 2025-03-14 ENCOUNTER — RESULT REVIEW (OUTPATIENT)
Age: 55
End: 2025-03-14

## 2025-03-14 ENCOUNTER — APPOINTMENT (OUTPATIENT)
Dept: HEMATOLOGY ONCOLOGY | Facility: CLINIC | Age: 55
End: 2025-03-14
Payer: COMMERCIAL

## 2025-03-14 VITALS
RESPIRATION RATE: 16 BRPM | TEMPERATURE: 97.3 F | OXYGEN SATURATION: 97 % | DIASTOLIC BLOOD PRESSURE: 75 MMHG | WEIGHT: 195.11 LBS | SYSTOLIC BLOOD PRESSURE: 111 MMHG | BODY MASS INDEX: 32.47 KG/M2 | HEART RATE: 81 BPM

## 2025-03-14 DIAGNOSIS — C50.811 MALIGNANT NEOPLASM OF OVERLAPPING SITES OF RIGHT FEMALE BREAST: ICD-10-CM

## 2025-03-14 DIAGNOSIS — Z98.890 OTHER SPECIFIED POSTPROCEDURAL STATES: Chronic | ICD-10-CM

## 2025-03-14 DIAGNOSIS — Z79.899 OTHER LONG TERM (CURRENT) DRUG THERAPY: ICD-10-CM

## 2025-03-14 DIAGNOSIS — Z17.0 MALIGNANT NEOPLASM OF OVERLAPPING SITES OF RIGHT FEMALE BREAST: ICD-10-CM

## 2025-03-14 LAB
ALBUMIN SERPL ELPH-MCNC: 4.3 G/DL
ALP BLD-CCNC: 68 U/L
ALT SERPL-CCNC: 18 U/L
ANION GAP SERPL CALC-SCNC: 9 MMOL/L
AST SERPL-CCNC: 15 U/L
BASOPHILS # BLD AUTO: 0.12 K/UL — SIGNIFICANT CHANGE UP (ref 0–0.2)
BASOPHILS NFR BLD AUTO: 2.6 % — HIGH (ref 0–2)
BILIRUB SERPL-MCNC: 0.3 MG/DL
BUN SERPL-MCNC: 21 MG/DL
CALCIUM SERPL-MCNC: 9.8 MG/DL
CHLORIDE SERPL-SCNC: 103 MMOL/L
CO2 SERPL-SCNC: 29 MMOL/L
CREAT SERPL-MCNC: 0.82 MG/DL
EGFRCR SERPLBLD CKD-EPI 2021: 85 ML/MIN/1.73M2
EOSINOPHIL # BLD AUTO: 0.1 K/UL — SIGNIFICANT CHANGE UP (ref 0–0.5)
EOSINOPHIL NFR BLD AUTO: 2.2 % — SIGNIFICANT CHANGE UP (ref 0–6)
GLUCOSE SERPL-MCNC: 103 MG/DL
HCT VFR BLD CALC: 37.3 % — SIGNIFICANT CHANGE UP (ref 34.5–45)
HGB BLD-MCNC: 13.2 G/DL — SIGNIFICANT CHANGE UP (ref 11.5–15.5)
IMM GRANULOCYTES NFR BLD AUTO: 0.2 % — SIGNIFICANT CHANGE UP (ref 0–0.9)
LYMPHOCYTES # BLD AUTO: 0.98 K/UL — LOW (ref 1–3.3)
LYMPHOCYTES # BLD AUTO: 21.6 % — SIGNIFICANT CHANGE UP (ref 13–44)
MCHC RBC-ENTMCNC: 32.6 PG — SIGNIFICANT CHANGE UP (ref 27–34)
MCHC RBC-ENTMCNC: 35.4 G/DL — SIGNIFICANT CHANGE UP (ref 32–36)
MCV RBC AUTO: 92.1 FL — SIGNIFICANT CHANGE UP (ref 80–100)
MONOCYTES # BLD AUTO: 0.32 K/UL — SIGNIFICANT CHANGE UP (ref 0–0.9)
MONOCYTES NFR BLD AUTO: 7.1 % — SIGNIFICANT CHANGE UP (ref 2–14)
NEUTROPHILS # BLD AUTO: 3 K/UL — SIGNIFICANT CHANGE UP (ref 1.8–7.4)
NEUTROPHILS NFR BLD AUTO: 66.3 % — SIGNIFICANT CHANGE UP (ref 43–77)
NRBC BLD AUTO-RTO: 0 /100 WBCS — SIGNIFICANT CHANGE UP (ref 0–0)
PLATELET # BLD AUTO: 258 K/UL — SIGNIFICANT CHANGE UP (ref 150–400)
POTASSIUM SERPL-SCNC: 4.3 MMOL/L
PROT SERPL-MCNC: 6.9 G/DL
RBC # BLD: 4.05 M/UL — SIGNIFICANT CHANGE UP (ref 3.8–5.2)
RBC # FLD: 12.2 % — SIGNIFICANT CHANGE UP (ref 10.3–14.5)
SODIUM SERPL-SCNC: 141 MMOL/L
WBC # BLD: 4.53 K/UL — SIGNIFICANT CHANGE UP (ref 3.8–10.5)
WBC # FLD AUTO: 4.53 K/UL — SIGNIFICANT CHANGE UP (ref 3.8–10.5)

## 2025-03-14 PROCEDURE — 99214 OFFICE O/P EST MOD 30 MIN: CPT

## 2025-03-14 PROCEDURE — G2211 COMPLEX E/M VISIT ADD ON: CPT | Mod: NC

## 2025-03-19 RX ORDER — ABEMACICLIB 150 MG/1
150 TABLET ORAL
Qty: 30 | Refills: 5 | Status: DISCONTINUED | COMMUNITY
Start: 2025-03-14 | End: 2025-03-19

## 2025-04-18 ENCOUNTER — APPOINTMENT (OUTPATIENT)
Dept: HEMATOLOGY ONCOLOGY | Facility: CLINIC | Age: 55
End: 2025-04-18
Payer: COMMERCIAL

## 2025-04-18 ENCOUNTER — RESULT REVIEW (OUTPATIENT)
Age: 55
End: 2025-04-18

## 2025-04-18 VITALS
OXYGEN SATURATION: 97 % | HEART RATE: 83 BPM | WEIGHT: 190.04 LBS | RESPIRATION RATE: 16 BRPM | TEMPERATURE: 97.2 F | DIASTOLIC BLOOD PRESSURE: 86 MMHG | SYSTOLIC BLOOD PRESSURE: 129 MMHG | BODY MASS INDEX: 31.62 KG/M2

## 2025-04-18 DIAGNOSIS — Z79.899 OTHER LONG TERM (CURRENT) DRUG THERAPY: ICD-10-CM

## 2025-04-18 DIAGNOSIS — Z17.0 MALIGNANT NEOPLASM OF UNSPECIFIED SITE OF RIGHT FEMALE BREAST: ICD-10-CM

## 2025-04-18 DIAGNOSIS — C50.911 MALIGNANT NEOPLASM OF UNSPECIFIED SITE OF RIGHT FEMALE BREAST: ICD-10-CM

## 2025-04-18 LAB
BASOPHILS # BLD AUTO: 0.1 K/UL — SIGNIFICANT CHANGE UP (ref 0–0.2)
BASOPHILS NFR BLD AUTO: 3 % — HIGH (ref 0–2)
EOSINOPHIL # BLD AUTO: 0.1 K/UL — SIGNIFICANT CHANGE UP (ref 0–0.5)
EOSINOPHIL NFR BLD AUTO: 3 % — SIGNIFICANT CHANGE UP (ref 0–6)
HCT VFR BLD CALC: 36.6 % — SIGNIFICANT CHANGE UP (ref 34.5–45)
HGB BLD-MCNC: 12.9 G/DL — SIGNIFICANT CHANGE UP (ref 11.5–15.5)
IMM GRANULOCYTES NFR BLD AUTO: 0.3 % — SIGNIFICANT CHANGE UP (ref 0–0.9)
LYMPHOCYTES # BLD AUTO: 0.92 K/UL — LOW (ref 1–3.3)
LYMPHOCYTES # BLD AUTO: 27.9 % — SIGNIFICANT CHANGE UP (ref 13–44)
MCHC RBC-ENTMCNC: 32.7 PG — SIGNIFICANT CHANGE UP (ref 27–34)
MCHC RBC-ENTMCNC: 35.2 G/DL — SIGNIFICANT CHANGE UP (ref 32–36)
MCV RBC AUTO: 92.7 FL — SIGNIFICANT CHANGE UP (ref 80–100)
MONOCYTES # BLD AUTO: 0.22 K/UL — SIGNIFICANT CHANGE UP (ref 0–0.9)
MONOCYTES NFR BLD AUTO: 6.7 % — SIGNIFICANT CHANGE UP (ref 2–14)
NEUTROPHILS # BLD AUTO: 1.95 K/UL — SIGNIFICANT CHANGE UP (ref 1.8–7.4)
NEUTROPHILS NFR BLD AUTO: 59.1 % — SIGNIFICANT CHANGE UP (ref 43–77)
NRBC BLD AUTO-RTO: 0 /100 WBCS — SIGNIFICANT CHANGE UP (ref 0–0)
PLATELET # BLD AUTO: 232 K/UL — SIGNIFICANT CHANGE UP (ref 150–400)
RBC # BLD: 3.95 M/UL — SIGNIFICANT CHANGE UP (ref 3.8–5.2)
RBC # FLD: 12.1 % — SIGNIFICANT CHANGE UP (ref 10.3–14.5)
WBC # BLD: 3.3 K/UL — LOW (ref 3.8–10.5)
WBC # FLD AUTO: 3.3 K/UL — LOW (ref 3.8–10.5)

## 2025-04-18 PROCEDURE — 99214 OFFICE O/P EST MOD 30 MIN: CPT

## 2025-05-02 ENCOUNTER — APPOINTMENT (OUTPATIENT)
Dept: CARDIOLOGY | Facility: CLINIC | Age: 55
End: 2025-05-02

## 2025-05-14 ENCOUNTER — OUTPATIENT (OUTPATIENT)
Dept: OUTPATIENT SERVICES | Facility: HOSPITAL | Age: 55
LOS: 1 days | Discharge: HOME | End: 2025-05-14

## 2025-05-14 DIAGNOSIS — Z98.890 OTHER SPECIFIED POSTPROCEDURAL STATES: Chronic | ICD-10-CM

## 2025-05-14 DIAGNOSIS — C50.919 MALIGNANT NEOPLASM OF UNSPECIFIED SITE OF UNSPECIFIED FEMALE BREAST: ICD-10-CM

## 2025-05-15 ENCOUNTER — RESULT REVIEW (OUTPATIENT)
Age: 55
End: 2025-05-15

## 2025-05-15 ENCOUNTER — APPOINTMENT (OUTPATIENT)
Dept: HEMATOLOGY ONCOLOGY | Facility: CLINIC | Age: 55
End: 2025-05-15
Payer: COMMERCIAL

## 2025-05-15 VITALS
OXYGEN SATURATION: 99 % | HEART RATE: 71 BPM | DIASTOLIC BLOOD PRESSURE: 79 MMHG | BODY MASS INDEX: 31.66 KG/M2 | SYSTOLIC BLOOD PRESSURE: 117 MMHG | WEIGHT: 190.26 LBS | RESPIRATION RATE: 16 BRPM | TEMPERATURE: 97.5 F

## 2025-05-15 DIAGNOSIS — Z79.899 OTHER LONG TERM (CURRENT) DRUG THERAPY: ICD-10-CM

## 2025-05-15 DIAGNOSIS — Z17.0 MALIGNANT NEOPLASM OF OVERLAPPING SITES OF RIGHT FEMALE BREAST: ICD-10-CM

## 2025-05-15 DIAGNOSIS — C50.811 MALIGNANT NEOPLASM OF OVERLAPPING SITES OF RIGHT FEMALE BREAST: ICD-10-CM

## 2025-05-15 LAB
ALBUMIN SERPL ELPH-MCNC: 4.4 G/DL
ALP BLD-CCNC: 63 U/L
ALT SERPL-CCNC: 22 U/L
ANION GAP SERPL CALC-SCNC: 11 MMOL/L
AST SERPL-CCNC: 16 U/L
BASOPHILS # BLD AUTO: 0.1 K/UL — SIGNIFICANT CHANGE UP (ref 0–0.2)
BASOPHILS NFR BLD AUTO: 2.7 % — HIGH (ref 0–2)
BILIRUB SERPL-MCNC: 0.4 MG/DL
BUN SERPL-MCNC: 23 MG/DL
CALCIUM SERPL-MCNC: 10 MG/DL
CHLORIDE SERPL-SCNC: 104 MMOL/L
CO2 SERPL-SCNC: 25 MMOL/L
CREAT SERPL-MCNC: 0.89 MG/DL
EGFRCR SERPLBLD CKD-EPI 2021: 77 ML/MIN/1.73M2
EOSINOPHIL # BLD AUTO: 0.12 K/UL — SIGNIFICANT CHANGE UP (ref 0–0.5)
EOSINOPHIL NFR BLD AUTO: 3.3 % — SIGNIFICANT CHANGE UP (ref 0–6)
GLUCOSE SERPL-MCNC: 100 MG/DL
HCT VFR BLD CALC: 35 % — SIGNIFICANT CHANGE UP (ref 34.5–45)
HGB BLD-MCNC: 12.4 G/DL — SIGNIFICANT CHANGE UP (ref 11.5–15.5)
IMM GRANULOCYTES NFR BLD AUTO: 0.3 % — SIGNIFICANT CHANGE UP (ref 0–0.9)
LYMPHOCYTES # BLD AUTO: 0.92 K/UL — LOW (ref 1–3.3)
LYMPHOCYTES # BLD AUTO: 25 % — SIGNIFICANT CHANGE UP (ref 13–44)
MCHC RBC-ENTMCNC: 32.5 PG — SIGNIFICANT CHANGE UP (ref 27–34)
MCHC RBC-ENTMCNC: 35.4 G/DL — SIGNIFICANT CHANGE UP (ref 32–36)
MCV RBC AUTO: 91.9 FL — SIGNIFICANT CHANGE UP (ref 80–100)
MONOCYTES # BLD AUTO: 0.27 K/UL — SIGNIFICANT CHANGE UP (ref 0–0.9)
MONOCYTES NFR BLD AUTO: 7.3 % — SIGNIFICANT CHANGE UP (ref 2–14)
NEUTROPHILS # BLD AUTO: 2.26 K/UL — SIGNIFICANT CHANGE UP (ref 1.8–7.4)
NEUTROPHILS NFR BLD AUTO: 61.4 % — SIGNIFICANT CHANGE UP (ref 43–77)
NRBC BLD AUTO-RTO: 0 /100 WBCS — SIGNIFICANT CHANGE UP (ref 0–0)
PLATELET # BLD AUTO: 249 K/UL — SIGNIFICANT CHANGE UP (ref 150–400)
POTASSIUM SERPL-SCNC: 4.4 MMOL/L
PROT SERPL-MCNC: 6.9 G/DL
RBC # BLD: 3.81 M/UL — SIGNIFICANT CHANGE UP (ref 3.8–5.2)
RBC # FLD: 12.4 % — SIGNIFICANT CHANGE UP (ref 10.3–14.5)
SODIUM SERPL-SCNC: 140 MMOL/L
WBC # BLD: 3.68 K/UL — LOW (ref 3.8–10.5)
WBC # FLD AUTO: 3.68 K/UL — LOW (ref 3.8–10.5)

## 2025-05-15 PROCEDURE — G2211 COMPLEX E/M VISIT ADD ON: CPT | Mod: NC

## 2025-05-15 PROCEDURE — 99214 OFFICE O/P EST MOD 30 MIN: CPT

## 2025-06-13 ENCOUNTER — APPOINTMENT (OUTPATIENT)
Dept: HEMATOLOGY ONCOLOGY | Facility: CLINIC | Age: 55
End: 2025-06-13
Payer: COMMERCIAL

## 2025-06-13 ENCOUNTER — RESULT REVIEW (OUTPATIENT)
Age: 55
End: 2025-06-13

## 2025-06-13 LAB
ALBUMIN SERPL ELPH-MCNC: 4.5 G/DL
ALP BLD-CCNC: 65 U/L
ALT SERPL-CCNC: 21 U/L
ANION GAP SERPL CALC-SCNC: 9 MMOL/L
AST SERPL-CCNC: 17 U/L
BASOPHILS # BLD AUTO: 0.11 K/UL — SIGNIFICANT CHANGE UP (ref 0–0.2)
BASOPHILS NFR BLD AUTO: 3 % — HIGH (ref 0–2)
BILIRUB SERPL-MCNC: 0.3 MG/DL
BUN SERPL-MCNC: 22 MG/DL
CALCIUM SERPL-MCNC: 9.9 MG/DL
CHLORIDE SERPL-SCNC: 106 MMOL/L
CO2 SERPL-SCNC: 26 MMOL/L
CREAT SERPL-MCNC: 0.83 MG/DL
EGFRCR SERPLBLD CKD-EPI 2021: 84 ML/MIN/1.73M2
EOSINOPHIL # BLD AUTO: 0.08 K/UL — SIGNIFICANT CHANGE UP (ref 0–0.5)
EOSINOPHIL NFR BLD AUTO: 2.2 % — SIGNIFICANT CHANGE UP (ref 0–6)
GLUCOSE SERPL-MCNC: 99 MG/DL
HCT VFR BLD CALC: 36.8 % — SIGNIFICANT CHANGE UP (ref 34.5–45)
HGB BLD-MCNC: 12.9 G/DL — SIGNIFICANT CHANGE UP (ref 11.5–15.5)
IMM GRANULOCYTES NFR BLD AUTO: 0.3 % — SIGNIFICANT CHANGE UP (ref 0–0.9)
LYMPHOCYTES # BLD AUTO: 0.93 K/UL — LOW (ref 1–3.3)
LYMPHOCYTES # BLD AUTO: 25.1 % — SIGNIFICANT CHANGE UP (ref 13–44)
MCHC RBC-ENTMCNC: 32.7 PG — SIGNIFICANT CHANGE UP (ref 27–34)
MCHC RBC-ENTMCNC: 35.1 G/DL — SIGNIFICANT CHANGE UP (ref 32–36)
MCV RBC AUTO: 93.4 FL — SIGNIFICANT CHANGE UP (ref 80–100)
MONOCYTES # BLD AUTO: 0.32 K/UL — SIGNIFICANT CHANGE UP (ref 0–0.9)
MONOCYTES NFR BLD AUTO: 8.6 % — SIGNIFICANT CHANGE UP (ref 2–14)
NEUTROPHILS # BLD AUTO: 2.26 K/UL — SIGNIFICANT CHANGE UP (ref 1.8–7.4)
NEUTROPHILS NFR BLD AUTO: 60.8 % — SIGNIFICANT CHANGE UP (ref 43–77)
NRBC BLD AUTO-RTO: 0 /100 WBCS — SIGNIFICANT CHANGE UP (ref 0–0)
PLATELET # BLD AUTO: 258 K/UL — SIGNIFICANT CHANGE UP (ref 150–400)
POTASSIUM SERPL-SCNC: 4.5 MMOL/L
PROT SERPL-MCNC: 7.1 G/DL
RBC # BLD: 3.94 M/UL — SIGNIFICANT CHANGE UP (ref 3.8–5.2)
RBC # FLD: 12.7 % — SIGNIFICANT CHANGE UP (ref 10.3–14.5)
SODIUM SERPL-SCNC: 140 MMOL/L
WBC # BLD: 3.71 K/UL — LOW (ref 3.8–10.5)
WBC # FLD AUTO: 3.71 K/UL — LOW (ref 3.8–10.5)

## 2025-06-13 PROCEDURE — 99214 OFFICE O/P EST MOD 30 MIN: CPT

## 2025-06-18 NOTE — PRE-ANESTHESIA EVALUATION ADULT - NSANTHAGERD_ENT_A_CORE
Monitor: The patient's diabetes is improving with treatment and lifestyle modifications.  Evaluation: Diagnostic tests ordered, see full progress note. Check BMP, AIC.  Assessment/Treatment:  Continue current treatment/monitoring regimen.  Dietary recommendations for ADA diet given to patient.  Regular aerobic exercise recommended.  Condition will be reassessed in 6 months.    Yes

## 2025-07-07 RX ORDER — ANASTROZOLE TABLETS 1 MG/1
1 TABLET ORAL
Qty: 4 | Refills: 0 | Status: DISCONTINUED | COMMUNITY
Start: 2025-07-07 | End: 2025-07-07

## 2025-07-14 ENCOUNTER — RX RENEWAL (OUTPATIENT)
Age: 55
End: 2025-07-14

## 2025-08-04 ENCOUNTER — RX RENEWAL (OUTPATIENT)
Age: 55
End: 2025-08-04

## 2025-08-14 ENCOUNTER — APPOINTMENT (OUTPATIENT)
Dept: HEMATOLOGY ONCOLOGY | Facility: CLINIC | Age: 55
End: 2025-08-14
Payer: COMMERCIAL

## 2025-08-14 ENCOUNTER — RESULT REVIEW (OUTPATIENT)
Age: 55
End: 2025-08-14

## 2025-08-14 VITALS
WEIGHT: 194 LBS | OXYGEN SATURATION: 97 % | HEIGHT: 65 IN | RESPIRATION RATE: 16 BRPM | BODY MASS INDEX: 32.32 KG/M2 | DIASTOLIC BLOOD PRESSURE: 80 MMHG | SYSTOLIC BLOOD PRESSURE: 114 MMHG | TEMPERATURE: 97.5 F | HEART RATE: 81 BPM

## 2025-08-14 DIAGNOSIS — Z17.0 MALIGNANT NEOPLASM OF OVERLAPPING SITES OF RIGHT FEMALE BREAST: ICD-10-CM

## 2025-08-14 DIAGNOSIS — Z79.811 LONG TERM (CURRENT) USE OF AROMATASE INHIBITORS: ICD-10-CM

## 2025-08-14 DIAGNOSIS — C50.811 MALIGNANT NEOPLASM OF OVERLAPPING SITES OF RIGHT FEMALE BREAST: ICD-10-CM

## 2025-08-14 LAB
ALBUMIN SERPL ELPH-MCNC: 4.6 G/DL
ALP BLD-CCNC: 66 U/L
ALT SERPL-CCNC: 25 U/L
ANION GAP SERPL CALC-SCNC: 18 MMOL/L
AST SERPL-CCNC: 22 U/L
BILIRUB SERPL-MCNC: 0.3 MG/DL
BUN SERPL-MCNC: 18 MG/DL
CALCIUM SERPL-MCNC: 10.2 MG/DL
CHLORIDE SERPL-SCNC: 105 MMOL/L
CO2 SERPL-SCNC: 20 MMOL/L
CREAT SERPL-MCNC: 0.82 MG/DL
EGFRCR SERPLBLD CKD-EPI 2021: 85 ML/MIN/1.73M2
GLUCOSE SERPL-MCNC: 103 MG/DL
POTASSIUM SERPL-SCNC: 4.2 MMOL/L
PROT SERPL-MCNC: 7 G/DL
SODIUM SERPL-SCNC: 142 MMOL/L

## 2025-08-14 PROCEDURE — 99214 OFFICE O/P EST MOD 30 MIN: CPT

## 2025-08-14 PROCEDURE — G2211 COMPLEX E/M VISIT ADD ON: CPT | Mod: NC

## 2025-08-22 LAB — ESTRADIOL ULTRASENSITIVE: <2.5 PG/ML

## 2025-09-05 RX ORDER — ABEMACICLIB 150 MG/1
150 TABLET ORAL DAILY
Qty: 30 | Refills: 5 | Status: ACTIVE | COMMUNITY
Start: 2025-09-05 | End: 1900-01-01